# Patient Record
Sex: FEMALE | Race: WHITE | Employment: FULL TIME | RURAL
[De-identification: names, ages, dates, MRNs, and addresses within clinical notes are randomized per-mention and may not be internally consistent; named-entity substitution may affect disease eponyms.]

---

## 2017-01-10 RX ORDER — LIRAGLUTIDE 6 MG/ML
INJECTION SUBCUTANEOUS
Qty: 27 ML | Refills: 1 | Status: SHIPPED | OUTPATIENT
Start: 2017-01-10 | End: 2017-07-09 | Stop reason: SDUPTHER

## 2017-01-25 ENCOUNTER — OFFICE VISIT (OUTPATIENT)
Dept: FAMILY MEDICINE CLINIC | Age: 62
End: 2017-01-25

## 2017-01-25 VITALS
WEIGHT: 192.6 LBS | HEART RATE: 61 BPM | DIASTOLIC BLOOD PRESSURE: 58 MMHG | BODY MASS INDEX: 34.12 KG/M2 | SYSTOLIC BLOOD PRESSURE: 112 MMHG | HEIGHT: 63 IN | RESPIRATION RATE: 20 BRPM | OXYGEN SATURATION: 98 % | TEMPERATURE: 97.8 F

## 2017-01-25 DIAGNOSIS — E78.00 HYPERCHOLESTEROLEMIA: ICD-10-CM

## 2017-01-25 DIAGNOSIS — M19.90 ARTHRITIS: ICD-10-CM

## 2017-01-25 DIAGNOSIS — E03.4 HYPOTHYROIDISM DUE TO ACQUIRED ATROPHY OF THYROID: ICD-10-CM

## 2017-01-25 DIAGNOSIS — E11.9 TYPE 2 DIABETES MELLITUS WITHOUT COMPLICATION, WITHOUT LONG-TERM CURRENT USE OF INSULIN (HCC): ICD-10-CM

## 2017-01-25 DIAGNOSIS — Z00.00 ROUTINE ADULT HEALTH MAINTENANCE: Primary | ICD-10-CM

## 2017-01-25 DIAGNOSIS — E55.9 VITAMIN D DEFICIENCY: ICD-10-CM

## 2017-01-25 DIAGNOSIS — Z23 ENCOUNTER FOR IMMUNIZATION: ICD-10-CM

## 2017-01-25 RX ORDER — DICLOFENAC SODIUM 75 MG/1
75 TABLET, DELAYED RELEASE ORAL
Qty: 180 TAB | Refills: 0 | Status: SHIPPED | OUTPATIENT
Start: 2017-01-25 | End: 2017-05-28 | Stop reason: SDUPTHER

## 2017-01-25 NOTE — PROGRESS NOTES
Identified pt with two pt identifiers(name and ). Chief Complaint   Patient presents with    Diabetes    Hypertension    Coagulation disorder    Leg Pain     slipped this morning - pulled rt thigh and did yoga last night         Health Maintenance Due   Topic    BREAST CANCER SCRN MAMMOGRAM     HEMOGLOBIN A1C Q6M    We just got the mammogram in. On your desk     Wt Readings from Last 3 Encounters:   17 192 lb 9.6 oz (87.4 kg)   10/31/16 190 lb 3.2 oz (86.3 kg)   16 189 lb (85.7 kg)     Temp Readings from Last 3 Encounters:   17 97.8 °F (36.6 °C) (Oral)   10/31/16 97.7 °F (36.5 °C) (Oral)   16 97.7 °F (36.5 °C) (Oral)     BP Readings from Last 3 Encounters:   17 112/58   10/31/16 118/78   16 115/53     Pulse Readings from Last 3 Encounters:   17 61   10/31/16 61   16 61         Learning Assessment:  :     Learning Assessment 1/15/2014   PRIMARY LEARNER Patient   HIGHEST LEVEL OF EDUCATION - PRIMARY LEARNER  > 4 YEARS OF COLLEGE   BARRIERS PRIMARY LEARNER NONE   CO-LEARNER CAREGIVER No   PRIMARY LANGUAGE ENGLISH   LEARNER PREFERENCE PRIMARY DEMONSTRATION   ANSWERED BY self   RELATIONSHIP SELF       Depression Screening:  :     PHQ 2 / 9, over the last two weeks 2016   Little interest or pleasure in doing things Not at all   Feeling down, depressed or hopeless Not at all   Total Score PHQ 2 0       Fall Risk Assessment:  :     Fall Risk Assessment, last 12 mths 2016   Able to walk? Yes   Fall in past 12 months? No       Abuse Screening:  :     Abuse Screening Questionnaire 2016   Do you ever feel afraid of your partner? N N   Are you in a relationship with someone who physically or mentally threatens you? N N   Is it safe for you to go home?  Y Y       Coordination of Care Questionnaire:  :     1) Have you been to an emergency room, urgent care clinic since your last visit? no   Hospitalized since your last visit? no             2) Have you seen or consulted any other health care providers outside of [address] [place] [address] since your last visit? yes  mammogram (Include any pap smears or colon screenings in this section.)    3) Do you have an Advance Directive on file? yes  Are you interested in receiving information about Advance Directives? no    Reviewed record in preparation for visit and have obtained necessary documentation. Medication reconciliation up to date and corrected with patient at this time.

## 2017-01-25 NOTE — PROGRESS NOTES
Subjective: Mnoica Ro is a 62 y.o. female seen for follow up of diabetes. She also has hyperlipidemia. Diabetic Review of Systems - medication compliance: compliant most of the time, diabetic diet compliance: compliant most of the time, home glucose monitoring: is performed regularly. Other symptoms and concerns: none. Patient Active Problem List    Diagnosis Date Noted    Diabetes ([facility] [address] 12/10/2010    Hypercholesterolemia 12/10/2010    Hypothyroidism 12/10/2010    HX: breast cancer 12/10/2010    Environmental allergies 12/10/2010    Arthritis 12/10/2010    Sleep apnea 12/10/2010    Fatty liver 12/10/2010     Current Outpatient Prescriptions   Medication Sig Dispense Refill    diclofenac EC (VOLTAREN) 75 mg EC tablet Take 1 Tab by mouth two (2) times daily as needed. Indications: OSTEOARTHRITIS 180 Tab 0    VICTOZA 3-ELVIS 0.6 mg/0.1 mL (18 mg/3 mL) sub-q pen INJECT 1.8 MG UNDER THE SKIN ONCE A DAY 27 mL 1    GLUC TRINIDAD/CHONDRO TRINIDAD A/VIT C/MN (GLUCOSAMINE 1500 COMPLEX PO) Take  by mouth daily.  levothyroxine (SYNTHROID) 100 mcg tablet TAKE 1 TABLET DAILY BEFORE BREAKFAST (CHANGE IN DOSE OF MEDICATION) 90 Tab 3    lisinopril (PRINIVIL, ZESTRIL) 10 mg tablet Take 0.5 Tabs by mouth daily. TAKE 1 TABLET BY MOUTH EVERY DAY 90 Tab 3    colesevelam (WELCHOL) 625 mg tablet TAKE 3 TABLETS BY MOUTH TWICE A DAY WITH MEALS 540 Tab 3    metFORMIN ER (GLUCOPHAGE XR) 500 mg tablet TAKE 2 TABLETS BY MOUTH IN THE MORNING AND TAKE 2 TABLETS IN THE EVENING 360 Tab 3    venlafaxine-SR (EFFEXOR-XR) 75 mg capsule TAKE ONE CAPSULE BY MOUTH EVERY DAY 90 Cap 3    mometasone (NASONEX) 50 mcg/actuation nasal spray 2 Sprays daily.  ferrous sulfate 325 mg (65 mg iron) tablet Take  by mouth Daily (before breakfast).  fluticasone (FLONASE) 50 mcg/actuation nasal spray USE 2 SPRAYS TO BOTH NOSTRIL ONCE DAILY 16 g 5    milk thistle 500 mg cap Take 500 mg by mouth daily.  60 Cap 0    glucose blood VI test strips (ONE TOUCH ULTRA TEST) strip Use as directed 1 Package 11    cholecalciferol, vitamin d3, (VITAMIN D) 1,000 unit tablet Take 2,000 Units by mouth daily.  aspirin delayed-release 81 mg tablet Take  by mouth daily.  Omeprazole delayed release (PRILOSEC D/R) 20 mg tablet Take 1 Tab by mouth daily. 30 Tab 5    cetirizine (ZYRTEC) 10 mg tablet Take 10 mg by mouth daily.  MULTIVITAMIN PO Take  by mouth.  cyanocobalamin (VITAMIN B-12) 2,500 mcg Subl Take 2,500 mg by mouth daily.  CALCIUM CARBONATE/VITAMIN D3 (CALCIUM 600 + D,3, PO) Take  by mouth.  magnesium 250 mg Tab Take  by mouth.  OMEGA-3 FATTY ACIDS (OMEGA 3 PO) Take 4 Tabs by mouth daily.  carisoprodol (SOMA) 250 mg tablet TAKE 1 TABLET BY MOUTH 4 TIMES A DAY AS NEEDED FOR MUSCLE SPASM 90 Tab 1    traMADol (ULTRAM) 50 mg tablet Take 1 Tab by mouth every eight (8) hours as needed for Pain. Max Daily Amount: 150 mg. 180 Tab 1    clotrimazole-betamethasone (LOTRISONE) topical cream Apply to rash twice a day.  45 g 3    VENTOLIN HFA 90 mcg/actuation inhaler INHALE 2 PUFFS BY MOUTH EVERY 4 HOURS AS NEEDED FOR WHEEZING OR SHORTNESS OF BREATH 18 Inhaler 1     Allergies   Allergen Reactions    Crestor [Rosuvastatin] Other (comments)     Increased CK    Simvastatin Myalgia     Past Medical History   Diagnosis Date    Arthritis 12/10/2010    Breast cancer ([facility] [address]      removed 2/2/09    Diabetes ([facility] [address]     Fatty liver 12/10/2010    Hypercholesterolemia     Sleep apnea 12/10/2010    Thyroid disease      Past Surgical History   Procedure Laterality Date    Hx breast lumpectomy  2/2/09     reincision    Hx tonsil and adenoidectomy  1960    Hx gyn  2010     cervical polypectomy    Hx lithotripsy  1994    Endoscopy, colon, diagnostic  1/2009    Hx orthopaedic       foot    Hx heent      Hx cataract removal Right 11/2016    Hx cataract removal Left 11/2016     Family History   Problem Relation Age of Onset    Cancer Mother     COPD Mother     Cancer Father      mesothelioma    Arthritis-rheumatoid Sister      Social History   Substance Use Topics    Smoking status: Never Smoker    Smokeless tobacco: Never Used    Alcohol use No             Review of Systems  Pertinent items are noted in HPI. Objective:     Visit Vitals    /58 (BP 1 Location: Left arm, BP Patient Position: Sitting)    Pulse 61    Temp 97.8 °F (36.6 °C) (Oral)    Resp 20    Ht 5' 3\" (1.6 m)    Wt 192 lb 9.6 oz (87.4 kg)    SpO2 98%    BMI 34.12 kg/m2     Appearance: alert, well appearing, and in no distress and overweight. Exam: heart sounds normal rate, regular rhythm, normal S1, S2, no murmurs, rubs, clicks or gallops, chest clear  Lab review: orders written for new lab studies as appropriate; see orders. Assessment/Plan:     diabetes , hypertension well controlled, hyperlipidemia . Diabetic issues reviewed with her: annual eye examinations at Ophthalmology discussed. ICD-10-CM ICD-9-CM    1. Routine adult health maintenance Z00.00 V70.0 CBC W/O DIFF      METABOLIC PANEL, COMPREHENSIVE      LIPID PANEL      TSH 3RD GENERATION      HEMOGLOBIN A1C WITH EAG   2. Type 2 diabetes mellitus without complication, without long-term current use of insulin (HCC) E11.9 250.00    3. Hypercholesterolemia E78.00 272.0    4. Hypothyroidism due to acquired atrophy of thyroid E03.4 244.8      246.8    5. Vitamin D deficiency E55.9 268.9 VITAMIN D, 25 HYDROXY   6. Arthritis M19.90 716.90 diclofenac EC (VOLTAREN) 75 mg EC tablet   7. Encounter for immunization Z23 V03.89 pneumococcal 13 wilda conj dip (PREVNAR-13) 0.5 mL syrg injection     Patient Instructions   Pneumococcal 13-Valent Vaccine, Diphtheria Conjugate (By injection)   Pneumococcal 13-Valent Vaccine, Diphtheria Conjugate (HTB-wck-ZZX-al 13-VAY-lent VAX-een, dif-THEER-ee-a XJY-nxi-wgcz)  Prevents infections, such as pneumonia and meningitis.    Brand Name(s):Prevnar 13   There may be other brand names for this medicine. When This Medicine Should Not Be Used: This vaccine is not right for everyone. You should not receive this vaccine if you had an allergic reaction to pneumococcal or diphtheria vaccine. How to Use This Medicine:   Injectable  · A nurse or other health provider will give you this medicine. This vaccine is usually given as a shot into a muscle in the thigh or upper arm. · The vaccine schedule is different for different people. ¨ Tell your doctor if your child was born prematurely. Children who were premature may need to follow a different schedule. ¨ Children under 6: This vaccine is usually given as 3 or 4 separate shots over several months. Your child's doctor will tell you how many shots are needed and when to come back for the next one. ¨ Children over 6: This vaccine is given as a single shot. If your child recently received another pneumonia vaccine, this one should be given at least 8 weeks later. ¨ Adults over 50: This vaccine is given as a single dose. · It is very important for your child to receive all of the shots for the vaccine. · Missed dose: This vaccine must be given on a fixed schedule. If your child misses a dose, call your child's doctor for another appointment. Drugs and Foods to Avoid:   Ask your doctor or pharmacist before using any other medicine, including over-the-counter medicines, vitamins, and herbal products. · Some medicines can affect how this vaccine works. Tell your doctor if you are receiving a treatment or medicine that causes a weak immune system. This includes radiation treatment, steroid medicine (such as hydrocortisone, methylprednisolone, prednisolone, prednisone), or cancer medicine. Warnings While Using This Medicine:   · Adults and adolescents: Tell your doctor if you are pregnant or breastfeeding. · Tell your doctor if you have a weak immune system. You may not be fully protected by this vaccine.   Possible Side Effects While Using This Medicine:   Call your doctor right away if you notice any of these side effects:  · Allergic reaction: Itching or hives, swelling in your face or hands, swelling or tingling in your mouth or throat, chest tightness, trouble breathing  · High fever  If you notice these less serious side effects, talk with your doctor:   · Crying, irritability, or fussiness  · Joint or muscle pain  · Mild skin rash  · Pain, burning, redness, or swelling where the shot was given  · Poor appetite  · Sleep changes  If you notice other side effects that you think are caused by this medicine, tell your doctor. Call your doctor for medical advice about side effects. You may report side effects to FDA at 7-463-FDA-1497  © 2016 [address] is for End User's use only and may not be sold, redistributed or otherwise used for commercial purposes. The above information is an  only. It is not intended as medical advice for individual conditions or treatments. Talk to your doctor, nurse or pharmacist before following any medical regimen to see if it is safe and effective for you.

## 2017-01-25 NOTE — MR AVS SNAPSHOT
Visit Information Date & Time Provider Department Dept. Phone Encounter #  
 [id number]  1:07 AM Brooke MarinAlen [phone] [id number] Upcoming Health Maintenance Date Due MICROALBUMIN Q1 4/7/2017 FOOT EXAM Q1 7/20/2017 LIPID PANEL Q1 7/20/2017 HEMOGLOBIN A1C Q6M 7/25/2017 EYE EXAM RETINAL OR DILATED Q1 9/21/2017 DTaP/Tdap/Td series (2 - Td) 11/12/2018 BREAST CANCER SCRN MAMMOGRAM 1/11/2019 PAP AKA CERVICAL CYTOLOGY 3/24/2019 COLONOSCOPY 10/23/2025 Allergies as of 1/25/2017  Review Complete On: 2/69/7606 By: Brooke Marin MD  
  
 Severity Noted Reaction Type Reactions Crestor [Rosuvastatin]  12/12/2011   Side Effect Other (comments) Increased CK Simvastatin  12/12/2011    Myalgia Current Immunizations  Reviewed on 10/31/2016 Name Date Influenza Vaccine 10/25/2016, 10/21/2014, 10/8/2012 Influenza Vaccine Split 10/10/2011 Pneumococcal Vaccine (Unspecified Type) 10/10/2011 TDAP Vaccine 11/12/2008 Zoster Vaccine, Live 4/22/2015 Not reviewed this visit You Were Diagnosed With   
  
 Codes Comments Routine adult health maintenance    -  Primary ICD-10-CM: Z00.00 ICD-9-CM: V70.0 Type 2 diabetes mellitus without complication, without long-term current use of insulin (HCC)     ICD-10-CM: E11.9 ICD-9-CM: 250.00 Hypercholesterolemia     ICD-10-CM: E78.00 ICD-9-CM: 272.0 Hypothyroidism due to acquired atrophy of thyroid     ICD-10-CM: E03.4 ICD-9-CM: 244.8, 246.8 Vitamin D deficiency     ICD-10-CM: E55.9 ICD-9-CM: 268.9 Arthritis     ICD-10-CM: M19.90 ICD-9-CM: 716.90 Encounter for immunization     ICD-10-CM: K84 ICD-9-CM: V03.89 Vitals BP Pulse Temp Resp Height(growth percentile) Weight(growth percentile) 112/58 (BP 1 Location: Left arm, BP Patient Position: Sitting) 61 97.8 °F (36.6 °C) (Oral) 20 5' 3\" (1.6 m) 192 lb 9.6 oz (87.4 kg) SpO2 BMI OB Status Smoking Status 98% 34.12 kg/m2 Postmenopausal Never Smoker BMI and BSA Data Body Mass Index Body Surface Area  
 34.12 kg/m 2 1.97 m 2 Preferred Pharmacy Pharmacy Name Phone [facility] [phone] Your Updated Medication List  
  
   
This list is accurate as of: 1/25/17 10:38 AM.  Always use your most recent med list.  
  
  
  
  
 aspirin delayed-release 81 mg tablet Take  by mouth daily. CALCIUM 600 + D(3) PO Take  by mouth.  
  
 carisoprodol 250 mg tablet Commonly known as:  SOMA TAKE 1 TABLET BY MOUTH 4 TIMES A DAY AS NEEDED FOR MUSCLE SPASM  
  
 clotrimazole-betamethasone topical cream  
Commonly known as:  Kaleta Euler Apply to rash twice a day. colesevelam 625 mg tablet Commonly known as:  WELCHOL  
TAKE 3 TABLETS BY MOUTH TWICE A DAY WITH MEALS  
  
 diclofenac EC 75 mg EC tablet Commonly known as:  VOLTAREN Take 1 Tab by mouth two (2) times daily as needed. Indications: OSTEOARTHRITIS  
  
 ferrous sulfate 325 mg (65 mg iron) tablet Take  by mouth Daily (before breakfast). fluticasone 50 mcg/actuation nasal spray Commonly known as:  FLONASE  
USE 2 SPRAYS TO BOTH NOSTRIL ONCE DAILY  
  
 GLUCOSAMINE 1500 COMPLEX PO Take  by mouth daily. glucose blood VI test strips strip Commonly known as:  ONETOUCH ULTRA TEST Use as directed  
  
 levothyroxine 100 mcg tablet Commonly known as:  SYNTHROID  
TAKE 1 TABLET DAILY BEFORE BREAKFAST (CHANGE IN DOSE OF MEDICATION)  
  
 lisinopril 10 mg tablet Commonly known as:  Jina Christina Take 0.5 Tabs by mouth daily. TAKE 1 TABLET BY MOUTH EVERY DAY  
  
 magnesium 250 mg Tab Take  by mouth.  
  
 metFORMIN  mg tablet Commonly known as:  GLUCOPHAGE XR  
TAKE 2 TABLETS BY MOUTH IN THE MORNING AND TAKE 2 TABLETS IN THE EVENING  
  
 milk thistle 500 mg Cap Take 500 mg by mouth daily. MULTIVITAMIN PO Take  by mouth. NASONEX 50 mcg/actuation nasal spray Generic drug:  mometasone 2 Sprays daily. OMEGA 3 PO Take 4 Tabs by mouth daily. Omeprazole delayed release 20 mg tablet Commonly known as:  PRILOSEC D/R Take 1 Tab by mouth daily. pneumococcal 13 wilda conj dip 0.5 mL Syrg injection Commonly known as:  PREVNAR-13  
0.5 mL by IntraMUSCular route once for 1 dose. traMADol 50 mg tablet Commonly known as:  ULTRAM  
Take 1 Tab by mouth every eight (8) hours as needed for Pain. Max Daily Amount: 150 mg.  
  
 venlafaxine-SR 75 mg capsule Commonly known as:  EFFEXOR-XR  
TAKE ONE CAPSULE BY MOUTH EVERY DAY  
  
 VENTOLIN HFA 90 mcg/actuation inhaler Generic drug:  albuterol INHALE 2 PUFFS BY MOUTH EVERY 4 HOURS AS NEEDED FOR WHEEZING OR SHORTNESS OF BREATH  
  
 VICTOZA 3-ELVIS 0.6 mg/0.1 mL (18 mg/3 mL) sub-q pen Generic drug:  Liraglutide INJECT 1.8 MG UNDER THE SKIN ONCE A DAY  
  
 VITAMIN B-12 2,500 mcg sublingual tablet Generic drug:  cyanocobalamin Take 2,500 mg by mouth daily. VITAMIN D3 1,000 unit tablet Generic drug:  cholecalciferol Take 2,000 Units by mouth daily. ZyrTEC 10 mg tablet Generic drug:  cetirizine Take 10 mg by mouth daily. Prescriptions Printed Refills  
 pneumococcal 13 wilda conj dip (PREVNAR-13) 0.5 mL syrg injection 0 Si.5 mL by IntraMUSCular route once for 1 dose. Class: Print Route: IntraMUSCular Prescriptions Sent to Pharmacy Refills  
 diclofenac EC (VOLTAREN) 75 mg EC tablet 0 Sig: Take 1 Tab by mouth two (2) times daily as needed. Indications: OSTEOARTHRITIS Class: Normal  
 Pharmacy: [address], [address] [place] [address] Ph #: [phone] Route: Oral  
  
We Performed the Following CBC W/O DIFF [06708 CPT(R)] HEMOGLOBIN A1C WITH EAG [70496 CPT(R)] LIPID PANEL [39908 CPT(R)] METABOLIC PANEL, COMPREHENSIVE [26162 CPT(R)] TSH 3RD GENERATION [22330 CPT(R)] VITAMIN D, 25 HYDROXY [id number] CPT(R)] Patient Instructions Pneumococcal 13-Valent Vaccine, Diphtheria Conjugate (By injection) Pneumococcal 13-Valent Vaccine, Diphtheria Conjugate (NIH-mui-GMS-al 13-VAY-lent VAX-een, dif-THEER-ee-a URF-bej-oqck) Prevents infections, such as pneumonia and meningitis. Brand Name(s):Prevnar 13 There may be other brand names for this medicine. When This Medicine Should Not Be Used: This vaccine is not right for everyone. You should not receive this vaccine if you had an allergic reaction to pneumococcal or diphtheria vaccine. How to Use This Medicine:  
Injectable · A nurse or other health provider will give you this medicine. This vaccine is usually given as a shot into a muscle in the thigh or upper arm. · The vaccine schedule is different for different people. ¨ Tell your doctor if your child was born prematurely. Children who were premature may need to follow a different schedule. ¨ Children under 6: This vaccine is usually given as 3 or 4 separate shots over several months. Your child's doctor will tell you how many shots are needed and when to come back for the next one. ¨ Children over 6: This vaccine is given as a single shot. If your child recently received another pneumonia vaccine, this one should be given at least 8 weeks later. ¨ Adults over 50: This vaccine is given as a single dose. · It is very important for your child to receive all of the shots for the vaccine. · Missed dose: This vaccine must be given on a fixed schedule. If your child misses a dose, call your child's doctor for another appointment. Drugs and Foods to Avoid: Ask your doctor or pharmacist before using any other medicine, including over-the-counter medicines, vitamins, and herbal products. · Some medicines can affect how this vaccine works. Tell your doctor if you are receiving a treatment or medicine that causes a weak immune system. This includes radiation treatment, steroid medicine (such as hydrocortisone, methylprednisolone, prednisolone, prednisone), or cancer medicine. Warnings While Using This Medicine: · Adults and adolescents: Tell your doctor if you are pregnant or breastfeeding. · Tell your doctor if you have a weak immune system. You may not be fully protected by this vaccine. Possible Side Effects While Using This Medicine:  
Call your doctor right away if you notice any of these side effects: · Allergic reaction: Itching or hives, swelling in your face or hands, swelling or tingling in your mouth or throat, chest tightness, trouble breathing · High fever If you notice these less serious side effects, talk with your doctor: · Crying, irritability, or fussiness · Joint or muscle pain · Mild skin rash · Pain, burning, redness, or swelling where the shot was given · Poor appetite · Sleep changes If you notice other side effects that you think are caused by this medicine, tell your doctor. Call your doctor for medical advice about side effects. You may report side effects to FDA at 6-028-FDA-3128 © 2016 [address] is for End User's use only and may not be sold, redistributed or otherwise used for commercial purposes. The above information is an  only. It is not intended as medical advice for individual conditions or treatments. Talk to your doctor, nurse or pharmacist before following any medical regimen to see if it is safe and effective for you. Introducing [facility]! Dear Isauro Stevens: Thank you for requesting a Likeability account. Our records indicate that you already have an active Likeability account. You can access your account anytime at [email or web address] Did you know that you can access your hospital and ER discharge instructions at any time in Exerscrip? You can also review all of your test results from your hospital stay or ER visit. Additional Information If you have questions, please visit the Frequently Asked Questions section of the Exersc[employer] website at [email or web address] Remember, Exerscrip is NOT to be used for urgent needs. For medical emergencies, dial 911. Now available from your iPhone and Android! Please provide this summary of care documentation to your next provider. Your primary care clinician is listed as Chacho Cruz. If you have any questions after today's visit, please call [phone].

## 2017-01-25 NOTE — PATIENT INSTRUCTIONS
Pneumococcal 13-Valent Vaccine, Diphtheria Conjugate (By injection)   Pneumococcal 13-Valent Vaccine, Diphtheria Conjugate (HKB-mbp-NPO-al 13-VAY-lent VAX-een, dif-THEER-ee-a CCO-lgn-mfwm)  Prevents infections, such as pneumonia and meningitis. Brand Name(s):Prevnar 13   There may be other brand names for this medicine. When This Medicine Should Not Be Used: This vaccine is not right for everyone. You should not receive this vaccine if you had an allergic reaction to pneumococcal or diphtheria vaccine. How to Use This Medicine:   Injectable  · A nurse or other health provider will give you this medicine. This vaccine is usually given as a shot into a muscle in the thigh or upper arm. · The vaccine schedule is different for different people. ¨ Tell your doctor if your child was born prematurely. Children who were premature may need to follow a different schedule. ¨ Children under 6: This vaccine is usually given as 3 or 4 separate shots over several months. Your child's doctor will tell you how many shots are needed and when to come back for the next one. ¨ Children over 6: This vaccine is given as a single shot. If your child recently received another pneumonia vaccine, this one should be given at least 8 weeks later. ¨ Adults over 50: This vaccine is given as a single dose. · It is very important for your child to receive all of the shots for the vaccine. · Missed dose: This vaccine must be given on a fixed schedule. If your child misses a dose, call your child's doctor for another appointment. Drugs and Foods to Avoid:   Ask your doctor or pharmacist before using any other medicine, including over-the-counter medicines, vitamins, and herbal products. · Some medicines can affect how this vaccine works. Tell your doctor if you are receiving a treatment or medicine that causes a weak immune system.  This includes radiation treatment, steroid medicine (such as hydrocortisone, methylprednisolone, prednisolone, prednisone), or cancer medicine. Warnings While Using This Medicine:   · Adults and adolescents: Tell your doctor if you are pregnant or breastfeeding. · Tell your doctor if you have a weak immune system. You may not be fully protected by this vaccine. Possible Side Effects While Using This Medicine:   Call your doctor right away if you notice any of these side effects:  · Allergic reaction: Itching or hives, swelling in your face or hands, swelling or tingling in your mouth or throat, chest tightness, trouble breathing  · High fever  If you notice these less serious side effects, talk with your doctor:   · Crying, irritability, or fussiness  · Joint or muscle pain  · Mild skin rash  · Pain, burning, redness, or swelling where the shot was given  · Poor appetite  · Sleep changes  If you notice other side effects that you think are caused by this medicine, tell your doctor. Call your doctor for medical advice about side effects. You may report side effects to FDA at 5-996-FDA-1092  © 2016 [address] is for End User's use only and may not be sold, redistributed or otherwise used for commercial purposes. The above information is an  only. It is not intended as medical advice for individual conditions or treatments. Talk to your doctor, nurse or pharmacist before following any medical regimen to see if it is safe and effective for you.

## 2017-01-27 LAB
25(OH)D3+25(OH)D2 SERPL-MCNC: 29.5 NG/ML (ref 30–100)
ALBUMIN SERPL-MCNC: 4.2 G/DL (ref 3.6–4.8)
ALBUMIN/GLOB SERPL: 1.8 {RATIO} (ref 1.1–2.5)
ALP SERPL-CCNC: 64 IU/L (ref 39–117)
ALT SERPL-CCNC: 45 IU/L (ref 0–32)
AST SERPL-CCNC: 31 IU/L (ref 0–40)
BILIRUB SERPL-MCNC: 0.2 MG/DL (ref 0–1.2)
BUN SERPL-MCNC: 11 MG/DL (ref 8–27)
BUN/CREAT SERPL: 16 (ref 11–26)
CALCIUM SERPL-MCNC: 9.6 MG/DL (ref 8.7–10.3)
CHLORIDE SERPL-SCNC: 101 MMOL/L (ref 96–106)
CHOLEST SERPL-MCNC: 242 MG/DL (ref 100–199)
CO2 SERPL-SCNC: 22 MMOL/L (ref 18–29)
CREAT SERPL-MCNC: 0.7 MG/DL (ref 0.57–1)
ERYTHROCYTE [DISTWIDTH] IN BLOOD BY AUTOMATED COUNT: 13.9 % (ref 12.3–15.4)
EST. AVERAGE GLUCOSE BLD GHB EST-MCNC: 123 MG/DL
GLOBULIN SER CALC-MCNC: 2.4 G/DL (ref 1.5–4.5)
GLUCOSE SERPL-MCNC: 77 MG/DL (ref 65–99)
HBA1C MFR BLD: 5.9 % (ref 4.8–5.6)
HCT VFR BLD AUTO: 40 % (ref 34–46.6)
HDLC SERPL-MCNC: 57 MG/DL
HGB BLD-MCNC: 13.1 G/DL (ref 11.1–15.9)
INTERPRETATION, 910389: NORMAL
LDLC SERPL CALC-MCNC: 133 MG/DL (ref 0–99)
MCH RBC QN AUTO: 29.4 PG (ref 26.6–33)
MCHC RBC AUTO-ENTMCNC: 32.8 G/DL (ref 31.5–35.7)
MCV RBC AUTO: 90 FL (ref 79–97)
PLATELET # BLD AUTO: 251 X10E3/UL (ref 150–379)
POTASSIUM SERPL-SCNC: 4.5 MMOL/L (ref 3.5–5.2)
PROT SERPL-MCNC: 6.6 G/DL (ref 6–8.5)
RBC # BLD AUTO: 4.46 X10E6/UL (ref 3.77–5.28)
SODIUM SERPL-SCNC: 140 MMOL/L (ref 134–144)
TRIGL SERPL-MCNC: 260 MG/DL (ref 0–149)
TSH SERPL DL<=0.005 MIU/L-ACNC: 7.38 UIU/ML (ref 0.45–4.5)
VLDLC SERPL CALC-MCNC: 52 MG/DL (ref 5–40)
WBC # BLD AUTO: 5 X10E3/UL (ref 3.4–10.8)

## 2017-01-31 ENCOUNTER — PATIENT MESSAGE (OUTPATIENT)
Dept: FAMILY MEDICINE CLINIC | Age: 62
End: 2017-01-31

## 2017-01-31 RX ORDER — VENLAFAXINE HYDROCHLORIDE 75 MG/1
CAPSULE, EXTENDED RELEASE ORAL
Qty: 90 CAP | Refills: 3 | Status: SHIPPED | OUTPATIENT
Start: 2017-01-31 | End: 2018-01-26 | Stop reason: SDUPTHER

## 2017-01-31 RX ORDER — METFORMIN HYDROCHLORIDE 500 MG/1
TABLET, EXTENDED RELEASE ORAL
Qty: 360 TAB | Refills: 3 | Status: SHIPPED | OUTPATIENT
Start: 2017-01-31 | End: 2018-01-26 | Stop reason: SDUPTHER

## 2017-01-31 RX ORDER — COLESEVELAM HYDROCHLORIDE 625 MG/1
TABLET, FILM COATED ORAL
Qty: 540 TAB | Refills: 3 | Status: SHIPPED | OUTPATIENT
Start: 2017-01-31 | End: 2018-01-26 | Stop reason: SDUPTHER

## 2017-01-31 RX ORDER — LISINOPRIL 10 MG/1
5 TABLET ORAL DAILY
Qty: 90 TAB | Refills: 3 | Status: SHIPPED | OUTPATIENT
Start: 2017-01-31 | End: 2018-08-16 | Stop reason: SDUPTHER

## 2017-02-02 ENCOUNTER — TELEPHONE (OUTPATIENT)
Dept: FAMILY MEDICINE CLINIC | Age: 62
End: 2017-02-02

## 2017-02-02 RX ORDER — LEVOTHYROXINE SODIUM 112 UG/1
112 TABLET ORAL
Qty: 90 TAB | Refills: 1 | Status: SHIPPED | OUTPATIENT
Start: 2017-02-02 | End: 2017-05-30 | Stop reason: DRUGHIGH

## 2017-02-02 RX ORDER — GLUCOSAMINE SULFATE 1500 MG
3000 POWDER IN PACKET (EA) ORAL DAILY
Qty: 90 CAP | Refills: 12 | COMMUNITY
Start: 2017-02-02 | End: 2017-08-16 | Stop reason: DRUGHIGH

## 2017-02-03 NOTE — TELEPHONE ENCOUNTER
----- Message from Dallas Ordaz LPN sent at 6/3/6673  4:00 PM EST -----  Regarding: FW: Non-Urgent Medical Question  Contact: [phone]      ----- Message -----     From: Erasmo Jones     Sent: 2/2/2017   2:14 PM       To: [facility] Nurse Pool  Subject: Non-Urgent Medical Question                      Dr. Glendy Drake, what do you want me to do about the results of my thyroid test and Vitamin D?   Thanks, Tamiko Arita

## 2017-02-05 ENCOUNTER — TELEPHONE (OUTPATIENT)
Dept: FAMILY MEDICINE CLINIC | Age: 62
End: 2017-02-05

## 2017-02-05 RX ORDER — EZETIMIBE 10 MG/1
10 TABLET ORAL DAILY
Qty: 90 TAB | Refills: 1 | Status: SHIPPED | OUTPATIENT
Start: 2017-02-05 | End: 2017-03-06 | Stop reason: SDUPTHER

## 2017-02-05 NOTE — PROGRESS NOTES
Labs show increase in cholesterol numbers compared to 6 months ago. Welchol alone is not working enough. I want to ADD ON Zetia 10 mg one daily. This is NOT a statin but will work with the [facility] to lower cholesterol. I will send order for Zetia to [facility]. Plan to repeat fasting lipids in 3 months. I also sent increased dose of thyroid medicine.

## 2017-02-06 DIAGNOSIS — E78.00 HYPERCHOLESTEROLEMIA: ICD-10-CM

## 2017-02-06 RX ORDER — ROSUVASTATIN CALCIUM 5 MG/1
5 TABLET, COATED ORAL
Qty: 30 TAB | Refills: 3 | Status: CANCELLED | OUTPATIENT
Start: 2017-02-06

## 2017-02-06 NOTE — TELEPHONE ENCOUNTER
Request faxed from from [facility] of atorvastatin and rosuvastatin tabs. I only was able to see Rosuvastatin.

## 2017-02-10 ENCOUNTER — TELEPHONE (OUTPATIENT)
Dept: FAMILY MEDICINE CLINIC | Age: 62
End: 2017-02-10

## 2017-02-10 NOTE — TELEPHONE ENCOUNTER
Pharmacy is calling and stated that insurance will not cover zetia until others are tried first.  They recommend torvastatin.   Please call at [phone] to advise and give reference #[id number]

## 2017-02-12 ENCOUNTER — PATIENT MESSAGE (OUTPATIENT)
Dept: FAMILY MEDICINE CLINIC | Age: 62
End: 2017-02-12

## 2017-02-14 ENCOUNTER — PATIENT MESSAGE (OUTPATIENT)
Dept: FAMILY MEDICINE CLINIC | Age: 62
End: 2017-02-14

## 2017-02-15 ENCOUNTER — TELEPHONE (OUTPATIENT)
Dept: FAMILY MEDICINE CLINIC | Age: 62
End: 2017-02-15

## 2017-02-15 NOTE — TELEPHONE ENCOUNTER
Forward from call center;    Pt states that [facility] needs to know the gauge and length of her Victoza needles

## 2017-02-20 ENCOUNTER — TELEPHONE (OUTPATIENT)
Dept: FAMILY MEDICINE CLINIC | Age: 62
End: 2017-02-20

## 2017-02-20 RX ORDER — HYDROCORTISONE ACETATE 25 MG/1
25 SUPPOSITORY RECTAL
Qty: 12 SUPPOSITORY | Refills: 1 | Status: SHIPPED | OUTPATIENT
Start: 2017-02-20 | End: 2018-09-19 | Stop reason: ALTCHOICE

## 2017-02-20 NOTE — TELEPHONE ENCOUNTER
----- Message from Laura Cruz LPN sent at 2/11/9328  2:42 PM EST -----  Regarding: FW: Prescription Question  Contact: [phone]      ----- Message -----     From: Tim Fernandez     Sent: 2/20/2017   2:22 PM       To: [facility] Nurse Pool  Subject: Prescription Question                            I am having a problem with my hemmoroids is there a prescription that can be called in? Preparation H doesn't seem to be doing it. I am having pain with bowl movements and some fresh blood.  Thank you

## 2017-02-22 NOTE — TELEPHONE ENCOUNTER
From: Taylor Duncan  To: Gracie Calzada MD  Sent: 2/22/2017 8:19 AM EST  Subject: Medication Renewal Request    Original authorizing provider: MD Taylor Sheldon would like a refill of the following medications:  VENTOLIN HFA 90 mcg/actuation inhaler Gracie Calzada MD]    Preferred pharmacy: [facility] #[id number] - [address]  AT [address] [facility] [address]    Comment:

## 2017-02-23 RX ORDER — ALBUTEROL SULFATE 90 UG/1
2 AEROSOL, METERED RESPIRATORY (INHALATION)
Qty: 18 INHALER | Refills: 1 | Status: SHIPPED | OUTPATIENT
Start: 2017-02-23 | End: 2019-02-13 | Stop reason: SDUPTHER

## 2017-03-06 ENCOUNTER — TELEPHONE (OUTPATIENT)
Dept: FAMILY MEDICINE CLINIC | Age: 62
End: 2017-03-06

## 2017-03-06 RX ORDER — EZETIMIBE 10 MG/1
10 TABLET ORAL DAILY
Qty: 90 TAB | Refills: 1 | Status: SHIPPED | OUTPATIENT
Start: 2017-03-06 | End: 2017-08-16 | Stop reason: SDUPTHER

## 2017-03-06 NOTE — TELEPHONE ENCOUNTER
----- Message from Amor Barros LPN sent at 9/5/1353 12:10 PM EST -----  Regarding: FW: Prescription Question  Contact: [phone]      ----- Message -----     From: Searcy Angelucci     Sent: 3/6/2017  11:59 AM       To: [facility] Nurse Pool  Subject: Prescription Question                            I still have not received the Ezetimble Tabs they are waiting for the form I sent you to be filled out for a new prescription.

## 2017-03-09 NOTE — TELEPHONE ENCOUNTER
Asked about receiving forms. I was told that [facility] has special requirements and it is a different form. She will fax it to me.

## 2017-04-28 ENCOUNTER — TELEPHONE (OUTPATIENT)
Dept: FAMILY MEDICINE CLINIC | Age: 62
End: 2017-04-28

## 2017-04-28 DIAGNOSIS — E78.00 HYPERCHOLESTEROLEMIA: Primary | ICD-10-CM

## 2017-04-28 NOTE — LETTER
4/28/2017 11:39 AM 
 
Ms. Floyd Del Toor [address] [address] [address] [phone] Dear Nusrat James, Please find enclosed lab orders. Sincerely, Junior Patel MD

## 2017-04-28 NOTE — TELEPHONE ENCOUNTER
----- Message from Ynes Rinaldi LPN sent at 7/62/7003 10:19 AM EDT -----  Regarding: FW: Non-Urgent Medical Question  Contact: [phone]      ----- Message -----     From: Constantino Daniels     Sent: 4/28/2017   9:18 AM       To: [facility] Nurse Pool  Subject: Non-Urgent Medical Question                      Dr. Pedro Magana what do I need to go to [facility] and get my blood work done? Isn't it time for me to get tested agian?  Thank  you

## 2017-05-09 ENCOUNTER — TELEPHONE (OUTPATIENT)
Dept: FAMILY MEDICINE CLINIC | Age: 62
End: 2017-05-09

## 2017-05-09 DIAGNOSIS — E03.9 ACQUIRED HYPOTHYROIDISM: ICD-10-CM

## 2017-05-09 DIAGNOSIS — E78.00 HYPERCHOLESTEROLEMIA: Primary | ICD-10-CM

## 2017-05-09 NOTE — LETTER
5/10/2017 1:57 PM 
 
Ms. Jory Crabtree [address] [place], [place] [address] [address] Dear Rosalio [place], Please see enclosed lab orders. Sincerely, Carlos Harvey MD

## 2017-05-10 NOTE — TELEPHONE ENCOUNTER
----- Message from Hayden De Dios LPN sent at 7/9/2203 11:56 AM EDT -----  Regarding: FW: Non-Urgent Medical Question  Contact: [phone]      ----- Message -----     From: Rocky Ryan     Sent: 5/9/2017  11:56 AM       To: [facility] Nurse Pool  Subject: Non-Urgent Medical Question                      I never received the prescription for the lab work, was it sent directly to [facility]? If so which one?  Thanks

## 2017-05-10 NOTE — TELEPHONE ENCOUNTER
Kanchan,  I re-ordered lipids and thyroid test.  Please print and mail to pt. She never received the one from April.

## 2017-05-27 LAB
CHOLEST SERPL-MCNC: 179 MG/DL (ref 100–199)
HDLC SERPL-MCNC: 53 MG/DL
INTERPRETATION, 910389: NORMAL
LDLC SERPL CALC-MCNC: 74 MG/DL (ref 0–99)
T4 FREE SERPL-MCNC: 1.03 NG/DL (ref 0.82–1.77)
TRIGL SERPL-MCNC: 258 MG/DL (ref 0–149)
TSH SERPL DL<=0.005 MIU/L-ACNC: 5.46 UIU/ML (ref 0.45–4.5)
VLDLC SERPL CALC-MCNC: 52 MG/DL (ref 5–40)

## 2017-05-28 DIAGNOSIS — M19.90 ARTHRITIS: ICD-10-CM

## 2017-05-30 ENCOUNTER — TELEPHONE (OUTPATIENT)
Dept: FAMILY MEDICINE CLINIC | Age: 62
End: 2017-05-30

## 2017-05-30 DIAGNOSIS — E03.9 ACQUIRED HYPOTHYROIDISM: Primary | ICD-10-CM

## 2017-05-30 RX ORDER — DICLOFENAC SODIUM 75 MG/1
TABLET, DELAYED RELEASE ORAL
Qty: 180 TAB | Refills: 1 | Status: SHIPPED | OUTPATIENT
Start: 2017-05-30 | End: 2022-04-12 | Stop reason: SDUPTHER

## 2017-05-30 RX ORDER — LEVOTHYROXINE SODIUM 125 UG/1
125 TABLET ORAL
Qty: 90 TAB | Refills: 1 | Status: SHIPPED | OUTPATIENT
Start: 2017-05-30 | End: 2017-08-17 | Stop reason: DRUGHIGH

## 2017-05-31 NOTE — TELEPHONE ENCOUNTER
Good improvement in cholesterol numbers!! But need to increase dose of levothyroxine further. I will send new prescription. Plan to recheck lab in 3 months.

## 2017-07-10 RX ORDER — LIRAGLUTIDE 6 MG/ML
INJECTION SUBCUTANEOUS
Qty: 27 ML | Refills: 0 | Status: SHIPPED | OUTPATIENT
Start: 2017-07-10 | End: 2017-08-16 | Stop reason: SDUPTHER

## 2017-07-26 ENCOUNTER — TELEPHONE (OUTPATIENT)
Dept: FAMILY MEDICINE CLINIC | Age: 62
End: 2017-07-26

## 2017-07-26 RX ORDER — CARISOPRODOL 250 MG/1
TABLET ORAL
Qty: 90 TAB | Refills: 1 | Status: SHIPPED | OUTPATIENT
Start: 2017-07-26 | End: 2019-02-13 | Stop reason: SDUPTHER

## 2017-07-26 NOTE — TELEPHONE ENCOUNTER
----- Message from Coletta Bumpers, LPN sent at 3/78/2781  2:41 PM EDT -----  Regarding: FW: Prescription Question  Contact: [phone]      ----- Message -----     From: Floyd Dillard     Sent: 7/26/2017   2:26 PM       To: [facility] Nurse Pool  Subject: Prescription Question                            Hi, can you please send in a refill for Carisoprodol Tabs 250?

## 2017-08-16 ENCOUNTER — OFFICE VISIT (OUTPATIENT)
Dept: FAMILY MEDICINE CLINIC | Age: 62
End: 2017-08-16

## 2017-08-16 VITALS
DIASTOLIC BLOOD PRESSURE: 66 MMHG | OXYGEN SATURATION: 97 % | HEIGHT: 63 IN | HEART RATE: 77 BPM | BODY MASS INDEX: 34.27 KG/M2 | TEMPERATURE: 97.7 F | SYSTOLIC BLOOD PRESSURE: 110 MMHG | WEIGHT: 193.4 LBS | RESPIRATION RATE: 20 BRPM

## 2017-08-16 DIAGNOSIS — E03.4 HYPOTHYROIDISM DUE TO ACQUIRED ATROPHY OF THYROID: ICD-10-CM

## 2017-08-16 DIAGNOSIS — E55.9 VITAMIN D DEFICIENCY: ICD-10-CM

## 2017-08-16 DIAGNOSIS — J32.9 SINUSITIS, UNSPECIFIED CHRONICITY, UNSPECIFIED LOCATION: ICD-10-CM

## 2017-08-16 DIAGNOSIS — E11.9 TYPE 2 DIABETES MELLITUS WITHOUT COMPLICATION, WITHOUT LONG-TERM CURRENT USE OF INSULIN (HCC): Primary | ICD-10-CM

## 2017-08-16 DIAGNOSIS — E78.00 HYPERCHOLESTEROLEMIA: ICD-10-CM

## 2017-08-16 DIAGNOSIS — Z79.899 ENCOUNTER FOR LONG-TERM CURRENT USE OF MEDICATION: ICD-10-CM

## 2017-08-16 LAB
ALBUMIN UR QL STRIP: 10 MG/L
BILIRUB UR QL STRIP: NEGATIVE
CREATININE, URINE POC: 200 MG/DL
GLUCOSE UR-MCNC: NEGATIVE MG/DL
KETONES P FAST UR STRIP-MCNC: NEGATIVE MG/DL
MICROALBUMIN/CREAT RATIO POC: <30 MG/G
PH UR STRIP: 5.5 [PH] (ref 4.6–8)
PROT UR QL STRIP: NEGATIVE MG/DL
SP GR UR STRIP: 1.01 (ref 1–1.03)
UA UROBILINOGEN AMB POC: NORMAL (ref 0.2–1)
URINALYSIS CLARITY POC: CLEAR
URINALYSIS COLOR POC: YELLOW
URINE BLOOD POC: NEGATIVE
URINE LEUKOCYTES POC: NEGATIVE
URINE NITRITES POC: NEGATIVE

## 2017-08-16 RX ORDER — AMOXICILLIN AND CLAVULANATE POTASSIUM 875; 125 MG/1; MG/1
1 TABLET, FILM COATED ORAL 2 TIMES DAILY
Qty: 20 TAB | Refills: 0 | Status: SHIPPED | OUTPATIENT
Start: 2017-08-16 | End: 2017-08-26

## 2017-08-16 RX ORDER — EZETIMIBE 10 MG/1
10 TABLET ORAL DAILY
Qty: 90 TAB | Refills: 1 | Status: SHIPPED | OUTPATIENT
Start: 2017-08-16 | End: 2018-03-12 | Stop reason: SDUPTHER

## 2017-08-16 NOTE — PROGRESS NOTES
Identified pt with two pt identifiers(name and ). Chief Complaint   Patient presents with    Diabetes    Sinus Pain     started this weekend     Cyst     darcy size knot on right upper arm - noticed a couple months ago and it has not changed         Health Maintenance Due   Topic    MICROALBUMIN Q1     FOOT EXAM Q1     HEMOGLOBIN A1C Q6M        Wt Readings from Last 3 Encounters:   17 193 lb 6.4 oz (87.7 kg)   17 192 lb 9.6 oz (87.4 kg)   10/31/16 190 lb 3.2 oz (86.3 kg)     Temp Readings from Last 3 Encounters:   17 97.7 °F (36.5 °C) (Oral)   17 97.8 °F (36.6 °C) (Oral)   10/31/16 97.7 °F (36.5 °C) (Oral)     BP Readings from Last 3 Encounters:   17 110/66   17 112/58   10/31/16 118/78     Pulse Readings from Last 3 Encounters:   17 77   17 61   10/31/16 61         Learning Assessment:  :     Learning Assessment 1/15/2014   PRIMARY LEARNER Patient   HIGHEST LEVEL OF EDUCATION - PRIMARY LEARNER  > 4 YEARS OF COLLEGE   BARRIERS PRIMARY LEARNER NONE   CO-LEARNER CAREGIVER No   PRIMARY LANGUAGE ENGLISH   LEARNER PREFERENCE PRIMARY DEMONSTRATION   ANSWERED BY self   RELATIONSHIP SELF       Depression Screening:  :     PHQ over the last two weeks 2017   Little interest or pleasure in doing things Not at all   Feeling down, depressed or hopeless Not at all   Total Score PHQ 2 0       Fall Risk Assessment:  :     Fall Risk Assessment, last 12 mths 2017   Able to walk? Yes   Fall in past 12 months? No       Abuse Screening:  :     Abuse Screening Questionnaire 2017   Do you ever feel afraid of your partner? - N N   Are you in a relationship with someone who physically or mentally threatens you? N N N   Is it safe for you to go home?  Amber Wong       Coordination of Care Questionnaire:  :     1) Have you been to an emergency room, urgent care clinic since your last visit? no   Hospitalized since your last visit? no             2) Have you seen or consulted any other health care providers outside of [address] [place] [address] since your last visit? yes  Rheumatologist for right hip (Include any pap smears or colon screenings in this section.)    3) Do you have an Advance Directive on file? yes  Are you interested in receiving information about Advance Directives? no    Reviewed record in preparation for visit and have obtained necessary documentation. Medication reconciliation up to date and corrected with patient at this time.      Results for orders placed or performed in visit on 08/16/17   AMB POC URINALYSIS DIP STICK AUTO W/O MICRO   Result Value Ref Range    Color (UA POC) Yellow     Clarity (UA POC) Clear     Glucose (UA POC) Negative Negative    Bilirubin (UA POC) Negative Negative    Ketones (UA POC) Negative Negative    Specific gravity (UA POC) 1.010 1.001 - 1.035    Blood (UA POC) Negative Negative    pH (UA POC) 5.5 4.6 - 8.0    Protein (UA POC) Negative Negative mg/dL    Urobilinogen (UA POC) 0.2 mg/dL 0.2 - 1    Nitrites (UA POC) Negative Negative    Leukocyte esterase (UA POC) Negative Negative   AMB POC URINE, MICROALBUMIN, SEMIQUANT (3 RESULTS)   Result Value Ref Range    ALBUMIN, URINE POC 10 Negative mg/L    CREATININE, URINE  mg/dL    Microalbumin/creat ratio (POC) <30 MG/G

## 2017-08-16 NOTE — MR AVS SNAPSHOT
Visit Information Date & Time Provider Department Dept. Phone Encounter #  
 4/49/1917  6:88 AM Sue SmithStaci 108 [phone] [id number] Follow-up Instructions Return in about 6 months (around 2/16/2018). Upcoming Health Maintenance Date Due  
 EYE EXAM RETINAL OR DILATED Q1 9/21/2017 HEMOGLOBIN A1C Q6M 2/16/2018 LIPID PANEL Q1 5/26/2018 FOOT EXAM Q1 8/16/2018 MICROALBUMIN Q1 8/16/2018 DTaP/Tdap/Td series (2 - Td) 11/12/2018 BREAST CANCER SCRN MAMMOGRAM 1/11/2019 PAP AKA CERVICAL CYTOLOGY 3/24/2019 COLONOSCOPY 10/23/2025 Allergies as of 8/16/2017  Review Complete On: 6/21/2444 By: Sue Smith MD  
  
 Severity Noted Reaction Type Reactions Crestor [Rosuvastatin]  12/12/2011   Side Effect Other (comments) Increased CK Simvastatin  12/12/2011    Myalgia Current Immunizations  Reviewed on 10/31/2016 Name Date Influenza Vaccine 10/25/2016, 10/21/2014, 10/8/2012 Influenza Vaccine Split 10/10/2011 TDAP Vaccine 11/12/2008 ZZZ-RETIRED (DO NOT USE) Pneumococcal Vaccine (Unspecified Type) 10/10/2011 Zoster Vaccine, Live 4/22/2015 Not reviewed this visit You Were Diagnosed With   
  
 Codes Comments Type 2 diabetes mellitus without complication, without long-term current use of insulin (HCC)    -  Primary ICD-10-CM: E11.9 ICD-9-CM: 250.00 Hypercholesterolemia     ICD-10-CM: E78.00 ICD-9-CM: 272.0 Hypothyroidism due to acquired atrophy of thyroid     ICD-10-CM: E03.4 ICD-9-CM: 244.8, 246.8 Encounter for long-term current use of medication     ICD-10-CM: Z79.899 ICD-9-CM: V58.69 Vitamin D deficiency     ICD-10-CM: E55.9 ICD-9-CM: 268.9 Sinusitis, unspecified chronicity, unspecified location     ICD-10-CM: J32.9 ICD-9-CM: 473.9 Vitals BP Pulse Temp Resp Height(growth percentile) Weight(growth percentile) 110/66 (BP 1 Location: Left arm, BP Patient Position: Sitting) 77 97.7 °F (36.5 °C) (Oral) 20 5' 3\" (1.6 m) 193 lb 6.4 oz (87.7 kg) SpO2 BMI OB Status Smoking Status 97% 34.26 kg/m2 Postmenopausal Never Smoker BMI and BSA Data Body Mass Index Body Surface Area  
 34.26 kg/m 2 1.97 m 2 Preferred Pharmacy Pharmacy Name Phone [facility]/PHARMACY #[id number] - [place], [place] - [address] [address] [phone] Your Updated Medication List  
  
   
This list is accurate as of: 8/16/17  9:24 AM.  Always use your most recent med list.  
  
  
  
  
 albuterol 90 mcg/actuation inhaler Commonly known as:  VENTOLIN HFA Take 2 Puffs by inhalation every four (4) hours as needed for Wheezing. amoxicillin-clavulanate 875-125 mg per tablet Commonly known as:  AUGMENTIN Take 1 Tab by mouth two (2) times a day for 10 days. aspirin delayed-release 81 mg tablet Take  by mouth daily. CALCIUM 600 + D(3) PO Take  by mouth.  
  
 carisoprodol 250 mg tablet Commonly known as:  SOMA TAKE 1 TABLET BY MOUTH 4 TIMES A DAY AS NEEDED FOR MUSCLE SPASM  
  
 cholecalciferol (vitamin D3) 4,000 unit Cap Commonly known as:  VITAMIN D3 Take 4,000 Units by mouth daily. clotrimazole-betamethasone topical cream  
Commonly known as:  Kieran Vang Apply to rash twice a day. diclofenac EC 75 mg EC tablet Commonly known as:  VOLTAREN  
TAKE 1 TABLET TWICE A DAY AS NEEDED  
  
 ezetimibe 10 mg tablet Commonly known as:  Maria R Ponce Take 1 Tab by mouth daily. Indications: hypercholesterolemia  
  
 ferrous sulfate 325 mg (65 mg iron) tablet Take  by mouth Daily (before breakfast). fluticasone 50 mcg/actuation nasal spray Commonly known as:  FLONASE  
USE 2 SPRAYS TO BOTH NOSTRIL ONCE DAILY  
  
 glucose blood VI test strips strip Commonly known as:  ONETOUCH ULTRA TEST Use as directed  
  
 hydrocortisone 25 mg Supp Commonly known as:  ANUSOL-HC Insert 1 Suppository into rectum every twelve (12) hours as needed. Indications: HEMORRHOIDS  
  
 levothyroxine 125 mcg tablet Commonly known as:  SYNTHROID Take 1 Tab by mouth Daily (before breakfast). Indications: hypothyroidism Liraglutide 0.6 mg/0.1 mL (18 mg/3 mL) sub-q pen Commonly known as:  Lindsey Banks 3-ELVIS INJECT 1.8 MG UNDER THE SKIN ONCE A DAY  
  
 lisinopril 10 mg tablet Commonly known as:  Lollie Jump Take 0.5 Tabs by mouth daily. magnesium 250 mg Tab Take  by mouth.  
  
 metFORMIN  mg tablet Commonly known as:  GLUCOPHAGE XR  
TAKE 2 TABLETS IN THE MORNING AND 2 TABLETS IN THE EVENING  
  
 milk thistle 500 mg Cap Take 500 mg by mouth daily. MULTIVITAMIN PO Take  by mouth. NASONEX 50 mcg/actuation nasal spray Generic drug:  mometasone 2 Sprays daily. OMEGA 3 PO Take 4 Tabs by mouth daily. Omeprazole delayed release 20 mg tablet Commonly known as:  PRILOSEC D/R Take 1 Tab by mouth daily. traMADol 50 mg tablet Commonly known as:  ULTRAM  
Take 1 Tab by mouth every eight (8) hours as needed for Pain. Max Daily Amount: 150 mg.  
  
 venlafaxine-SR 75 mg capsule Commonly known as:  EFFEXOR-XR  
TAKE 1 CAPSULE DAILY  
  
 VITAMIN B-12 2,500 mcg sublingual tablet Generic drug:  cyanocobalamin Take 2,500 mg by mouth daily. WELCHOL 625 mg tablet Generic drug:  colesevelam  
TAKE 3 TABLETS TWICE A DAY WITH MEALS ZyrTEC 10 mg tablet Generic drug:  cetirizine Take 10 mg by mouth daily. Prescriptions Sent to Mail Order Refills  
 cholecalciferol, vitamin D3, (VITAMIN D3) 4,000 unit cap 5 Sig: Take 4,000 Units by mouth daily. Class: Mail Order Pharmacy: [address], [address] [place] [address] #: [phone]  Route: Oral  
 Liraglutide (VICTOZA 3-ELVIS) 0.6 mg/0.1 mL (18 mg/3 mL) sub-q pen 1  
 Sig: INJECT 1.8 MG UNDER THE SKIN ONCE A DAY Class: Mail Order Pharmacy: [address], [address] Ph #: [phone] Prescriptions Sent to Pharmacy Refills  
 ezetimibe (ZETIA) 10 mg tablet 1 Sig: Take 1 Tab by mouth daily. Indications: hypercholesterolemia Class: Normal  
 Pharmacy: [address], [address] Ph #: [phone] Route: Oral  
 amoxicillin-clavulanate (AUGMENTIN) 875-125 mg per tablet 0 Sig: Take 1 Tab by mouth two (2) times a day for 10 days. Class: Normal  
 Pharmacy: [facility]/pharmacy #[id number] - [place], [place] - [address] [address] [facility] [address] Ph #: [phone] Route: Oral  
  
We Performed the Following AMB POC URINALYSIS DIP STICK AUTO W/O MICRO [32111 CPT(R)] AMB POC URINE, MICROALBUMIN, SEMIQUANT (3 RESULTS) [97916 CPT(R)] CBC W/O DIFF [02475 CPT(R)] HEMOGLOBIN A1C WITH EAG [16412 CPT(R)]  DIABETES FOOT EXAM [7 Custom] LIPID PANEL [55738 CPT(R)] METABOLIC PANEL, COMPREHENSIVE [01842 CPT(R)] T4, FREE [id number] CPT(R)] TSH 3RD GENERATION [20958 CPT(R)] VITAMIN D, 25 HYDROXY [id number] CPT(R)] Follow-up Instructions Return in about 6 months (around 2/16/2018). Patient Instructions Diabetes Foot Health: Care Instructions Your Care Instructions When you have diabetes, your feet need extra care and attention. Diabetes can damage the nerve endings and blood vessels in your feet, making you less likely to notice when your feet are injured. Diabetes also limits your body's ability to fight infection and get blood to areas that need it. If you get a minor foot injury, it could become an ulcer or a serious infection. With good foot care, you can prevent most of these problems. Caring for your feet can be quick and easy. Most of the care can be done when you are bathing or getting ready for bed. Follow-up care is a key part of your treatment and safety. Be sure to make and go to all appointments, and call your doctor if you are having problems. Its also a good idea to know your test results and keep a list of the medicines you take. How can you care for yourself at home? · Keep your blood sugar close to normal by watching what and how much you eat, monitoring blood sugar, taking medicines if prescribed, and getting regular exercise. · Do not smoke. Smoking affects blood flow and can make foot problems worse. If you need help quitting, talk to your doctor about stop-smoking programs and medicines. These can increase your chances of quitting for good. · Eat a diet that is low in fats. High fat intake can cause fat to build up in your blood vessels and decrease blood flow. · Inspect your feet daily for blisters, cuts, cracks, or sores. If you cannot see well, use a mirror or have someone help you. · Take care of your feet: 
[facility] AUTHOR[facility] your feet every day. Use warm (not hot) water. Check the water temperature with your wrists or other part of your body, not your feet. ¨ Dry your feet well. Pat them dry. Do not rub the skin on your feet too hard. Dry well between your toes. If the skin on your feet stays moist, bacteria or a fungus can grow, which can lead to infection. ¨ Keep your skin soft. Use moisturizing skin cream to keep the skin on your feet soft and prevent calluses and cracks. But do not put the cream between your toes, and stop using any cream that causes a rash. ¨ Clean underneath your toenails carefully. Do not use a sharp object to clean underneath your toenails. Use the blunt end of a nail file or other rounded tool. ¨ Trim and file your toenails straight across to prevent ingrown toenails. Use a nail clipper, not scissors. Use an emery board to smooth the edges. · Change socks daily.  Socks without seams are best, because seams often rub the feet. You can find socks for people with diabetes from specialty catalogs. · Look inside your shoes every day for things like gravel or torn linings, which could cause blisters or sores. · Buy shoes that fit well: 
¨ Look for shoes that have plenty of space around the toes. This helps prevent bunions and blisters. ¨ Try on shoes while wearing the kind of socks you will usually wear with the shoes. ¨ Avoid plastic shoes. They may rub your feet and cause blisters. Good shoes should be made of materials that are flexible and breathable, such as leather or cloth. ¨ Break in new shoes slowly by wearing them for no more than an hour a day for several days. Take extra time to check your feet for red areas, blisters, or other problems after you wear new shoes. · Do not go barefoot. Do not wear sandals, and do not wear shoes with very thin soles. Thin soles are easy to puncture. They also do not protect your feet from hot pavement or cold weather. · Have your doctor check your feet during each visit. If you have a foot problem, see your doctor. Do not try to treat an early foot problem at home. Home remedies or treatments that you can buy without a prescription (such as corn removers) can be harmful. · Always get early treatment for foot problems. A minor irritation can lead to a major problem if not properly cared for early. When should you call for help? Call your doctor now or seek immediate medical care if: 
· You have a foot sore, an ulcer or break in the skin that is not healing after 4 days, bleeding corns or calluses, or an ingrown toenail. · You have blue or black areas, which can mean bruising or blood flow problems. · You have peeling skin or tiny blisters between your toes or cracking or oozing of the skin. · You have a fever for more than 24 hours and a foot sore. · You have new numbness or tingling in your feet that does not go away after you move your feet or change positions. · You have unexplained or unusual swelling of the foot or ankle. Watch closely for changes in your health, and be sure to contact your doctor if: 
· You cannot do proper foot care. Where can you learn more? Go to http://vance-melinda.info/. Enter A739 in the search box to learn more about \"Diabetes Foot Health: Care Instructions. \" Current as of: March 13, 2017 Content Version: 11.3 © 3530-4029 [employer]kte[employer]. Care instructions adapted under license by [employer] (which disclaims liability or warranty for this information). If you have questions about a medical condition or this instruction, always ask your healthcare professional. [address] any warranty or liability for your use of this information. Introducing [facility]! Dear Jacob Presume: Thank you for requesting a [employer]ushka account. Our records indicate that you already have an active [employer]ushka account. You can access your account anytime at [email or web address] Did you know that you can access your hospital and ER discharge instructions at any time in Nanushka? You can also review all of your test results from your hospital stay or ER visit. Additional Information If you have questions, please visit the Frequently Asked Questions section of the [employer] website at [email or web address] Remember, [employer]ushka is NOT to be used for urgent needs. For medical emergencies, dial 911. Now available from your iPhone and Android! Please provide this summary of care documentation to your next provider. Your primary care clinician is listed as Alicja Ballard. If you have any questions after today's visit, please call [phone].

## 2017-08-16 NOTE — PROGRESS NOTES
Subjective: Kitty Le is a 62 y.o. female seen for follow up of diabetes. She also has hyperlipidemia and obesity. Diabetic Review of Systems - medication compliance: compliant most of the time, diabetic diet compliance: compliant most of the time, home glucose monitoring: is performed regularly, fasting values range 90's. Had cataract surgery last Nov by Dr. Yoselyn Rendon. Has been back to Dr. Jonathon Duenas again after that. Other symptoms and concerns: has sinus and barky cough since weekend. Wheezing in chest. Using inhaler. Also took OTC cough syrup and Zyrtec with decongestant. Also found a knot on right upper arm. Non tender. Size of a small beebee. Patient Active Problem List    Diagnosis Date Noted    Diabetes ([facility] [address] 12/10/2010    Hypercholesterolemia 12/10/2010    Hypothyroidism 12/10/2010    HX: breast cancer 12/10/2010    Environmental allergies 12/10/2010    Arthritis 12/10/2010    Sleep apnea 12/10/2010    Fatty liver 12/10/2010     Current Outpatient Prescriptions   Medication Sig Dispense Refill    cholecalciferol, vitamin D3, (VITAMIN D3) 4,000 unit cap Take 4,000 Units by mouth daily. 90 Cap 5    Liraglutide (VICTOZA 3-ELVIS) 0.6 mg/0.1 mL (18 mg/3 mL) sub-q pen INJECT 1.8 MG UNDER THE SKIN ONCE A DAY 27 mL 1    ezetimibe (ZETIA) 10 mg tablet Take 1 Tab by mouth daily. Indications: hypercholesterolemia 90 Tab 1    levothyroxine (SYNTHROID) 125 mcg tablet Take 1 Tab by mouth Daily (before breakfast). Indications: hypothyroidism 90 Tab 1    albuterol (VENTOLIN HFA) 90 mcg/actuation inhaler Take 2 Puffs by inhalation every four (4) hours as needed for Wheezing. 18 Inhaler 1    WELCHOL 625 mg tablet TAKE 3 TABLETS TWICE A DAY WITH MEALS 540 Tab 3    lisinopril (PRINIVIL, ZESTRIL) 10 mg tablet Take 0.5 Tabs by mouth daily.  90 Tab 3    venlafaxine-SR (EFFEXOR-XR) 75 mg capsule TAKE 1 CAPSULE DAILY 90 Cap 3    metFORMIN ER (GLUCOPHAGE XR) 500 mg tablet TAKE 2 TABLETS IN THE MORNING AND 2 TABLETS IN THE EVENING 360 Tab 3    ferrous sulfate 325 mg (65 mg iron) tablet Take  by mouth Daily (before breakfast).  fluticasone (FLONASE) 50 mcg/actuation nasal spray USE 2 SPRAYS TO BOTH NOSTRIL ONCE DAILY 16 g 5    milk thistle 500 mg cap Take 500 mg by mouth daily. 60 Cap 0    glucose blood VI test strips (ONE TOUCH ULTRA TEST) strip Use as directed 1 Package 11    aspirin delayed-release 81 mg tablet Take  by mouth daily.  Omeprazole delayed release (PRILOSEC D/R) 20 mg tablet Take 1 Tab by mouth daily. 30 Tab 5    cetirizine (ZYRTEC) 10 mg tablet Take 10 mg by mouth daily.  MULTIVITAMIN PO Take  by mouth.  cyanocobalamin (VITAMIN B-12) 2,500 mcg Subl Take 2,500 mg by mouth daily.  CALCIUM CARBONATE/VITAMIN D3 (CALCIUM 600 + D,3, PO) Take  by mouth.  magnesium 250 mg Tab Take  by mouth.  OMEGA-3 FATTY ACIDS (OMEGA 3 PO) Take 4 Tabs by mouth daily.  carisoprodol (SOMA) 250 mg tablet TAKE 1 TABLET BY MOUTH 4 TIMES A DAY AS NEEDED FOR MUSCLE SPASM 90 Tab 1    diclofenac EC (VOLTAREN) 75 mg EC tablet TAKE 1 TABLET TWICE A DAY AS NEEDED 180 Tab 1    hydrocortisone (ANUSOL-HC) 25 mg supp Insert 1 Suppository into rectum every twelve (12) hours as needed. Indications: HEMORRHOIDS 12 Suppository 1    traMADol (ULTRAM) 50 mg tablet Take 1 Tab by mouth every eight (8) hours as needed for Pain. Max Daily Amount: 150 mg. 180 Tab 1    mometasone (NASONEX) 50 mcg/actuation nasal spray 2 Sprays daily.  clotrimazole-betamethasone (LOTRISONE) topical cream Apply to rash twice a day.  45 g 3     Allergies   Allergen Reactions    Crestor [Rosuvastatin] Other (comments)     Increased CK    Simvastatin Myalgia     Past Medical History:   Diagnosis Date    Arthritis 12/10/2010    Breast cancer ([facility] [address]     removed 2/2/09    Diabetes ([facility] [address]     Fatty liver 12/10/2010    Hypercholesterolemia     Sleep apnea 12/10/2010    Thyroid disease      Past Surgical History:   Procedure Laterality Date    ENDOSCOPY, COLON, DIAGNOSTIC  1/2009    HX BREAST LUMPECTOMY  2/2/09    reincision    HX CATARACT REMOVAL Right 11/2016    HX CATARACT REMOVAL Left 11/2016    HX GYN  2010    cervical polypectomy    HX HEENT      HX LITHOTRIPSY  1994    HX ORTHOPAEDIC      foot    HX TONSIL AND ADENOIDECTOMY  1960     Family History   Problem Relation Age of Onset    Cancer Mother     COPD Mother    Kenton Sous Cancer Father      mesothelioma    Arthritis-rheumatoid Sister      Social History   Substance Use Topics    Smoking status: Never Smoker    Smokeless tobacco: Never Used    Alcohol use No        Lab Results  Component Value Date/Time   Hemoglobin A1c 5.9 01/25/2017 10:12 AM   Hemoglobin A1c 5.8 07/20/2016 09:05 AM   Hemoglobin A1c 6.2 04/07/2016 10:21 AM   Glucose 77 01/25/2017 10:12 AM   Microalb/Creat ratio (ug/mg creat.) 3.0 07/11/2011 09:22 AM   LDL, calculated 74 05/26/2017 08:47 AM   Creatinine 0.70 01/25/2017 10:12 AM      Lab Results  Component Value Date/Time   Cholesterol, total 179 05/26/2017 08:47 AM   HDL Cholesterol 53 05/26/2017 08:47 AM   LDL, calculated 74 05/26/2017 08:47 AM   Triglyceride 258 05/26/2017 08:47 AM   Lab Results  Component Value Date/Time   TSH 5.460 05/26/2017 08:47 AM   T4, Free 1.03 05/26/2017 08:47 AM                 Review of Systems  Pertinent items are noted in HPI. Objective:   Visit Vitals    /66 (BP 1 Location: Left arm, BP Patient Position: Sitting)    Pulse 77    Temp 97.7 °F (36.5 °C) (Oral)    Resp 20    Ht 5' 3\" (1.6 m)    Wt 193 lb 6.4 oz (87.7 kg)    SpO2 97%    BMI 34.26 kg/m2     Appearance: alert, well appearing, and in no distress and overweight. Exam: ENT: clear TM's. Pharynx mild redness. Neck supple.     heart sounds normal rate, regular rhythm, normal S1, S2, no murmurs, rubs, clicks or gallops, chest clear  Diabetic foot exam:     Left:    Filament test normal sensation with micro filament   Pulse DP: 2+ (normal)   Pulse PT: 2+ (normal)   Deformities: Yes - bunion  Right:    Filament test normal sensation with micro filament   Pulse DP: 2+ (normal)   Pulse PT: 2+ (normal)   Deformities: None  Lab review: orders written for new lab studies as appropriate; see orders. Assessment/Plan:     diabetes , hyperlipidemia . Diabetic issues reviewed with her: glycohemoglobin and other lab monitoring discussed. ICD-10-CM ICD-9-CM    1. Type 2 diabetes mellitus without complication, without long-term current use of insulin (HCC) E11.9 250.00 AMB POC URINALYSIS DIP STICK AUTO W/O MICRO      AMB POC URINE, MICROALBUMIN, SEMIQUANT (3 RESULTS)       DIABETES FOOT EXAM      Liraglutide (VICTOZA 3-ELVIS) 0.6 mg/0.1 mL (18 mg/3 mL) sub-q pen      HEMOGLOBIN A1C WITH EAG   2. Hypercholesterolemia E78.00 272.0 ezetimibe (ZETIA) 10 mg tablet      LIPID PANEL   3. Hypothyroidism due to acquired atrophy of thyroid E03.4 244.8 TSH 3RD GENERATION     246.8 T4, FREE   4. Encounter for long-term current use of medication Z79.899 V58.69 CBC W/O DIFF      METABOLIC PANEL, COMPREHENSIVE   5. Vitamin D deficiency E55.9 268.9 cholecalciferol, vitamin D3, (VITAMIN D3) 4,000 unit cap      VITAMIN D, 25 HYDROXY   6.  Sinusitis, unspecified chronicity, unspecified location J32.9 473.9 amoxicillin-clavulanate (AUGMENTIN) 875-125 mg per tablet

## 2017-08-16 NOTE — PATIENT INSTRUCTIONS
Diabetes Foot Health: Care Instructions  Your Care Instructions    When you have diabetes, your feet need extra care and attention. Diabetes can damage the nerve endings and blood vessels in your feet, making you less likely to notice when your feet are injured. Diabetes also limits your body's ability to fight infection and get blood to areas that need it. If you get a minor foot injury, it could become an ulcer or a serious infection. With good foot care, you can prevent most of these problems. Caring for your feet can be quick and easy. Most of the care can be done when you are bathing or getting ready for bed. Follow-up care is a key part of your treatment and safety. Be sure to make and go to all appointments, and call your doctor if you are having problems. Its also a good idea to know your test results and keep a list of the medicines you take. How can you care for yourself at home? · Keep your blood sugar close to normal by watching what and how much you eat, monitoring blood sugar, taking medicines if prescribed, and getting regular exercise. · Do not smoke. Smoking affects blood flow and can make foot problems worse. If you need help quitting, talk to your doctor about stop-smoking programs and medicines. These can increase your chances of quitting for good. · Eat a diet that is low in fats. High fat intake can cause fat to build up in your blood vessels and decrease blood flow. · Inspect your feet daily for blisters, cuts, cracks, or sores. If you cannot see well, use a mirror or have someone help you. · Take care of your feet:  [facility] your feet every day. Use warm (not hot) water. Check the water temperature with your wrists or other part of your body, not your feet. ¨ Dry your feet well. Pat them dry. Do not rub the skin on your feet too hard. Dry well between your toes. If the skin on your feet stays moist, bacteria or a fungus can grow, which can lead to infection.   ¨ Keep your skin soft. Use moisturizing skin cream to keep the skin on your feet soft and prevent calluses and cracks. But do not put the cream between your toes, and stop using any cream that causes a rash. ¨ Clean underneath your toenails carefully. Do not use a sharp object to clean underneath your toenails. Use the blunt end of a nail file or other rounded tool. ¨ Trim and file your toenails straight across to prevent ingrown toenails. Use a nail clipper, not scissors. Use an emery board to smooth the edges. · Change socks daily. Socks without seams are best, because seams often rub the feet. You can find socks for people with diabetes from specialty catalogs. · Look inside your shoes every day for things like gravel or torn linings, which could cause blisters or sores. · Buy shoes that fit well:  ¨ Look for shoes that have plenty of space around the toes. This helps prevent bunions and blisters. ¨ Try on shoes while wearing the kind of socks you will usually wear with the shoes. ¨ Avoid plastic shoes. They may rub your feet and cause blisters. Good shoes should be made of materials that are flexible and breathable, such as leather or cloth. ¨ Break in new shoes slowly by wearing them for no more than an hour a day for several days. Take extra time to check your feet for red areas, blisters, or other problems after you wear new shoes. · Do not go barefoot. Do not wear sandals, and do not wear shoes with very thin soles. Thin soles are easy to puncture. They also do not protect your feet from hot pavement or cold weather. · Have your doctor check your feet during each visit. If you have a foot problem, see your doctor. Do not try to treat an early foot problem at home. Home remedies or treatments that you can buy without a prescription (such as corn removers) can be harmful. · Always get early treatment for foot problems. A minor irritation can lead to a major problem if not properly cared for early.   When should you call for help? Call your doctor now or seek immediate medical care if:  · You have a foot sore, an ulcer or break in the skin that is not healing after 4 days, bleeding corns or calluses, or an ingrown toenail. · You have blue or black areas, which can mean bruising or blood flow problems. · You have peeling skin or tiny blisters between your toes or cracking or oozing of the skin. · You have a fever for more than 24 hours and a foot sore. · You have new numbness or tingling in your feet that does not go away after you move your feet or change positions. · You have unexplained or unusual swelling of the foot or ankle. Watch closely for changes in your health, and be sure to contact your doctor if:  · You cannot do proper foot care. Where can you learn more? Go to http://vance-melinda.info/. Enter A739 in the search box to learn more about \"Diabetes Foot Health: Care Instructions. \"  Current as of: March 13, 2017  Content Version: 11.3  © 5543-4806 [employer]. Care instructions adapted under license by [employer] (which disclaims liability or warranty for this information). If you have questions about a medical condition or this instruction, always ask your healthcare professional. [address] any warranty or liability for your use of this information.

## 2017-08-17 ENCOUNTER — TELEPHONE (OUTPATIENT)
Dept: FAMILY MEDICINE CLINIC | Age: 62
End: 2017-08-17

## 2017-08-17 LAB
25(OH)D3+25(OH)D2 SERPL-MCNC: 31.7 NG/ML (ref 30–100)
ALBUMIN SERPL-MCNC: 4.3 G/DL (ref 3.6–4.8)
ALBUMIN/GLOB SERPL: 1.6 {RATIO} (ref 1.2–2.2)
ALP SERPL-CCNC: 73 IU/L (ref 39–117)
ALT SERPL-CCNC: 54 IU/L (ref 0–32)
AST SERPL-CCNC: 34 IU/L (ref 0–40)
BILIRUB SERPL-MCNC: <0.2 MG/DL (ref 0–1.2)
BUN SERPL-MCNC: 13 MG/DL (ref 8–27)
BUN/CREAT SERPL: 19 (ref 12–28)
CALCIUM SERPL-MCNC: 9.5 MG/DL (ref 8.7–10.3)
CHLORIDE SERPL-SCNC: 98 MMOL/L (ref 96–106)
CHOLEST SERPL-MCNC: 203 MG/DL (ref 100–199)
CO2 SERPL-SCNC: 23 MMOL/L (ref 18–29)
CREAT SERPL-MCNC: 0.67 MG/DL (ref 0.57–1)
ERYTHROCYTE [DISTWIDTH] IN BLOOD BY AUTOMATED COUNT: 13.8 % (ref 12.3–15.4)
EST. AVERAGE GLUCOSE BLD GHB EST-MCNC: 126 MG/DL
GLOBULIN SER CALC-MCNC: 2.7 G/DL (ref 1.5–4.5)
GLUCOSE SERPL-MCNC: 86 MG/DL (ref 65–99)
HBA1C MFR BLD: 6 % (ref 4.8–5.6)
HCT VFR BLD AUTO: 42 % (ref 34–46.6)
HDLC SERPL-MCNC: 61 MG/DL
HGB BLD-MCNC: 13.5 G/DL (ref 11.1–15.9)
INTERPRETATION, 910389: NORMAL
LDLC SERPL CALC-MCNC: 99 MG/DL (ref 0–99)
Lab: NORMAL
MCH RBC QN AUTO: 29.8 PG (ref 26.6–33)
MCHC RBC AUTO-ENTMCNC: 32.1 G/DL (ref 31.5–35.7)
MCV RBC AUTO: 93 FL (ref 79–97)
PLATELET # BLD AUTO: 265 X10E3/UL (ref 150–379)
POTASSIUM SERPL-SCNC: 4.7 MMOL/L (ref 3.5–5.2)
PROT SERPL-MCNC: 7 G/DL (ref 6–8.5)
RBC # BLD AUTO: 4.53 X10E6/UL (ref 3.77–5.28)
SODIUM SERPL-SCNC: 139 MMOL/L (ref 134–144)
T4 FREE SERPL-MCNC: 1.08 NG/DL (ref 0.82–1.77)
TRIGL SERPL-MCNC: 216 MG/DL (ref 0–149)
TSH SERPL DL<=0.005 MIU/L-ACNC: 6.37 UIU/ML (ref 0.45–4.5)
VLDLC SERPL CALC-MCNC: 43 MG/DL (ref 5–40)
WBC # BLD AUTO: 6.7 X10E3/UL (ref 3.4–10.8)

## 2017-08-17 RX ORDER — LEVOTHYROXINE SODIUM 150 UG/1
150 TABLET ORAL
Qty: 90 TAB | Refills: 1 | Status: SHIPPED | OUTPATIENT
Start: 2017-08-17 | End: 2017-08-21 | Stop reason: SDUPTHER

## 2017-08-17 NOTE — PROGRESS NOTES
Labs show need to increase dose of Levothyroxine more. I will send in new prescription. Good sugar control. Total cholesterol is up since last test.  Watch diet. Plan to check just thyroid again in 2-3 months.

## 2017-08-19 ENCOUNTER — PATIENT MESSAGE (OUTPATIENT)
Dept: FAMILY MEDICINE CLINIC | Age: 62
End: 2017-08-19

## 2017-08-21 RX ORDER — LEVOTHYROXINE SODIUM 150 UG/1
150 TABLET ORAL
Qty: 90 TAB | Refills: 1 | Status: SHIPPED | COMMUNITY
Start: 2017-08-21 | End: 2018-04-18 | Stop reason: SDUPTHER

## 2017-10-04 ENCOUNTER — TELEPHONE (OUTPATIENT)
Dept: FAMILY MEDICINE CLINIC | Age: 62
End: 2017-10-04

## 2017-10-04 DIAGNOSIS — M19.90 ARTHRITIS: ICD-10-CM

## 2017-10-04 RX ORDER — TRAMADOL HYDROCHLORIDE 50 MG/1
50 TABLET ORAL
Qty: 180 TAB | Refills: 1 | Status: SHIPPED | OUTPATIENT
Start: 2017-10-04 | End: 2018-05-30 | Stop reason: SDUPTHER

## 2017-10-04 NOTE — TELEPHONE ENCOUNTER
From: Kaitlin Hardy  To:  Jose Alfredo Renee MD  Sent: 10/4/2017 9:40 AM EDT  Subject: Medication Renewal Request    Original authorizing provider: MD Kaitlin Mckeon would like a refill of the following medications:  traMADol (ULTRAM) 50 mg tablet Jose Alfredo Renee MD]    Preferred pharmacy: [address] [place] [address] 76:

## 2018-01-26 RX ORDER — COLESEVELAM HYDROCHLORIDE 625 MG/1
TABLET, FILM COATED ORAL
Qty: 540 TAB | Refills: 3 | Status: SHIPPED | OUTPATIENT
Start: 2018-01-26 | End: 2019-01-21 | Stop reason: SDUPTHER

## 2018-01-26 RX ORDER — VENLAFAXINE HYDROCHLORIDE 75 MG/1
CAPSULE, EXTENDED RELEASE ORAL
Qty: 90 CAP | Refills: 3 | Status: SHIPPED | OUTPATIENT
Start: 2018-01-26 | End: 2019-01-21 | Stop reason: SDUPTHER

## 2018-01-26 RX ORDER — METFORMIN HYDROCHLORIDE 500 MG/1
TABLET, EXTENDED RELEASE ORAL
Qty: 360 TAB | Refills: 3 | Status: SHIPPED | OUTPATIENT
Start: 2018-01-26 | End: 2019-01-21 | Stop reason: SDUPTHER

## 2018-02-23 ENCOUNTER — TELEPHONE (OUTPATIENT)
Dept: FAMILY MEDICINE CLINIC | Age: 63
End: 2018-02-23

## 2018-02-23 NOTE — TELEPHONE ENCOUNTER
Please see the preventative eye exam info for charting:    Reason: To address the following health maintenance concerns.      Eye Exam Retinal Or Dilated Q1          Comments:     I have an eye appointment set up for February 27th. The patient does not need an insurance referral. This is for documentation purposes only.

## 2018-03-02 ENCOUNTER — TELEPHONE (OUTPATIENT)
Dept: FAMILY MEDICINE CLINIC | Age: 63
End: 2018-03-02

## 2018-03-02 RX ORDER — PEN NEEDLE, DIABETIC 31 GX3/16"
NEEDLE, DISPOSABLE MISCELLANEOUS
Qty: 30 PEN NEEDLE | Refills: 12 | Status: SHIPPED | OUTPATIENT
Start: 2018-03-02 | End: 2018-03-05 | Stop reason: SDUPTHER

## 2018-03-02 NOTE — TELEPHONE ENCOUNTER
----- Message from July Mann LPN sent at 3/5/3954  9:02 AM EST -----  Regarding: FW: Prescription Question  Contact: [phone]      ----- Message -----     From: Nuha Gray     Sent: 3/2/2018   8:35 AM       To: [facility] Nurse Pool  Subject: Prescription Question                            I am trying to order needles for my Victoza and I am completely out. It is from [facility] and they say they are waiting for an approval from my doctor. Can you please approve it? Also can you call in a prescription at [facility] because I really need them! the generic is fine. Thank you!

## 2018-03-05 ENCOUNTER — OFFICE VISIT (OUTPATIENT)
Dept: FAMILY MEDICINE CLINIC | Age: 63
End: 2018-03-05

## 2018-03-05 VITALS
RESPIRATION RATE: 20 BRPM | SYSTOLIC BLOOD PRESSURE: 104 MMHG | BODY MASS INDEX: 34.34 KG/M2 | TEMPERATURE: 97.6 F | WEIGHT: 193.8 LBS | DIASTOLIC BLOOD PRESSURE: 60 MMHG | HEIGHT: 63 IN | OXYGEN SATURATION: 98 % | HEART RATE: 63 BPM

## 2018-03-05 DIAGNOSIS — E55.9 VITAMIN D DEFICIENCY: ICD-10-CM

## 2018-03-05 DIAGNOSIS — E78.00 HYPERCHOLESTEROLEMIA: ICD-10-CM

## 2018-03-05 DIAGNOSIS — M19.90 ARTHRITIS: ICD-10-CM

## 2018-03-05 DIAGNOSIS — E11.9 TYPE 2 DIABETES MELLITUS WITHOUT COMPLICATION, WITHOUT LONG-TERM CURRENT USE OF INSULIN (HCC): Primary | ICD-10-CM

## 2018-03-05 DIAGNOSIS — Z79.899 ENCOUNTER FOR LONG-TERM CURRENT USE OF MEDICATION: ICD-10-CM

## 2018-03-05 DIAGNOSIS — E03.4 HYPOTHYROIDISM DUE TO ACQUIRED ATROPHY OF THYROID: ICD-10-CM

## 2018-03-05 RX ORDER — PEN NEEDLE, DIABETIC 31 GX3/16"
NEEDLE, DISPOSABLE MISCELLANEOUS
Qty: 30 PEN NEEDLE | Refills: 4 | Status: SHIPPED | OUTPATIENT
Start: 2018-03-05 | End: 2019-12-04 | Stop reason: SDUPTHER

## 2018-03-05 RX ORDER — PEN NEEDLE, DIABETIC 31 GX3/16"
NEEDLE, DISPOSABLE MISCELLANEOUS
Qty: 90 PEN NEEDLE | Refills: 4 | Status: SHIPPED | COMMUNITY
Start: 2018-03-05 | End: 2018-03-05 | Stop reason: SDUPTHER

## 2018-03-05 NOTE — PROGRESS NOTES
Identified pt with two pt identifiers(name and ). Chief Complaint   Patient presents with    Diabetes    Hypertension        Health Maintenance Due   Topic    EYE EXAM RETINAL OR DILATED Q1     HEMOGLOBIN A1C Q6M    18 Dr Jesus Balderrama eye exam    Wt Readings from Last 3 Encounters:   18 193 lb 12.8 oz (87.9 kg)   17 193 lb 6.4 oz (87.7 kg)   17 192 lb 9.6 oz (87.4 kg)     Temp Readings from Last 3 Encounters:   18 97.6 °F (36.4 °C) (Oral)   17 97.7 °F (36.5 °C) (Oral)   17 97.8 °F (36.6 °C) (Oral)     BP Readings from Last 3 Encounters:   18 104/60   17 110/66   17 112/58     Pulse Readings from Last 3 Encounters:   18 63   17 77   17 61         Learning Assessment:  :     Learning Assessment 1/15/2014   PRIMARY LEARNER Patient   HIGHEST LEVEL OF EDUCATION - PRIMARY LEARNER  > 4 YEARS OF COLLEGE   BARRIERS PRIMARY LEARNER NONE   CO-LEARNER CAREGIVER No   PRIMARY LANGUAGE ENGLISH   LEARNER PREFERENCE PRIMARY DEMONSTRATION   ANSWERED BY self   RELATIONSHIP SELF       Depression Screening:  :     PHQ over the last two weeks 2017   Little interest or pleasure in doing things Not at all   Feeling down, depressed or hopeless Not at all   Total Score PHQ 2 0       Fall Risk Assessment:  :     Fall Risk Assessment, last 12 mths 2017   Able to walk? Yes   Fall in past 12 months? No       Abuse Screening:  :     Abuse Screening Questionnaire 2017   Do you ever feel afraid of your partner? - N N   Are you in a relationship with someone who physically or mentally threatens you? N N N   Is it safe for you to go home?  Phylliss Presume       Coordination of Care Questionnaire:  :     1) Have you been to an emergency room, urgent care clinic since your last visit? no   Hospitalized since your last visit? no             2) Have you seen or consulted any other health care providers outside of [address] [place] [address] since your last visit? no  (Include any pap smears or colon screenings in this section.)    3) Do you have an Advance Directive on file? yes  Are you interested in receiving information about Advance Directives? no    Reviewed record in preparation for visit and have obtained necessary documentation. Medication reconciliation up to date and corrected with patient at this time.

## 2018-03-05 NOTE — MR AVS SNAPSHOT
[address] [place] [address] 
 
 
 [facility] [address] [facility] 
[phone] Patient: Felecia Polanco MRN: [id number] :1955 Visit Information Date & Time Provider Department Dept. Phone Encounter #  
 3/1/6710  1:94 AM Alen Gallegos Simon [phone] [id number] Upcoming Health Maintenance Date Due  
 EYE EXAM RETINAL OR DILATED Q1 2017 FOOT EXAM Q1 2018 MICROALBUMIN Q1 2018 LIPID PANEL Q1 2018 HEMOGLOBIN A1C Q6M 2018 DTaP/Tdap/Td series (2 - Td) 2018 BREAST CANCER SCRN MAMMOGRAM 2019 PAP AKA CERVICAL CYTOLOGY 3/24/2019 COLONOSCOPY 10/23/2025 Allergies as of 3/5/2018  Review Complete On:  By: Agustin Landers MD  
  
 Severity Noted Reaction Type Reactions Crestor [Rosuvastatin]  2011   Side Effect Other (comments) Increased CK Simvastatin  2011    Myalgia Current Immunizations  Reviewed on 10/2/2017 Name Date Influenza Vaccine 2017, 10/25/2016, 10/21/2014, 10/8/2012 Influenza Vaccine Split 10/10/2011 Pneumococcal Conjugate (PCV-13) 2017 TDAP Vaccine 2008 ZZZ-RETIRED (DO NOT USE) Pneumococcal Vaccine (Unspecified Type) 10/10/2011 Zoster Vaccine, Live 2015 Not reviewed this visit You Were Diagnosed With   
  
 Codes Comments Type 2 diabetes mellitus without complication, without long-term current use of insulin (HCC)    -  Primary ICD-10-CM: E11.9 ICD-9-CM: 250.00 Hypercholesterolemia     ICD-10-CM: E78.00 ICD-9-CM: 272.0 Hypothyroidism due to acquired atrophy of thyroid     ICD-10-CM: E03.4 ICD-9-CM: 244.8, 246.8 Encounter for long-term current use of medication     ICD-10-CM: Z79.899 ICD-9-CM: V58.69 Vitamin D deficiency     ICD-10-CM: E55.9 ICD-9-CM: 268.9 Arthritis     ICD-10-CM: M19.90 ICD-9-CM: 716.90 Vitals BP Pulse Temp Resp Height(growth percentile) Weight(growth percentile) 104/60 (BP 1 Location: Left arm, BP Patient Position: Sitting) 63 97.6 °F (36.4 °C) (Oral) 20 5' 3\" (1.6 m) 193 lb 12.8 oz (87.9 kg) SpO2 BMI OB Status Smoking Status 98% 34.33 kg/m2 Postmenopausal Never Smoker BMI and BSA Data Body Mass Index Body Surface Area  
 34.33 kg/m 2 1.98 m 2 Preferred Pharmacy Pharmacy Name Phone [id number] [place] [facility] [address] [address] [phone] Your Updated Medication List  
  
   
This list is accurate as of 3/5/18  9:09 AM.  Always use your most recent med list.  
  
  
  
  
 albuterol 90 mcg/actuation inhaler Commonly known as:  VENTOLIN HFA Take 2 Puffs by inhalation every four (4) hours as needed for Wheezing. aspirin delayed-release 81 mg tablet Take  by mouth daily. CALCIUM 600 + D(3) PO Take  by mouth.  
  
 carisoprodol 250 mg tablet Commonly known as:  SOMA TAKE 1 TABLET BY MOUTH 4 TIMES A DAY AS NEEDED FOR MUSCLE SPASM  
  
 cholecalciferol (vitamin D3) 4,000 unit Cap Commonly known as:  VITAMIN D3 Take 4,000 Units by mouth daily. clotrimazole-betamethasone topical cream  
Commonly known as:  Reford Hinders Apply to rash twice a day. diclofenac EC 75 mg EC tablet Commonly known as:  VOLTAREN  
TAKE 1 TABLET TWICE A DAY AS NEEDED  
  
 ezetimibe 10 mg tablet Commonly known as:  Woo Santos Take 1 Tab by mouth daily. Indications: hypercholesterolemia  
  
 ferrous sulfate 325 mg (65 mg iron) tablet Take  by mouth Daily (before breakfast). fluticasone 50 mcg/actuation nasal spray Commonly known as:  FLONASE  
USE 2 SPRAYS TO BOTH NOSTRIL ONCE DAILY  
  
 glucose blood VI test strips strip Commonly known as:  ONETOUCH ULTRA TEST Use as directed  
  
 hydrocortisone 25 mg Supp Commonly known as:  ANUSOL-HC  
 Insert 1 Suppository into rectum every twelve (12) hours as needed. Indications: HEMORRHOIDS Insulin Needles (Disposable) 32 gauge x 5/32\" Ndle Commonly known as:  Melony Pen Needle Use for Victoza pen one daily. levothyroxine 150 mcg tablet Commonly known as:  SYNTHROID Take 1 Tab by mouth Daily (before breakfast). Indications: hypothyroidism Liraglutide 0.6 mg/0.1 mL (18 mg/3 mL) Pnij Commonly known as:  Rolanda Pinch 3-ELVIS INJECT 1.8 MG UNDER THE SKIN ONCE A DAY  
  
 lisinopril 10 mg tablet Commonly known as:  Tyra Ghosh Take 0.5 Tabs by mouth daily. magnesium 250 mg Tab Take  by mouth.  
  
 metFORMIN  mg tablet Commonly known as:  GLUCOPHAGE XR  
TAKE 2 TABLETS IN THE MORNING AND 2 TABLETS IN THE EVENING  
  
 milk thistle 500 mg Cap Take 500 mg by mouth daily. MULTIVITAMIN PO Take  by mouth. NASONEX 50 mcg/actuation nasal spray Generic drug:  mometasone 2 Sprays daily. OMEGA 3 PO Take 4 Tabs by mouth daily. Omeprazole delayed release 20 mg tablet Commonly known as:  PRILOSEC D/R Take 1 Tab by mouth daily. traMADol 50 mg tablet Commonly known as:  ULTRAM  
Take 1 Tab by mouth every eight (8) hours as needed for Pain. Max Daily Amount: 150 mg.  
  
 venlafaxine-SR 75 mg capsule Commonly known as:  EFFEXOR-XR  
TAKE 1 CAPSULE DAILY  
  
 VITAMIN B-12 2,500 mcg sublingual tablet Generic drug:  cyanocobalamin Take 2,500 mg by mouth daily. WELCHOL 625 mg tablet Generic drug:  colesevelam  
TAKE 3 TABLETS TWICE A DAY WITH MEALS ZyrTEC 10 mg tablet Generic drug:  cetirizine Take 10 mg by mouth daily. Prescriptions Sent to Pharmacy Refills Insulin Needles, Disposable, (MELONY PEN NEEDLE) 32 gauge x 5/32\" ndle 4 Sig: Use for Victoza pen one daily. Class: Normal  
 Pharmacy: [facility]/pharmacy #[id number] - [place] - [address] [facility] [address] Ph #: [phone] We Performed the Following CBC W/O DIFF [47940 CPT(R)] HEMOGLOBIN A1C WITH EAG [61592 CPT(R)] LIPID PANEL [10376 CPT(R)] METABOLIC PANEL, COMPREHENSIVE [25791 CPT(R)] T4, FREE [id number] CPT(R)] TSH 3RD GENERATION [87606 CPT(R)] VITAMIN D, 25 HYDROXY [id number] CPT(R)] Patient Instructions Body Mass Index: Care Instructions Your Care Instructions Body mass index (BMI) can help you see if your weight is raising your risk for health problems. It uses a formula to compare how much you weigh with how tall you are. · A BMI lower than 18.5 is considered underweight. · A BMI between 18.5 and 24.9 is considered healthy. · A BMI between 25 and 29.9 is considered overweight. A BMI of 30 or higher is considered obese. If your BMI is in the normal range, it means that you have a lower risk for weight-related health problems. If your BMI is in the overweight or obese range, you may be at increased risk for weight-related health problems, such as high blood pressure, heart disease, stroke, arthritis or joint pain, and diabetes. If your BMI is in the underweight range, you may be at increased risk for health problems such as fatigue, lower protection (immunity) against illness, muscle loss, bone loss, hair loss, and hormone problems. BMI is just one measure of your risk for weight-related health problems. You may be at higher risk for health problems if you are not active, you eat an unhealthy diet, or you drink too much alcohol or use tobacco products. Follow-up care is a key part of your treatment and safety. Be sure to make and go to all appointments, and call your doctor if you are having problems. It's also a good idea to know your test results and keep a list of the medicines you take. How can you care for yourself at home? · Practice healthy eating habits. This includes eating plenty of fruits, vegetables, whole grains, lean protein, and low-fat dairy. · If your doctor recommends it, get more exercise. Walking is a good choice. Bit by bit, increase the amount you walk every day. Try for at least 30 minutes on most days of the week. · Do not smoke. Smoking can increase your risk for health problems. If you need help quitting, talk to your doctor about stop-smoking programs and medicines. These can increase your chances of quitting for good. · Limit alcohol to 2 drinks a day for men and 1 drink a day for women. Too much alcohol can cause health problems. If you have a BMI higher than 25 · Your doctor may do other tests to check your risk for weight-related health problems. This may include measuring the distance around your waist. A waist measurement of more than 40 inches in men or 35 inches in women can increase the risk of weight-related health problems. · Talk with your doctor about steps you can take to stay healthy or improve your health. You may need to make lifestyle changes to lose weight and stay healthy, such as changing your diet and getting regular exercise. If you have a BMI lower than 18.5 · Your doctor may do other tests to check your risk for health problems. · Talk with your doctor about steps you can take to stay healthy or improve your health. You may need to make lifestyle changes to gain or maintain weight and stay healthy, such as getting more healthy foods in your diet and doing exercises to build muscle. Where can you learn more? Go to http://vance-melinda.info/. Enter S176 in the search box to learn more about \"Body Mass Index: Care Instructions. \" Current as of: October 13, 2016 Content Version: 11.4 © 5912-4581 "[employer]. Care instructions adapted under license by [employer] (which disclaims liability or warranty for this information).  If you have questions about a medical condition or this instruction, always ask your healthcare professional. [employer] disclaims any warranty or liability for your use of this information. Hand Arthritis: Exercises Your Care Instructions Here are some examples of exercises for hand arthritis. Start each exercise slowly. Ease off the exercise if you start to have pain. Your doctor or your physical or occupational therapist will tell you when you can start these exercises and which ones will work best for you. How to do the exercises Tendon glides 1. In this exercise, the steps follow one another to a make a continuous movement. 2. With your affected hand, point your fingers and thumb straight up. Your wrist should be relaxed, following the line of your fingers and thumb. 3. Curl your fingers so that the top two joints in them are bent, and your fingers wrap down. Your fingertips should touch or be near the base of your fingers. Your fingers will look like a hook. 4. Make a fist by bending your knuckles. Your thumb can gently rest against your index (pointing) finger. 5. Unwind your fingers slightly so that your fingertips can touch the base of your palm. Your thumb can rest against your index finger. 6. Move back to your starting position, with your fingers and thumb pointing up. 7. Repeat the series of motions 8 to 12 times. 8. Switch hands and repeat steps 1 through 6, even if only one hand is sore. Intrinsic flexion 1. Rest your affected hand on a table and bend the large joints where your fingers connect to your hand. Keep your thumb and the other joints in your fingers straight. 2. Slowly straighten your fingers. Your wrist should be relaxed, following the line of your fingers and thumb. 3. Move back to your starting position, with your hand bent. 4. Repeat 8 to 12 times. 5. Switch hands and repeat steps 1 through 4, even if only one hand is sore. Finger extension 1. Place your affected hand flat on a table. 2. Lift and then lower one finger at a time off the table. 3. Repeat 8 to 12 times. 4. Switch hands and repeat steps 1 through 3, even if only one hand is sore. MP extension 1. Place your good hand on a table, palm up. Put your affected hand on top of your good hand with your fingers wrapped around the thumb of your good hand like you are making a fist. 
2. Slowly uncurl the joints of your affected hand where your fingers connect to your hand so that only the top two joints of your fingers are bent. Your fingers will look like a hook. 3. Move back to your starting position, with your fingers wrapped around your good thumb. 4. Repeat 8 to 12 times. 5. Switch hands and repeat steps 1 through 4, even if only one hand is sore. PIP extension (with MP extension) 1. Place your good hand on a table, palm up. Put your affected hand on top of your good hand, palm up. 2. Use the thumb and fingers of your good hand to grasp below the middle joint of one finger of your affected hand. 3. Straighten the last two joints of that finger. 4. Repeat 8 to 12 times. 5. Repeat steps 1 through 4 with each finger. 6. Switch hands and repeat steps 1 through 5, even if only one hand is sore. DIP flexion 1. With your good hand, grasp one finger of your affected hand. Your thumb will be on the top side of your finger just below the joint that is closest to your fingernail. 2. Slowly bend your affected finger only at the joint closest to your fingernail. 3. Repeat 8 to 12 times. 4. Repeat steps 1 through 3 with each finger. 5. Switch hands and repeat steps 1 through 4, even if only one hand is sore. Follow-up care is a key part of your treatment and safety. Be sure to make and go to all appointments, and call your doctor if you are having problems. It's also a good idea to know your test results and keep a list of the medicines you take. Where can you learn more? Go to http://vance-melinda.info/. Enter A048 in the search box to learn more about \"Hand Arthritis: Exercises. \" Current as of: March 21, 2017 Content Version: 11.4 © 2331-1243 [employer]. Care instructions adapted under license by [employer] (which disclaims liability or warranty for this information). If you have questions about a medical condition or this instruction, always ask your healthcare professional. [address] any warranty or liability for your use of this information. Introducing [facility]! Dear Gareth Cramer: Thank you for requesting a KarmaKey account. Our records indicate that you already have an active KarmaKey account. You can access your account anytime at [email or web address] Did you know that you can access your hospital and ER discharge instructions at any time in KarmaKey? You can also review all of your test results from your hospital stay or ER visit. Additional Information If you have questions, please visit the Frequently Asked Questions section of the KarmaKey website at [email or web address] Remember, KarmaKey is NOT to be used for urgent needs. For medical emergencies, dial 911. Now available from your iPhone and Android! Please provide this summary of care documentation to your next provider. Your primary care clinician is listed as Shanta Arevalo. If you have any questions after today's visit, please call [phone].

## 2018-03-05 NOTE — PATIENT INSTRUCTIONS
Body Mass Index: Care Instructions  Your Care Instructions    Body mass index (BMI) can help you see if your weight is raising your risk for health problems. It uses a formula to compare how much you weigh with how tall you are. · A BMI lower than 18.5 is considered underweight. · A BMI between 18.5 and 24.9 is considered healthy. · A BMI between 25 and 29.9 is considered overweight. A BMI of 30 or higher is considered obese. If your BMI is in the normal range, it means that you have a lower risk for weight-related health problems. If your BMI is in the overweight or obese range, you may be at increased risk for weight-related health problems, such as high blood pressure, heart disease, stroke, arthritis or joint pain, and diabetes. If your BMI is in the underweight range, you may be at increased risk for health problems such as fatigue, lower protection (immunity) against illness, muscle loss, bone loss, hair loss, and hormone problems. BMI is just one measure of your risk for weight-related health problems. You may be at higher risk for health problems if you are not active, you eat an unhealthy diet, or you drink too much alcohol or use tobacco products. Follow-up care is a key part of your treatment and safety. Be sure to make and go to all appointments, and call your doctor if you are having problems. It's also a good idea to know your test results and keep a list of the medicines you take. How can you care for yourself at home? · Practice healthy eating habits. This includes eating plenty of fruits, vegetables, whole grains, lean protein, and low-fat dairy. · If your doctor recommends it, get more exercise. Walking is a good choice. Bit by bit, increase the amount you walk every day. Try for at least 30 minutes on most days of the week. · Do not smoke. Smoking can increase your risk for health problems. If you need help quitting, talk to your doctor about stop-smoking programs and medicines. These can increase your chances of quitting for good. · Limit alcohol to 2 drinks a day for men and 1 drink a day for women. Too much alcohol can cause health problems. If you have a BMI higher than 25  · Your doctor may do other tests to check your risk for weight-related health problems. This may include measuring the distance around your waist. A waist measurement of more than 40 inches in men or 35 inches in women can increase the risk of weight-related health problems. · Talk with your doctor about steps you can take to stay healthy or improve your health. You may need to make lifestyle changes to lose weight and stay healthy, such as changing your diet and getting regular exercise. If you have a BMI lower than 18.5  · Your doctor may do other tests to check your risk for health problems. · Talk with your doctor about steps you can take to stay healthy or improve your health. You may need to make lifestyle changes to gain or maintain weight and stay healthy, such as getting more healthy foods in your diet and doing exercises to build muscle. Where can you learn more? Go to http://vance-melinda.info/. Enter S176 in the search box to learn more about \"Body Mass Index: Care Instructions. \"  Current as of: October 13, 2016  Content Version: 11.4  © 8453-9344 Healthwise, Incorporated. Care instructions adapted under license by [employer] (which disclaims liability or warranty for this information). If you have questions about a medical condition or this instruction, always ask your healthcare professional. [place] [address] any warranty or liability for your use of this information. Hand Arthritis: Exercises  Your Care Instructions  Here are some examples of exercises for hand arthritis. Start each exercise slowly. Ease off the exercise if you start to have pain.   Your doctor or your physical or occupational therapist will tell you when you can start these exercises and which ones will work best for you. How to do the exercises  Tendon antony    1. In this exercise, the steps follow one another to a make a continuous movement. 2. With your affected hand, point your fingers and thumb straight up. Your wrist should be relaxed, following the line of your fingers and thumb. 3. Curl your fingers so that the top two joints in them are bent, and your fingers wrap down. Your fingertips should touch or be near the base of your fingers. Your fingers will look like a hook. 4. Make a fist by bending your knuckles. Your thumb can gently rest against your index (pointing) finger. 5. Unwind your fingers slightly so that your fingertips can touch the base of your palm. Your thumb can rest against your index finger. 6. Move back to your starting position, with your fingers and thumb pointing up. 7. Repeat the series of motions 8 to 12 times. 8. Switch hands and repeat steps 1 through 6, even if only one hand is sore. Intrinsic flexion    1. Rest your affected hand on a table and bend the large joints where your fingers connect to your hand. Keep your thumb and the other joints in your fingers straight. 2. Slowly straighten your fingers. Your wrist should be relaxed, following the line of your fingers and thumb. 3. Move back to your starting position, with your hand bent. 4. Repeat 8 to 12 times. 5. Switch hands and repeat steps 1 through 4, even if only one hand is sore. Finger extension    1. Place your affected hand flat on a table. 2. Lift and then lower one finger at a time off the table. 3. Repeat 8 to 12 times. 4. Switch hands and repeat steps 1 through 3, even if only one hand is sore. MP extension    1. Place your good hand on a table, palm up.  Put your affected hand on top of your good hand with your fingers wrapped around the thumb of your good hand like you are making a fist.  2. Slowly uncurl the joints of your affected hand where your fingers connect to your hand so that only the top two joints of your fingers are bent. Your fingers will look like a hook. 3. Move back to your starting position, with your fingers wrapped around your good thumb. 4. Repeat 8 to 12 times. 5. Switch hands and repeat steps 1 through 4, even if only one hand is sore. PIP extension (with MP extension)    1. Place your good hand on a table, palm up. Put your affected hand on top of your good hand, palm up. 2. Use the thumb and fingers of your good hand to grasp below the middle joint of one finger of your affected hand. 3. Straighten the last two joints of that finger. 4. Repeat 8 to 12 times. 5. Repeat steps 1 through 4 with each finger. 6. Switch hands and repeat steps 1 through 5, even if only one hand is sore. DIP flexion    1. With your good hand, grasp one finger of your affected hand. Your thumb will be on the top side of your finger just below the joint that is closest to your fingernail. 2. Slowly bend your affected finger only at the joint closest to your fingernail. 3. Repeat 8 to 12 times. 4. Repeat steps 1 through 3 with each finger. 5. Switch hands and repeat steps 1 through 4, even if only one hand is sore. Follow-up care is a key part of your treatment and safety. Be sure to make and go to all appointments, and call your doctor if you are having problems. It's also a good idea to know your test results and keep a list of the medicines you take. Where can you learn more? Go to http://vance-melinda.info/. Enter T665 in the search box to learn more about \"Hand Arthritis: Exercises. \"  Current as of: March 21, 2017  Content Version: 11.4  © 9901-9274 Healthwise, Incorporated. Care instructions adapted under license by [employer] (which disclaims liability or warranty for this information).  If you have questions about a medical condition or this instruction, always ask your healthcare professional. [address] any warranty or liability for your use of this information.

## 2018-03-05 NOTE — PROGRESS NOTES
Discussed the patient's BMI with her. The BMI follow up plan is as follows:     dietary management education, guidance, and counseling  encourage exercise  monitor weight  prescribed dietary intake    An After Visit Summary was printed and given to the patient. Subjective: Kenya Simms is a 65 y.o. female seen for follow up of diabetes. She also has hyperlipidemia and obesity. Diabetic Review of Systems - medication compliance: compliant most of the time, diabetic diet compliance: compliant most of the time, home glucose monitoring: is performed regularly, fasting values range 105, last eye exam approximately 1 month ago @ [facility]. Other symptoms and concerns: due labs. She had mammogram done at [facility]. Patient Active Problem List    Diagnosis Date Noted    Diabetes ([address] 12/10/2010    Hypercholesterolemia 12/10/2010    Hypothyroidism 12/10/2010    HX: breast cancer 12/10/2010    Environmental allergies 12/10/2010    Arthritis 12/10/2010    Sleep apnea 12/10/2010    Fatty liver 12/10/2010     Current Outpatient Prescriptions   Medication Sig Dispense Refill    Insulin Needles, Disposable, (LACY PEN NEEDLE) 32 gauge x 5/32\" ndle Use for Victoza pen one daily. 30 Pen Needle 4    metFORMIN ER (GLUCOPHAGE XR) 500 mg tablet TAKE 2 TABLETS IN THE MORNING AND 2 TABLETS IN THE EVENING 360 Tab 3    venlafaxine-SR (EFFEXOR-XR) 75 mg capsule TAKE 1 CAPSULE DAILY 90 Cap 3    WELCHOL 625 mg tablet TAKE 3 TABLETS TWICE A DAY WITH MEALS 540 Tab 3    levothyroxine (SYNTHROID) 150 mcg tablet Take 1 Tab by mouth Daily (before breakfast). Indications: hypothyroidism 90 Tab 1    cholecalciferol, vitamin D3, (VITAMIN D3) 4,000 unit cap Take 4,000 Units by mouth daily. 90 Cap 5    Liraglutide (VICTOZA 3-ELVIS) 0.6 mg/0.1 mL (18 mg/3 mL) sub-q pen INJECT 1.8 MG UNDER THE SKIN ONCE A DAY 27 mL 1    ezetimibe (ZETIA) 10 mg tablet Take 1 Tab by mouth daily.  Indications: hypercholesterolemia 90 Tab 1    diclofenac EC (VOLTAREN) 75 mg EC tablet TAKE 1 TABLET TWICE A DAY AS NEEDED 180 Tab 1    lisinopril (PRINIVIL, ZESTRIL) 10 mg tablet Take 0.5 Tabs by mouth daily. 90 Tab 3    ferrous sulfate 325 mg (65 mg iron) tablet Take  by mouth Daily (before breakfast).  fluticasone (FLONASE) 50 mcg/actuation nasal spray USE 2 SPRAYS TO BOTH NOSTRIL ONCE DAILY 16 g 5    milk thistle 500 mg cap Take 500 mg by mouth daily. 60 Cap 0    glucose blood VI test strips (ONE TOUCH ULTRA TEST) strip Use as directed 1 Package 11    aspirin delayed-release 81 mg tablet Take  by mouth daily.  Omeprazole delayed release (PRILOSEC D/R) 20 mg tablet Take 1 Tab by mouth daily. 30 Tab 5    cetirizine (ZYRTEC) 10 mg tablet Take 10 mg by mouth daily.  MULTIVITAMIN PO Take  by mouth.  cyanocobalamin (VITAMIN B-12) 2,500 mcg Subl Take 2,500 mg by mouth daily.  CALCIUM CARBONATE/VITAMIN D3 (CALCIUM 600 + D,3, PO) Take  by mouth.  magnesium 250 mg Tab Take  by mouth.  OMEGA-3 FATTY ACIDS (OMEGA 3 PO) Take 4 Tabs by mouth daily.  traMADol (ULTRAM) 50 mg tablet Take 1 Tab by mouth every eight (8) hours as needed for Pain. Max Daily Amount: 150 mg. 180 Tab 1    carisoprodol (SOMA) 250 mg tablet TAKE 1 TABLET BY MOUTH 4 TIMES A DAY AS NEEDED FOR MUSCLE SPASM 90 Tab 1    albuterol (VENTOLIN HFA) 90 mcg/actuation inhaler Take 2 Puffs by inhalation every four (4) hours as needed for Wheezing. 18 Inhaler 1    hydrocortisone (ANUSOL-HC) 25 mg supp Insert 1 Suppository into rectum every twelve (12) hours as needed. Indications: HEMORRHOIDS 12 Suppository 1    mometasone (NASONEX) 50 mcg/actuation nasal spray 2 Sprays daily.  clotrimazole-betamethasone (LOTRISONE) topical cream Apply to rash twice a day.  45 g 3     Allergies   Allergen Reactions    Crestor [Rosuvastatin] Other (comments)     Increased CK    Simvastatin Myalgia     Past Medical History:   Diagnosis Date  Arthritis 12/10/2010    Breast [facility]     removed 2/2/09    Diabetes ([facility] [address] [facility]     Fatty liver 12/10/2010    Hypercholesterolemia     Sleep apnea 12/10/2010    Thyroid disease      Past Surgical History:   Procedure Laterality Date    ENDOSCOPY, COLON, DIAGNOSTIC  1/2009    HX BREAST LUMPECTOMY  2/2/09    reincision    HX CATARACT REMOVAL Right 11/2016    HX CATARACT REMOVAL Left 11/2016    HX GYN  2010    cervical polypectomy    HX HEENT      HX LITHOTRIPSY  1994    HX ORTHOPAEDIC      foot    HX TONSIL AND ADENOIDECTOMY  1960     Family History   Problem Relation Age of Onset    Cancer Mother     COPD Mother     Cancer Father      mesothelioma    Arthritis-rheumatoid Sister      Social History   Substance Use Topics    Smoking status: Never Smoker    Smokeless tobacco: Never Used    Alcohol use No             Review of Systems  Musculoskeletal:positive for arthralgias    Objective:     Visit Vitals    /60 (BP 1 Location: Left arm, BP Patient Position: Sitting)  Comment: manual    Pulse 63    Temp 97.6 °F (36.4 °C) (Oral)    Resp 20    Ht 5' 3\" (1.6 m)    Wt 193 lb 12.8 oz (87.9 kg)    SpO2 98%    BMI 34.33 kg/m2     Appearance: alert, well appearing, and in no distress and overweight. Exam: heart sounds normal rate, regular rhythm, normal S1, S2, no murmurs, rubs, clicks or gallops, chest clear  Lab review: orders written for new lab studies as appropriate; see orders. Assessment/Plan:     diabetes , hyperlipidemia . Diabetic issues reviewed with her: glycohemoglobin and other lab monitoring discussed. ICD-10-CM ICD-9-CM    1.  Type 2 diabetes mellitus without complication, without long-term current use of insulin ([facility]) E11.9 250.00 Insulin Needles, Disposable, (LACY PEN NEEDLE) 32 gauge x 5/32\" ndle      HEMOGLOBIN A1C WITH EAG      DISCONTINUED: Insulin Needles, Disposable, (LACY PEN NEEDLE) 32 gauge x 5/32\" ndle      DISCONTINUED: Insulin Needles, Disposable, (LACY PEN NEEDLE) 32 gauge x 5/32\" ndle   2. Hypercholesterolemia E78.00 272.0 LIPID PANEL   3. Hypothyroidism due to acquired atrophy of thyroid E03.4 244.8 T4, FREE     246.8 TSH 3RD GENERATION   4. Encounter for long-term current use of medication Z79.899 V58.69 CBC W/O DIFF      METABOLIC PANEL, COMPREHENSIVE   5. Vitamin D deficiency E55.9 268.9 VITAMIN D, 25 HYDROXY   6.  Arthritis M19.90 716.90

## 2018-03-06 LAB
25(OH)D3+25(OH)D2 SERPL-MCNC: 36.3 NG/ML (ref 30–100)
ALBUMIN SERPL-MCNC: 4.5 G/DL (ref 3.6–4.8)
ALBUMIN/GLOB SERPL: 1.7 {RATIO} (ref 1.2–2.2)
ALP SERPL-CCNC: 70 IU/L (ref 39–117)
ALT SERPL-CCNC: 76 IU/L (ref 0–32)
AST SERPL-CCNC: 63 IU/L (ref 0–40)
BILIRUB SERPL-MCNC: 0.3 MG/DL (ref 0–1.2)
BUN SERPL-MCNC: 9 MG/DL (ref 8–27)
BUN/CREAT SERPL: 12 (ref 12–28)
CALCIUM SERPL-MCNC: 10.3 MG/DL (ref 8.7–10.3)
CHLORIDE SERPL-SCNC: 98 MMOL/L (ref 96–106)
CHOLEST SERPL-MCNC: 206 MG/DL (ref 100–199)
CO2 SERPL-SCNC: 26 MMOL/L (ref 18–29)
CREAT SERPL-MCNC: 0.73 MG/DL (ref 0.57–1)
ERYTHROCYTE [DISTWIDTH] IN BLOOD BY AUTOMATED COUNT: 14 % (ref 12.3–15.4)
EST. AVERAGE GLUCOSE BLD GHB EST-MCNC: 126 MG/DL
GFR SERPLBLD CREATININE-BSD FMLA CKD-EPI: 103 ML/MIN/1.73
GFR SERPLBLD CREATININE-BSD FMLA CKD-EPI: 89 ML/MIN/1.73
GLOBULIN SER CALC-MCNC: 2.7 G/DL (ref 1.5–4.5)
GLUCOSE SERPL-MCNC: 94 MG/DL (ref 65–99)
HBA1C MFR BLD: 6 % (ref 4.8–5.6)
HCT VFR BLD AUTO: 41 % (ref 34–46.6)
HDLC SERPL-MCNC: 50 MG/DL
HGB BLD-MCNC: 13.3 G/DL (ref 11.1–15.9)
INTERPRETATION, 910389: NORMAL
LDLC SERPL CALC-MCNC: 89 MG/DL (ref 0–99)
Lab: NORMAL
MCH RBC QN AUTO: 29.2 PG (ref 26.6–33)
MCHC RBC AUTO-ENTMCNC: 32.4 G/DL (ref 31.5–35.7)
MCV RBC AUTO: 90 FL (ref 79–97)
PLATELET # BLD AUTO: 259 X10E3/UL (ref 150–379)
POTASSIUM SERPL-SCNC: 4.9 MMOL/L (ref 3.5–5.2)
PROT SERPL-MCNC: 7.2 G/DL (ref 6–8.5)
RBC # BLD AUTO: 4.56 X10E6/UL (ref 3.77–5.28)
SODIUM SERPL-SCNC: 138 MMOL/L (ref 134–144)
T4 FREE SERPL-MCNC: 1.18 NG/DL (ref 0.82–1.77)
TRIGL SERPL-MCNC: 335 MG/DL (ref 0–149)
TSH SERPL DL<=0.005 MIU/L-ACNC: 3.39 UIU/ML (ref 0.45–4.5)
VLDLC SERPL CALC-MCNC: 67 MG/DL (ref 5–40)
WBC # BLD AUTO: 6.2 X10E3/UL (ref 3.4–10.8)

## 2018-03-07 NOTE — PROGRESS NOTES
Labs show increase in liver function tests. Cholesterol numbers are stable. Triglycerides remain high. Good thyroid level. Vit D is at desired level. A1C level stable. Try to limit sugar and starches. Also limit amount of highly processed food in diet.

## 2018-03-12 DIAGNOSIS — E78.00 HYPERCHOLESTEROLEMIA: ICD-10-CM

## 2018-03-13 RX ORDER — EZETIMIBE 10 MG/1
TABLET ORAL
Qty: 90 TAB | Refills: 1 | Status: SHIPPED | OUTPATIENT
Start: 2018-03-13 | End: 2018-08-24 | Stop reason: SDUPTHER

## 2018-03-14 DIAGNOSIS — E11.9 TYPE 2 DIABETES MELLITUS WITHOUT COMPLICATION, WITHOUT LONG-TERM CURRENT USE OF INSULIN (HCC): ICD-10-CM

## 2018-03-14 RX ORDER — LIRAGLUTIDE 6 MG/ML
INJECTION SUBCUTANEOUS
Qty: 27 ML | Refills: 1 | Status: SHIPPED | OUTPATIENT
Start: 2018-03-14 | End: 2018-09-10 | Stop reason: SDUPTHER

## 2018-04-18 RX ORDER — LEVOTHYROXINE SODIUM 150 UG/1
TABLET ORAL
Qty: 90 TAB | Refills: 1 | Status: SHIPPED | OUTPATIENT
Start: 2018-04-18 | End: 2018-09-21 | Stop reason: DRUGHIGH

## 2018-05-30 DIAGNOSIS — M19.90 ARTHRITIS: ICD-10-CM

## 2018-05-30 RX ORDER — TRAMADOL HYDROCHLORIDE 50 MG/1
50 TABLET ORAL
Qty: 180 TAB | Refills: 1 | Status: SHIPPED | OUTPATIENT
Start: 2018-05-30 | End: 2019-12-11 | Stop reason: SDUPTHER

## 2018-05-30 NOTE — TELEPHONE ENCOUNTER
From: Autumn Bryant  To:  Earnestine Maguire MD  Sent: 5/30/2018 10:13 AM EDT  Subject: Medication Renewal Request    Original authorizing provider: MD Autumn Gomez would like a refill of the following medications:  traMADol (ULTRAM) 50 mg tablet Earnestine Maguire MD]    Preferred pharmacy: [address] [place] [address]:

## 2018-08-24 DIAGNOSIS — E78.00 HYPERCHOLESTEROLEMIA: ICD-10-CM

## 2018-08-24 RX ORDER — EZETIMIBE 10 MG/1
TABLET ORAL
Qty: 90 TAB | Refills: 1 | Status: SHIPPED | OUTPATIENT
Start: 2018-08-24 | End: 2019-02-20 | Stop reason: SDUPTHER

## 2018-09-10 DIAGNOSIS — E11.9 TYPE 2 DIABETES MELLITUS WITHOUT COMPLICATION, WITHOUT LONG-TERM CURRENT USE OF INSULIN (HCC): ICD-10-CM

## 2018-09-10 RX ORDER — LIRAGLUTIDE 6 MG/ML
INJECTION SUBCUTANEOUS
Qty: 27 ML | Refills: 1 | Status: SHIPPED | OUTPATIENT
Start: 2018-09-10 | End: 2019-03-09 | Stop reason: SDUPTHER

## 2018-09-19 ENCOUNTER — OFFICE VISIT (OUTPATIENT)
Dept: FAMILY MEDICINE CLINIC | Age: 63
End: 2018-09-19

## 2018-09-19 VITALS
RESPIRATION RATE: 20 BRPM | WEIGHT: 190 LBS | BODY MASS INDEX: 33.66 KG/M2 | SYSTOLIC BLOOD PRESSURE: 110 MMHG | HEIGHT: 63 IN | DIASTOLIC BLOOD PRESSURE: 70 MMHG | HEART RATE: 82 BPM | TEMPERATURE: 98.2 F | OXYGEN SATURATION: 99 %

## 2018-09-19 DIAGNOSIS — E78.00 HYPERCHOLESTEROLEMIA: ICD-10-CM

## 2018-09-19 DIAGNOSIS — Z79.899 ENCOUNTER FOR LONG-TERM CURRENT USE OF MEDICATION: ICD-10-CM

## 2018-09-19 DIAGNOSIS — M19.049 HAND ARTHRITIS: ICD-10-CM

## 2018-09-19 DIAGNOSIS — E11.9 TYPE 2 DIABETES MELLITUS WITHOUT COMPLICATION, WITHOUT LONG-TERM CURRENT USE OF INSULIN (HCC): Primary | ICD-10-CM

## 2018-09-19 DIAGNOSIS — E03.9 ACQUIRED HYPOTHYROIDISM: ICD-10-CM

## 2018-09-19 LAB
ALBUMIN UR QL STRIP: 10 MG/L
CREATININE, URINE POC: 200 MG/DL
MICROALBUMIN/CREAT RATIO POC: <30 MG/G

## 2018-09-19 RX ORDER — TERBINAFINE HYDROCHLORIDE 250 MG/1
250 TABLET ORAL EVERY OTHER DAY
COMMUNITY
Start: 2018-08-21 | End: 2018-12-21

## 2018-09-19 NOTE — PATIENT INSTRUCTIONS
Cholesterol and Triglycerides Tests: About These Tests  What are they? Cholesterol and triglycerides tests measure the amount of fats in your blood. These fats have both \"good\" (HDL) and \"bad\" (LDL) cholesterol. Why are these tests done? These tests are done to help find out your risk of a heart attack and stroke. They can help your doctor find out how well medicine is working for some health problems. How can you prepare for these tests? · Your doctor may tell you to fast before your tests. This means that you do not eat or drink anything except water for 9 to 12 hours before the tests. In most cases, you can take your medicines with water the morning of the test.  · Do not eat high-fat foods the night before the tests. · Do not drink alcohol or do intense exercise the night before the tests. · Be sure to tell your doctor about all the over-the-counter and prescription medicines and herbs or other supplements you take. They can affect the results of these tests. What happens during these tests? A health professional takes a sample of your blood. What else should you know about these tests? Your cholesterol levels can help your doctor find out your risk for having a heart attack or stroke. But it's not just about your cholesterol. Your doctor uses your cholesterol levels plus other things to calculate your risk. These include:  · Your blood pressure. · Whether or not you have diabetes. · Your age, sex, and race. · Whether or not you smoke. You and your doctor can talk about whether you need to lower your risk and what treatment is best for you. Where can you learn more? Go to http://vance-melinda.info/. Enter M069 in the search box to learn more about \"Cholesterol and Triglycerides Tests: About These Tests. \"  Current as of: May 10, 2017  Content Version: 11.7  © 5931-8107 [employer].  Care instructions adapted under license by [employer] (which disclaims liability or warranty for this information). If you have questions about a medical condition or this instruction, always ask your healthcare professional. [address] any warranty or liability for your use of this information. Body Mass Index: Care Instructions  Your Care Instructions    Body mass index (BMI) can help you see if your weight is raising your risk for health problems. It uses a formula to compare how much you weigh with how tall you are. · A BMI lower than 18.5 is considered underweight. · A BMI between 18.5 and 24.9 is considered healthy. · A BMI between 25 and 29.9 is considered overweight. A BMI of 30 or higher is considered obese. If your BMI is in the normal range, it means that you have a lower risk for weight-related health problems. If your BMI is in the overweight or obese range, you may be at increased risk for weight-related health problems, such as high blood pressure, heart disease, stroke, arthritis or joint pain, and diabetes. If your BMI is in the underweight range, you may be at increased risk for health problems such as fatigue, lower protection (immunity) against illness, muscle loss, bone loss, hair loss, and hormone problems. BMI is just one measure of your risk for weight-related health problems. You may be at higher risk for health problems if you are not active, you eat an unhealthy diet, or you drink too much alcohol or use tobacco products. Follow-up care is a key part of your treatment and safety. Be sure to make and go to all appointments, and call your doctor if you are having problems. It's also a good idea to know your test results and keep a list of the medicines you take. How can you care for yourself at home? · Practice healthy eating habits. This includes eating plenty of fruits, vegetables, whole grains, lean protein, and low-fat dairy. · If your doctor recommends it, get more exercise. Walking is a good choice.  Bit by bit, increase the amount you walk every day. Try for at least 30 minutes on most days of the week. · Do not smoke. Smoking can increase your risk for health problems. If you need help quitting, talk to your doctor about stop-smoking programs and medicines. These can increase your chances of quitting for good. · Limit alcohol to 2 drinks a day for men and 1 drink a day for women. Too much alcohol can cause health problems. If you have a BMI higher than 25  · Your doctor may do other tests to check your risk for weight-related health problems. This may include measuring the distance around your waist. A waist measurement of more than 40 inches in men or 35 inches in women can increase the risk of weight-related health problems. · Talk with your doctor about steps you can take to stay healthy or improve your health. You may need to make lifestyle changes to lose weight and stay healthy, such as changing your diet and getting regular exercise. If you have a BMI lower than 18.5  · Your doctor may do other tests to check your risk for health problems. · Talk with your doctor about steps you can take to stay healthy or improve your health. You may need to make lifestyle changes to gain or maintain weight and stay healthy, such as getting more healthy foods in your diet and doing exercises to build muscle. Where can you learn more? Go to http://vance-melinda.info/. Enter S176 in the search box to learn more about \"Body Mass Index: Care Instructions. \"  Current as of: October 13, 2016  Content Version: 11.4  © 6139-7080 [employer]. Care instructions adapted under license by [employer] (which disclaims liability or warranty for this information). If you have questions about a medical condition or this instruction, always ask your healthcare professional. [place] [address] any warranty or liability for your use of this information.

## 2018-09-19 NOTE — MR AVS SNAPSHOT
[address] [place] [address] 
 
 
 [facility] [address] [facility] 
[phone] Patient: Jus Lerner MRN: [id number] :1955 Visit Information Date & Time Provider Department Dept. Phone Encounter #  
 1960  3:08 AM Boyd RodríguezAlen [phone] [id number] Upcoming Health Maintenance Date Due Influenza Age 5 to Adult 2018 DTaP/Tdap/Td series (2 - Td) 2018 LIPID PANEL Q1 3/5/2019 EYE EXAM RETINAL OR DILATED Q1 3/12/2019 HEMOGLOBIN A1C Q6M 3/19/2019 PAP AKA CERVICAL CYTOLOGY 3/24/2019 FOOT EXAM Q1 2019 MICROALBUMIN Q1 2019 BREAST CANCER SCRN MAMMOGRAM 3/7/2020 COLONOSCOPY 10/23/2025 Allergies as of 2018  Review Complete On: 21 By: Boyd Rodríguez MD  
  
 Severity Noted Reaction Type Reactions Crestor [Rosuvastatin]  2011   Side Effect Other (comments) Increased CK Simvastatin  2011    Myalgia Current Immunizations  Reviewed on 10/2/2017 Name Date Influenza Vaccine 2017, 10/25/2016, 10/21/2014, 10/8/2012 Influenza Vaccine (Quad) PF 2017 12:00 AM  
 Influenza Vaccine Split 10/10/2011 Pneumococcal Conjugate (PCV-13) 2017 12:00 AM  
 TDAP Vaccine 2008 ZZZ-RETIRED (DO NOT USE) Pneumococcal Vaccine (Unspecified Type) 10/10/2011 Zoster Vaccine, Live 2015 Not reviewed this visit You Were Diagnosed With   
  
 Codes Comments Type 2 diabetes mellitus without complication, without long-term current use of insulin (HCC)    -  Primary ICD-10-CM: E11.9 ICD-9-CM: 250.00 Hypercholesterolemia     ICD-10-CM: E78.00 ICD-9-CM: 272.0 Acquired hypothyroidism     ICD-10-CM: E03.9 ICD-9-CM: 244.9 Hand arthritis     ICD-10-CM: M19.049 ICD-9-CM: 716.94 Encounter for long-term current use of medication     ICD-10-CM: Z79.899 ICD-9-CM: V58.69 Vitals BP Pulse Temp Resp Height(growth percentile) Weight(growth percentile) 110/70 (BP 1 Location: Left arm, BP Patient Position: Sitting) 82 98.2 °F (36.8 °C) (Oral) 20 5' 3\" (1.6 m) 190 lb (86.2 kg) SpO2 BMI OB Status Smoking Status 99% 33.66 kg/m2 Postmenopausal Never Smoker Vitals History BMI and BSA Data Body Mass Index Body Surface Area  
 33.66 kg/m 2 1.96 m 2 Preferred Pharmacy Pharmacy Name Phone Sarkis Long, [facility] [phone] Your Updated Medication List  
  
   
This list is accurate as of 9/19/18 10:18 AM.  Always use your most recent med list.  
  
  
  
  
 albuterol 90 mcg/actuation inhaler Commonly known as:  VENTOLIN HFA Take 2 Puffs by inhalation every four (4) hours as needed for Wheezing. aspirin delayed-release 81 mg tablet Take  by mouth daily. CALCIUM 600 + D(3) PO Take  by mouth.  
  
 carisoprodol 250 mg tablet Commonly known as:  SOMA TAKE 1 TABLET BY MOUTH 4 TIMES A DAY AS NEEDED FOR MUSCLE SPASM  
  
 cholecalciferol (vitamin D3) 4,000 unit Cap Commonly known as:  VITAMIN D3 Take 4,000 Units by mouth daily. clotrimazole-betamethasone topical cream  
Commonly known as:  Renaye Im Apply to rash twice a day. diclofenac EC 75 mg EC tablet Commonly known as:  VOLTAREN  
TAKE 1 TABLET TWICE A DAY AS NEEDED  
  
 ezetimibe 10 mg tablet Commonly known as:  Sondheimer Finely TAKE 1 TABLET DAILY FOR HYPERCHOLESTEROLEMIA  
  
 ferrous sulfate 325 mg (65 mg iron) tablet Take  by mouth Daily (before breakfast). fluticasone 50 mcg/actuation nasal spray Commonly known as:  FLONASE  
USE 2 SPRAYS TO BOTH NOSTRIL ONCE DAILY  
  
 glucose blood VI test strips strip Commonly known as:  ONETOUCH ULTRA TEST Use as directed Insulin Needles (Disposable) 32 gauge x 5/32\" Ndle Commonly known as:  Mleony Pen Needle Use for Victoza pen one daily. levothyroxine 150 mcg tablet Commonly known as:  SYNTHROID  
TAKE 1 TABLET DAILY BEFORE BREAKFAST FOR HYPOTHYROIDISM (CHANGE IN DOSE) lisinopril 10 mg tablet Commonly known as:  Galdino Economy Take 0.5 Tabs by mouth daily. magnesium 250 mg Tab Take  by mouth.  
  
 metFORMIN  mg tablet Commonly known as:  GLUCOPHAGE XR  
TAKE 2 TABLETS IN THE MORNING AND 2 TABLETS IN THE EVENING  
  
 milk thistle 500 mg Cap Take 500 mg by mouth daily. MULTIVITAMIN PO Take  by mouth. NASONEX 50 mcg/actuation nasal spray Generic drug:  mometasone 2 Sprays daily. OMEGA 3 PO Take 4 Tabs by mouth daily. Omeprazole delayed release 20 mg tablet Commonly known as:  PRILOSEC D/R Take 1 Tab by mouth daily. terbinafine HCl 250 mg tablet Commonly known as:  LAMISIL Take 250 mg by mouth every other day. traMADol 50 mg tablet Commonly known as:  ULTRAM  
Take 1 Tab by mouth every eight (8) hours as needed for Pain. Max Daily Amount: 150 mg.  
  
 venlafaxine-SR 75 mg capsule Commonly known as:  EFFEXOR-XR  
TAKE 1 CAPSULE DAILY  
  
 VICTOZA 3-ELVIS 0.6 mg/0.1 mL (18 mg/3 mL) Pnij Generic drug:  Liraglutide INJECT 1.8 MG UNDER THE SKIN ONCE DAILY  
  
 VITAMIN B-12 2,500 mcg sublingual tablet Generic drug:  cyanocobalamin Take 2,500 mg by mouth daily. WELCHOL 625 mg tablet Generic drug:  colesevelam  
TAKE 3 TABLETS TWICE A DAY WITH MEALS ZyrTEC 10 mg tablet Generic drug:  cetirizine Take 10 mg by mouth daily. We Performed the Following AMB POC URINE, MICROALBUMIN, SEMIQUANT (3 RESULTS) [01838 CPT(R)] CBC WITH AUTOMATED DIFF [28815 CPT(R)] HEMOGLOBIN A1C WITH EAG [08882 CPT(R)]  DIABETES FOOT EXAM [HM7 Custom] LIPID PANEL [45949 CPT(R)] METABOLIC PANEL, COMPREHENSIVE [35368 CPT(R)] REFERRAL TO ORTHOPEDIC SURGERY [REF62 Custom] Comments:  
 Please evaluate patient for hand arthritis. T4, FREE Q2248341 CPT(R)] TSH 3RD GENERATION [65045 CPT(R)] Referral Information Referral ID Referred By Referred To  
  
 [id number] Daren BOBBY, [address] [facility]. P[address] Andrew Robison Phone: [phone] Fax: [phone] Visits Status Start Date End Date 1 New Request 9/19/18 9/19/19 If your referral has a status of pending review or denied, additional information will be sent to support the outcome of this decision. Patient Instructions Cholesterol and Triglycerides Tests: About These Tests What are they? Cholesterol and triglycerides tests measure the amount of fats in your blood. These fats have both \"good\" (HDL) and \"bad\" (LDL) cholesterol. Why are these tests done? These tests are done to help find out your risk of a heart attack and stroke. They can help your doctor find out how well medicine is working for some health problems. How can you prepare for these tests? · Your doctor may tell you to fast before your tests. This means that you do not eat or drink anything except water for 9 to 12 hours before the tests. In most cases, you can take your medicines with water the morning of the test. 
· Do not eat high-fat foods the night before the tests. · Do not drink alcohol or do intense exercise the night before the tests. · Be sure to tell your doctor about all the over-the-counter and prescription medicines and herbs or other supplements you take. They can affect the results of these tests. What happens during these tests? A health professional takes a sample of your blood. What else should you know about these tests? Your cholesterol levels can help your doctor find out your risk for having a heart attack or stroke. But it's not just about your cholesterol. Your doctor uses your cholesterol levels plus other things to calculate your risk. These include: 
· Your blood pressure. · Whether or not you have diabetes. · Your age, sex, and race. · Whether or not you smoke. You and your doctor can talk about whether you need to lower your risk and what treatment is best for you. Where can you learn more? Go to http://vance-melinda.info/. Enter L016 in the search box to learn more about \"Cholesterol and Triglycerides Tests: About These Tests. \" Current as of: May 10, 2017 Content Version: 11.7 © 3705-3169 Healthwise, Incorporated. Care instructions adapted under license by [employer] (which disclaims liability or warranty for this information). If you have questions about a medical condition or this instruction, always ask your healthcare professional. [address] any warranty or liability for your use of this information. Introducing [facility] & HEALTH SERVICES! Dear Delos Space: Thank you for requesting a [employer] account. Our records indicate that you already have an active [employer] account. You can access your account anytime at [email or web address] Did you know that you can access your hospital and ER discharge instructions at any time in [employer]? You can also review all of your test results from your hospital stay or ER visit. Additional Information If you have questions, please visit the Frequently Asked Questions section of the [employer] website at [email or web address] Remember, [employer] is NOT to be used for urgent needs. For medical emergencies, dial 911. Now available from your iPhone and Android! Please provide this summary of care documentation to your next provider. Your primary care clinician is listed as Corie Moreau. If you have any questions after today's visit, please call [phone].

## 2018-09-19 NOTE — PROGRESS NOTES
Subjective: Archana Marquis is a 65 y.o. female seen for follow up of diabetes. She also has hypertension, hyperlipidemia and obesity. Diabetic Review of Systems - medication compliance: compliant most of the time, diabetic diet compliance: compliant most of the time, home glucose monitoring: is performed sporadically. Other symptoms and concerns: she saw Podiatry last month - Dr. Veronica Hoffman. C/O tender nodules on fingers from arthritis. Struggles to lose weight. Patient Active Problem List    Diagnosis Date Noted    Diabetes ([facility] [address] 12/10/2010    Hypercholesterolemia 12/10/2010    Hypothyroidism 12/10/2010    HX: breast cancer 12/10/2010    Environmental allergies 12/10/2010    Arthritis 12/10/2010    Sleep apnea 12/10/2010    Fatty liver 12/10/2010     Current Outpatient Prescriptions   Medication Sig Dispense Refill    terbinafine HCl (LAMISIL) 250 mg tablet Take 250 mg by mouth every other day.  VICTOZA 3-ELVIS 0.6 mg/0.1 mL (18 mg/3 mL) pnij INJECT 1.8 MG UNDER THE SKIN ONCE DAILY 27 mL 1    ezetimibe (ZETIA) 10 mg tablet TAKE 1 TABLET DAILY FOR HYPERCHOLESTEROLEMIA 90 Tab 1    lisinopril (PRINIVIL, ZESTRIL) 10 mg tablet Take 0.5 Tabs by mouth daily. 90 Tab 3    levothyroxine (SYNTHROID) 150 mcg tablet TAKE 1 TABLET DAILY BEFORE BREAKFAST FOR HYPOTHYROIDISM (CHANGE IN DOSE) 90 Tab 1    Insulin Needles, Disposable, (LACY PEN NEEDLE) 32 gauge x 5/32\" ndle Use for Victoza pen one daily. 30 Pen Needle 4    metFORMIN ER (GLUCOPHAGE XR) 500 mg tablet TAKE 2 TABLETS IN THE MORNING AND 2 TABLETS IN THE EVENING 360 Tab 3    venlafaxine-SR (EFFEXOR-XR) 75 mg capsule TAKE 1 CAPSULE DAILY 90 Cap 3    WELCHOL 625 mg tablet TAKE 3 TABLETS TWICE A DAY WITH MEALS 540 Tab 3    cholecalciferol, vitamin D3, (VITAMIN D3) 4,000 unit cap Take 4,000 Units by mouth daily.  (Patient taking differently: Take 5,000 Units by mouth daily.) 90 Cap 5    mometasone (NASONEX) 50 mcg/actuation nasal spray 2 Sprays daily.  ferrous sulfate 325 mg (65 mg iron) tablet Take  by mouth Daily (before breakfast).  fluticasone (FLONASE) 50 mcg/actuation nasal spray USE 2 SPRAYS TO BOTH NOSTRIL ONCE DAILY 16 g 5    milk thistle 500 mg cap Take 500 mg by mouth daily. 60 Cap 0    glucose blood VI test strips (ONE TOUCH ULTRA TEST) strip Use as directed 1 Package 11    aspirin delayed-release 81 mg tablet Take  by mouth daily.  Omeprazole delayed release (PRILOSEC D/R) 20 mg tablet Take 1 Tab by mouth daily. 30 Tab 5    cetirizine (ZYRTEC) 10 mg tablet Take 10 mg by mouth daily.  MULTIVITAMIN PO Take  by mouth.  cyanocobalamin (VITAMIN B-12) 2,500 mcg Subl Take 2,500 mg by mouth daily.  CALCIUM CARBONATE/VITAMIN D3 (CALCIUM 600 + D,3, PO) Take  by mouth.  magnesium 250 mg Tab Take  by mouth.  OMEGA-3 FATTY ACIDS (OMEGA 3 PO) Take 4 Tabs by mouth daily.  traMADol (ULTRAM) 50 mg tablet Take 1 Tab by mouth every eight (8) hours as needed for Pain. Max Daily Amount: 150 mg. 180 Tab 1    carisoprodol (SOMA) 250 mg tablet TAKE 1 TABLET BY MOUTH 4 TIMES A DAY AS NEEDED FOR MUSCLE SPASM 90 Tab 1    diclofenac EC (VOLTAREN) 75 mg EC tablet TAKE 1 TABLET TWICE A DAY AS NEEDED 180 Tab 1    albuterol (VENTOLIN HFA) 90 mcg/actuation inhaler Take 2 Puffs by inhalation every four (4) hours as needed for Wheezing. 18 Inhaler 1    clotrimazole-betamethasone (LOTRISONE) topical cream Apply to rash twice a day.  45 g 3     Allergies   Allergen Reactions    Crestor [Rosuvastatin] Other (comments)     Increased CK    Simvastatin Myalgia     Past Medical History:   Diagnosis Date    Arthritis 12/10/2010    Breast cancer ([address]     removed 2/2/09    Diabetes ([facility] [address]     Fatty liver 12/10/2010    Hypercholesterolemia     Sleep apnea 12/10/2010    Thyroid disease      Past Surgical History:   Procedure Laterality Date    ENDOSCOPY, COLON, DIAGNOSTIC  1/2009    HX BREAST LUMPECTOMY  2/2/09 reincision    HX CATARACT REMOVAL Right 11/2016    HX CATARACT REMOVAL Left 11/2016    HX GYN  2010    cervical polypectomy    HX HEENT      HX LITHOTRIPSY  1994    HX ORTHOPAEDIC      foot    HX TONSIL AND ADENOIDECTOMY  1960     Family History   Problem Relation Age of Onset    Cancer Mother     COPD Mother     Cancer Father      mesothelioma    Arthritis-rheumatoid Sister      Social History   Substance Use Topics    Smoking status: Never Smoker    Smokeless tobacco: Never Used    Alcohol use No             Review of Systems  Pertinent items are noted in HPI. Objective:     Visit Vitals    /70 (BP 1 Location: Left arm, BP Patient Position: Sitting)  Comment: manual    Pulse 82    Temp 98.2 °F (36.8 °C) (Oral)    Resp 20    Ht 5' 3\" (1.6 m)    Wt 190 lb (86.2 kg)    SpO2 99%    BMI 33.66 kg/m2     Appearance: alert, well appearing, and in no distress and overweight. Exam: heart sounds normal rate, regular rhythm, normal S1, S2, no murmurs, rubs, clicks or gallops, chest clear  Diabetic foot exam:     Left Foot:   Visual Exam: normal , 1 ST MTP arthritis   Pulse DP: 2+ (normal)   Filament test: normal sensation          Right Foot:   Visual Exam: normal    Pulse DP: 2+ (normal)   Filament test: normal sensation          Lab review: orders written for new lab studies as appropriate; see orders. Assessment/Plan:     diabetes , hypertension stable, hyperlipidemia . Diabetic issues reviewed with her: glycohemoglobin and other lab monitoring discussed. ICD-10-CM ICD-9-CM    1. Type 2 diabetes mellitus without complication, without long-term current use of insulin (HCC) E11.9 250.00 AMB POC URINE, MICROALBUMIN, SEMIQUANT (3 RESULTS)       DIABETES FOOT EXAM      HEMOGLOBIN A1C WITH EAG   2. Hypercholesterolemia E78.00 272.0 LIPID PANEL   3. Acquired hypothyroidism E03.9 244.9 TSH 3RD GENERATION      T4, FREE   4.  Hand arthritis M19.049 716.94 REFERRAL TO ORTHOPEDIC SURGERY   5. Encounter for long-term current use of medication Z79.899 V58.69 CBC WITH AUTOMATED DIFF      METABOLIC PANEL, COMPREHENSIVE     Order labs. Refer to Hand specialist for arthritis. Discussed the patient's BMI with her. The BMI follow up plan is as follows:     dietary management education, guidance, and counseling  encourage exercise  monitor weight  prescribed dietary intake    An After Visit Summary was printed and given to the patient.

## 2018-09-19 NOTE — PROGRESS NOTES
Identified pt with two pt identifiers(name and ). Chief Complaint   Patient presents with    Diabetes    Foot Pain     she saw Dr Vitor Calderon on 18         Health Maintenance Due   Topic    Influenza Age 5 to Adult     FOOT EXAM Q1     MICROALBUMIN Q1     HEMOGLOBIN A1C Q6M    Flu shot at work    [employer] Readings from Last 3 Encounters:   18 190 lb (86.2 kg)   18 193 lb 12.8 oz (87.9 kg)   17 193 lb 6.4 oz (87.7 kg)     Temp Readings from Last 3 Encounters:   18 98.2 °F (36.8 °C) (Oral)   18 97.6 °F (36.4 °C) (Oral)   17 97.7 °F (36.5 °C) (Oral)     BP Readings from Last 3 Encounters:   18 110/70   18 104/60   17 110/66     Pulse Readings from Last 3 Encounters:   18 82   18 63   17 77         Learning Assessment:  :     Learning Assessment 2018 1/15/2014   PRIMARY LEARNER Patient Patient   HIGHEST LEVEL OF EDUCATION - PRIMARY LEARNER  > 4 YEARS OF COLLEGE > 4 YEARS OF COLLEGE   BARRIERS PRIMARY LEARNER NONE NONE   CO-LEARNER CAREGIVER No No   PRIMARY LANGUAGE ENGLISH ENGLISH   LEARNER PREFERENCE PRIMARY DEMONSTRATION DEMONSTRATION   ANSWERED BY self self   RELATIONSHIP SELF SELF       Depression Screening:  :     PHQ over the last two weeks 2018   Little interest or pleasure in doing things Not at all   Feeling down, depressed, irritable, or hopeless Not at all   Total Score PHQ 2 0       Fall Risk Assessment:  :     Fall Risk Assessment, last 12 mths 2018   Able to walk? Yes   Fall in past 12 months? No       Abuse Screening:  :     Abuse Screening Questionnaire 2018   Do you ever feel afraid of your partner? N - N N   Are you in a relationship with someone who physically or mentally threatens you? N N N N   Is it safe for you to go home?  Julieta Mcbride       Coordination of Care Questionnaire:  :     1) Have you been to an emergency room, urgent care clinic since your last visit? no Hospitalized since your last visit? no             2) Have you seen or consulted any other health care providers outside of [address] [place] [address] since your last visit? yes  Dr Alice Briggs (Include any pap smears or colon screenings in this section.)    3) Do you have an Advance Directive on file? yes  Are you interested in receiving information about Advance Directives? no    Reviewed record in preparation for visit and have obtained necessary documentation. Medication reconciliation up to date and corrected with patient at this time.      Results for orders placed or performed in visit on 09/19/18   AMB POC URINE, MICROALBUMIN, SEMIQUANT (3 RESULTS)   Result Value Ref Range    ALBUMIN, URINE POC 10 Negative mg/L    CREATININE, URINE  mg/dL    Microalbumin/creat ratio (POC) <30 <30 MG/G

## 2018-09-20 LAB
ALBUMIN SERPL-MCNC: 4.4 G/DL (ref 3.6–4.8)
ALBUMIN/GLOB SERPL: 1.8 {RATIO} (ref 1.2–2.2)
ALP SERPL-CCNC: 63 IU/L (ref 39–117)
ALT SERPL-CCNC: 44 IU/L (ref 0–32)
AST SERPL-CCNC: 33 IU/L (ref 0–40)
BASOPHILS # BLD AUTO: 0 X10E3/UL (ref 0–0.2)
BASOPHILS NFR BLD AUTO: 1 %
BILIRUB SERPL-MCNC: 0.3 MG/DL (ref 0–1.2)
BUN SERPL-MCNC: 11 MG/DL (ref 8–27)
BUN/CREAT SERPL: 15 (ref 12–28)
CALCIUM SERPL-MCNC: 9.5 MG/DL (ref 8.7–10.3)
CHLORIDE SERPL-SCNC: 101 MMOL/L (ref 96–106)
CHOLEST SERPL-MCNC: 184 MG/DL (ref 100–199)
CO2 SERPL-SCNC: 26 MMOL/L (ref 20–29)
CREAT SERPL-MCNC: 0.71 MG/DL (ref 0.57–1)
EOSINOPHIL # BLD AUTO: 0.2 X10E3/UL (ref 0–0.4)
EOSINOPHIL NFR BLD AUTO: 2 %
ERYTHROCYTE [DISTWIDTH] IN BLOOD BY AUTOMATED COUNT: 13.8 % (ref 12.3–15.4)
EST. AVERAGE GLUCOSE BLD GHB EST-MCNC: 117 MG/DL
GLOBULIN SER CALC-MCNC: 2.4 G/DL (ref 1.5–4.5)
GLUCOSE SERPL-MCNC: 81 MG/DL (ref 65–99)
HBA1C MFR BLD: 5.7 % (ref 4.8–5.6)
HCT VFR BLD AUTO: 40.1 % (ref 34–46.6)
HDLC SERPL-MCNC: 52 MG/DL
HGB BLD-MCNC: 12.9 G/DL (ref 11.1–15.9)
IMM GRANULOCYTES # BLD: 0 X10E3/UL (ref 0–0.1)
IMM GRANULOCYTES NFR BLD: 0 %
INTERPRETATION, 910389: NORMAL
LDLC SERPL CALC-MCNC: 86 MG/DL (ref 0–99)
LYMPHOCYTES # BLD AUTO: 2 X10E3/UL (ref 0.7–3.1)
LYMPHOCYTES NFR BLD AUTO: 31 %
Lab: NORMAL
MCH RBC QN AUTO: 29.5 PG (ref 26.6–33)
MCHC RBC AUTO-ENTMCNC: 32.2 G/DL (ref 31.5–35.7)
MCV RBC AUTO: 92 FL (ref 79–97)
MONOCYTES # BLD AUTO: 0.5 X10E3/UL (ref 0.1–0.9)
MONOCYTES NFR BLD AUTO: 7 %
NEUTROPHILS # BLD AUTO: 3.8 X10E3/UL (ref 1.4–7)
NEUTROPHILS NFR BLD AUTO: 59 %
PLATELET # BLD AUTO: 244 X10E3/UL (ref 150–379)
POTASSIUM SERPL-SCNC: 4.6 MMOL/L (ref 3.5–5.2)
PROT SERPL-MCNC: 6.8 G/DL (ref 6–8.5)
RBC # BLD AUTO: 4.37 X10E6/UL (ref 3.77–5.28)
SODIUM SERPL-SCNC: 139 MMOL/L (ref 134–144)
T4 FREE SERPL-MCNC: 1.23 NG/DL (ref 0.82–1.77)
TRIGL SERPL-MCNC: 232 MG/DL (ref 0–149)
TSH SERPL DL<=0.005 MIU/L-ACNC: 5.29 UIU/ML (ref 0.45–4.5)
VLDLC SERPL CALC-MCNC: 46 MG/DL (ref 5–40)
WBC # BLD AUTO: 6.4 X10E3/UL (ref 3.4–10.8)

## 2018-09-21 ENCOUNTER — TELEPHONE (OUTPATIENT)
Dept: FAMILY MEDICINE CLINIC | Age: 63
End: 2018-09-21

## 2018-09-21 DIAGNOSIS — E03.9 ACQUIRED HYPOTHYROIDISM: Primary | ICD-10-CM

## 2018-09-21 RX ORDER — LEVOTHYROXINE SODIUM 175 UG/1
175 TABLET ORAL
Qty: 90 TAB | Refills: 1 | Status: SHIPPED | OUTPATIENT
Start: 2018-09-21 | End: 2019-02-03 | Stop reason: SDUPTHER

## 2018-09-21 NOTE — PROGRESS NOTES
Labs show improvement in cholesterol numbers. Triglycerides remain a little elevated. Thyroid level is low. I will increase dose of Levothyroxine. A1C level is better! Normal kidney and liver function. Follow up in 3 months to recheck thyroid.

## 2018-12-21 ENCOUNTER — OFFICE VISIT (OUTPATIENT)
Dept: FAMILY MEDICINE CLINIC | Age: 63
End: 2018-12-21

## 2018-12-21 VITALS
OXYGEN SATURATION: 98 % | DIASTOLIC BLOOD PRESSURE: 70 MMHG | TEMPERATURE: 97.6 F | SYSTOLIC BLOOD PRESSURE: 108 MMHG | HEIGHT: 63 IN | BODY MASS INDEX: 33.13 KG/M2 | WEIGHT: 187 LBS | HEART RATE: 75 BPM | RESPIRATION RATE: 20 BRPM

## 2018-12-21 DIAGNOSIS — E03.9 ACQUIRED HYPOTHYROIDISM: ICD-10-CM

## 2018-12-21 DIAGNOSIS — Z91.09 ENVIRONMENTAL ALLERGIES: ICD-10-CM

## 2018-12-21 DIAGNOSIS — Z79.899 ENCOUNTER FOR LONG-TERM CURRENT USE OF MEDICATION: ICD-10-CM

## 2018-12-21 DIAGNOSIS — Z01.818 PRE-OP EXAM: ICD-10-CM

## 2018-12-21 DIAGNOSIS — M15.9 PRIMARY OSTEOARTHRITIS INVOLVING MULTIPLE JOINTS: Primary | ICD-10-CM

## 2018-12-21 RX ORDER — LEVOCETIRIZINE DIHYDROCHLORIDE 5 MG/1
5 TABLET, FILM COATED ORAL DAILY
Qty: 90 TAB | Refills: 3 | Status: SHIPPED | OUTPATIENT
Start: 2018-12-21 | End: 2020-05-15 | Stop reason: ALTCHOICE

## 2018-12-21 NOTE — PROGRESS NOTES
Preoperative Evaluation    Date of Exam: 2018    Olga Lidia Calderon is a 59 y.o. female (:1955) who presents for preoperative evaluation. Scheduled for L IF DIP joitn fusion by Dr Jessy Low on 2019. Latex Allergy: no    Problem List:     Patient Active Problem List    Diagnosis Date Noted    Diabetes ([address] 12/10/2010    Hypercholesterolemia 12/10/2010    Hypothyroidism 12/10/2010    HX: breast cancer 12/10/2010    Environmental allergies 12/10/2010    Arthritis 12/10/2010    Sleep apnea 12/10/2010    Fatty liver 12/10/2010     Medical History:     Past Medical History:   Diagnosis Date    Arthritis 12/10/2010    Breast cancer ([facility] [address]     removed 09    Diabetes ([facility] [address]     Fatty liver 12/10/2010    Hypercholesterolemia     Sleep apnea 12/10/2010    Thyroid disease      Allergies: Allergies   Allergen Reactions    Crestor [Rosuvastatin] Other (comments)     Increased CK    Simvastatin Myalgia      Medications:     Current Outpatient Medications   Medication Sig    levocetirizine (XYZAL) 5 mg tablet Take 1 Tab by mouth daily.  levothyroxine (SYNTHROID) 175 mcg tablet Take 1 Tab by mouth Daily (before breakfast). Indications: hypothyroidism    VICTOZA 3-ELVIS 0.6 mg/0.1 mL (18 mg/3 mL) pnij INJECT 1.8 MG UNDER THE SKIN ONCE DAILY    ezetimibe (ZETIA) 10 mg tablet TAKE 1 TABLET DAILY FOR HYPERCHOLESTEROLEMIA    lisinopril (PRINIVIL, ZESTRIL) 10 mg tablet Take 0.5 Tabs by mouth daily.  Insulin Needles, Disposable, (LACY PEN NEEDLE) 32 gauge x 5/32\" ndle Use for Victoza pen one daily.  metFORMIN ER (GLUCOPHAGE XR) 500 mg tablet TAKE 2 TABLETS IN THE MORNING AND 2 TABLETS IN THE EVENING    venlafaxine-SR (EFFEXOR-XR) 75 mg capsule TAKE 1 CAPSULE DAILY    WELCHOL 625 mg tablet TAKE 3 TABLETS TWICE A DAY WITH MEALS    cholecalciferol, vitamin D3, (VITAMIN D3) 4,000 unit cap Take 4,000 Units by mouth daily.  (Patient taking differently: Take 5,000 Units by mouth daily.)  mometasone (NASONEX) 50 mcg/actuation nasal spray 2 Sprays daily.  clotrimazole-betamethasone (LOTRISONE) topical cream Apply to rash twice a day.  ferrous sulfate 325 mg (65 mg iron) tablet Take  by mouth Daily (before breakfast).  fluticasone (FLONASE) 50 mcg/actuation nasal spray USE 2 SPRAYS TO BOTH NOSTRIL ONCE DAILY    milk thistle 500 mg cap Take 500 mg by mouth daily.  glucose blood VI test strips (ONE TOUCH ULTRA TEST) strip Use as directed    aspirin delayed-release 81 mg tablet Take  by mouth daily.  Omeprazole delayed release (PRILOSEC D/R) 20 mg tablet Take 1 Tab by mouth daily.  MULTIVITAMIN PO Take  by mouth.  cyanocobalamin (VITAMIN B-12) 2,500 mcg Subl Take 2,500 mg by mouth daily.  CALCIUM CARBONATE/VITAMIN D3 (CALCIUM 600 + D,3, PO) Take  by mouth.  magnesium 250 mg Tab Take  by mouth.  OMEGA-3 FATTY ACIDS (OMEGA 3 PO) Take 4 Tabs by mouth daily.  traMADol (ULTRAM) 50 mg tablet Take 1 Tab by mouth every eight (8) hours as needed for Pain. Max Daily Amount: 150 mg.    carisoprodol (SOMA) 250 mg tablet TAKE 1 TABLET BY MOUTH 4 TIMES A DAY AS NEEDED FOR MUSCLE SPASM    diclofenac EC (VOLTAREN) 75 mg EC tablet TAKE 1 TABLET TWICE A DAY AS NEEDED    albuterol (VENTOLIN HFA) 90 mcg/actuation inhaler Take 2 Puffs by inhalation every four (4) hours as needed for Wheezing. No current facility-administered medications for this visit.       Surgical History:     Past Surgical History:   Procedure Laterality Date    ENDOSCOPY, COLON, DIAGNOSTIC  1/2009    HX BREAST LUMPECTOMY  2/2/09    reincision    HX CATARACT REMOVAL Right 11/2016    HX CATARACT REMOVAL Left 11/2016    HX GYN  2010    cervical polypectomy    HX HEENT      HX LITHOTRIPSY  1994    HX ORTHOPAEDIC      foot    HX TONSIL AND ADENOIDECTOMY  1960     Social History:     Social History     Socioeconomic History    Marital status:      Spouse name: Not on file    Number of children: Not on file    Years of education: Not on file    Highest education level: Not on file   Tobacco Use    Smoking status: Never Smoker    Smokeless tobacco: Never Used   Substance and Sexual Activity    Alcohol use: No     Alcohol/week: 0.0 oz    Drug use: No    Sexual activity: Yes     Partners: Male       Anesthesia Complications: None  History of abnormal bleeding : None  History of Blood Transfusions: no  Health Care Directive or Living Will: yes    Objective:     ROS:   Feeling well. No dyspnea or chest pain on exertion. No abdominal pain, change in bowel habits, black or bloody stools. No urinary tract symptoms. GYN ROS: . No neurological complaints. OBJECTIVE:   The patient appears well, alert, oriented x 3, in no distress. Visit Vitals  /70 (BP 1 Location: Left arm, BP Patient Position: Sitting) Comment: manual   Pulse 75   Temp 97.6 °F (36.4 °C) (Oral)   Resp 20   Ht 5' 3\" (1.6 m)   Wt 187 lb (84.8 kg)   SpO2 98%   BMI 33.13 kg/m²     HEENT:ENT normal.  Neck supple. No adenopathy or thyromegaly. ROBIN. Chest: Lungs are clear, good air entry, no wheezes, rhonchi or rales. Cardiovascular: S1 and S2 normal, no murmurs, regular rate and rhythm. Abdomen: soft without tenderness, guarding, mass or organomegaly. Extremities: show no edema, normal peripheral pulses. Neurological: is normal, no focal findings. DIAGNOSTICS:   1. EKG: EKG FINDINGS - normal EKG, normal sinus rhythm    2. Labs: ordered. IMPRESSION:   Low risk for planned surgery  No contraindications to planned surgery. Pt advised to stop taking ASA, NSAID, and supplements 7 days prior to surgery.     Sally Robison MD   17/32/2306

## 2018-12-21 NOTE — PROGRESS NOTES
Identified pt with two pt identifiers(name and ). Chief Complaint   Patient presents with    Pre-op Exam     LIF DIP joint fusion surgery on 19 Dr Hamilton Prince      in hospital with pneumonia - they moved him and ended up having to do CPR -     Diabetes    Cholesterol Problem    Thyroid Problem    Knee Pain     right knee started hurting - [facility] a month ago         Health Maintenance Due   Topic    Shingrix Vaccine Age 50> (1 of 2)    DTaP/Tdap/Td series (2 - Td)    PAP AKA CERVICAL CYTOLOGY        Wt Readings from Last 3 Encounters:   18 187 lb (84.8 kg)   18 190 lb (86.2 kg)   18 193 lb 12.8 oz (87.9 kg)     Temp Readings from Last 3 Encounters:   18 97.6 °F (36.4 °C) (Oral)   18 98.2 °F (36.8 °C) (Oral)   18 97.6 °F (36.4 °C) (Oral)     BP Readings from Last 3 Encounters:   18 108/70   18 110/70   18 104/60     Pulse Readings from Last 3 Encounters:   18 75   18 82   18 63         Learning Assessment:  :     Learning Assessment 2018 1/15/2014   PRIMARY LEARNER Patient Patient   HIGHEST LEVEL OF EDUCATION - PRIMARY LEARNER  > 4 YEARS OF COLLEGE > 4 YEARS OF COLLEGE   BARRIERS PRIMARY LEARNER NONE NONE   CO-LEARNER CAREGIVER No No   PRIMARY LANGUAGE ENGLISH ENGLISH   LEARNER PREFERENCE PRIMARY DEMONSTRATION DEMONSTRATION   ANSWERED BY self self   RELATIONSHIP SELF SELF       Depression Screening:  :     PHQ over the last two weeks 2018   Little interest or pleasure in doing things Not at all   Feeling down, depressed, irritable, or hopeless Not at all   Total Score PHQ 2 0       Fall Risk Assessment:  :     Fall Risk Assessment, last 12 mths 2018   Able to walk? Yes   Fall in past 12 months? No       Abuse Screening:  :     Abuse Screening Questionnaire 2018   Do you ever feel afraid of your partner?  N - N N   Are you in a relationship with someone who physically or mentally threatens you? N N N N   Is it safe for you to go home? Oneyda Ryan       Coordination of Care Questionnaire:  :     1) Have you been to an emergency room, urgent care clinic since your last visit? no   Hospitalized since your last visit? no             2) Have you seen or consulted any other health care providers outside of [address] [place] [address] since your last visit? yes  [facility], Dr Melvin Romo (Include any pap smears or colon screenings in this section.)    3) Do you have an Advance Directive on file? yes  Are you interested in receiving information about Advance Directives? no    Reviewed record in preparation for visit and have obtained necessary documentation. Medication reconciliation up to date and corrected with patient at this time.

## 2018-12-22 LAB
BUN SERPL-MCNC: 8 MG/DL (ref 8–27)
BUN/CREAT SERPL: 12 (ref 12–28)
CALCIUM SERPL-MCNC: 9.7 MG/DL (ref 8.7–10.3)
CHLORIDE SERPL-SCNC: 100 MMOL/L (ref 96–106)
CO2 SERPL-SCNC: 22 MMOL/L (ref 20–29)
CREAT SERPL-MCNC: 0.68 MG/DL (ref 0.57–1)
GLUCOSE SERPL-MCNC: 103 MG/DL (ref 65–99)
POTASSIUM SERPL-SCNC: 4.9 MMOL/L (ref 3.5–5.2)
SODIUM SERPL-SCNC: 139 MMOL/L (ref 134–144)
T4 FREE SERPL-MCNC: 1.29 NG/DL (ref 0.82–1.77)
TSH SERPL DL<=0.005 MIU/L-ACNC: 1.49 UIU/ML (ref 0.45–4.5)

## 2018-12-23 ENCOUNTER — TELEPHONE (OUTPATIENT)
Dept: FAMILY MEDICINE CLINIC | Age: 63
End: 2018-12-23

## 2019-01-21 RX ORDER — VENLAFAXINE HYDROCHLORIDE 75 MG/1
CAPSULE, EXTENDED RELEASE ORAL
Qty: 90 CAP | Refills: 3 | Status: SHIPPED | OUTPATIENT
Start: 2019-01-21 | End: 2020-01-17

## 2019-01-21 RX ORDER — COLESEVELAM 180 1/1
TABLET ORAL
Qty: 540 TAB | Refills: 3 | Status: SHIPPED | OUTPATIENT
Start: 2019-01-21 | End: 2020-01-17

## 2019-01-21 RX ORDER — METFORMIN HYDROCHLORIDE 500 MG/1
TABLET, EXTENDED RELEASE ORAL
Qty: 360 TAB | Refills: 3 | Status: SHIPPED | OUTPATIENT
Start: 2019-01-21 | End: 2020-01-17

## 2019-02-03 DIAGNOSIS — E03.9 ACQUIRED HYPOTHYROIDISM: ICD-10-CM

## 2019-02-03 RX ORDER — LEVOTHYROXINE SODIUM 175 UG/1
TABLET ORAL
Qty: 90 TAB | Refills: 1 | Status: SHIPPED | OUTPATIENT
Start: 2019-02-03 | End: 2019-09-13 | Stop reason: SDUPTHER

## 2019-02-13 DIAGNOSIS — M62.838 MUSCLE SPASM: Primary | ICD-10-CM

## 2019-02-13 DIAGNOSIS — J98.01 BRONCHOSPASM: ICD-10-CM

## 2019-02-13 RX ORDER — ALBUTEROL SULFATE 90 UG/1
2 AEROSOL, METERED RESPIRATORY (INHALATION)
Qty: 18 INHALER | Refills: 1 | Status: SHIPPED | OUTPATIENT
Start: 2019-02-13 | End: 2019-02-15 | Stop reason: CLARIF

## 2019-02-13 RX ORDER — CARISOPRODOL 250 MG/1
TABLET ORAL
Qty: 90 TAB | Refills: 1 | Status: SHIPPED | OUTPATIENT
Start: 2019-02-13 | End: 2019-07-10 | Stop reason: SDUPTHER

## 2019-02-15 ENCOUNTER — TELEPHONE (OUTPATIENT)
Dept: FAMILY MEDICINE CLINIC | Age: 64
End: 2019-02-15

## 2019-02-15 DIAGNOSIS — J98.01 BRONCHOSPASM: Primary | ICD-10-CM

## 2019-02-15 RX ORDER — ALBUTEROL SULFATE 90 UG/1
2 AEROSOL, METERED RESPIRATORY (INHALATION)
Qty: 3 INHALER | Refills: 1 | Status: SHIPPED | OUTPATIENT
Start: 2019-02-15

## 2019-02-18 ENCOUNTER — TELEPHONE (OUTPATIENT)
Dept: FAMILY MEDICINE CLINIC | Age: 64
End: 2019-02-18

## 2019-02-18 NOTE — TELEPHONE ENCOUNTER
I am unable to call George Xiong. He is only on HIPAA to  prescriptions. I cannot speak or listen to anything he has to say about Mrs. Eugene Bustamante.

## 2019-02-18 NOTE — TELEPHONE ENCOUNTER
Pt's son wants to have dr Nina Doss give him a call in regards to pt. Aylin Ann has information he would like dr Nina Doss to be aware of and knows that dr Nina Doss can listen but not give advice since he is only listed on the hippa as picking up medication.  Please contact vinay at [phone]

## 2019-02-20 DIAGNOSIS — E78.00 HYPERCHOLESTEROLEMIA: ICD-10-CM

## 2019-02-20 RX ORDER — EZETIMIBE 10 MG/1
TABLET ORAL
Qty: 90 TAB | Refills: 1 | Status: SHIPPED | OUTPATIENT
Start: 2019-02-20 | End: 2019-09-13 | Stop reason: SDUPTHER

## 2019-03-05 DIAGNOSIS — E11.9 TYPE 2 DIABETES MELLITUS WITHOUT COMPLICATION, WITHOUT LONG-TERM CURRENT USE OF INSULIN (HCC): ICD-10-CM

## 2019-03-05 RX ORDER — PEN NEEDLE, DIABETIC 31 GX3/16"
NEEDLE, DISPOSABLE MISCELLANEOUS
Qty: 90 PEN NEEDLE | Refills: 4 | Status: SHIPPED | OUTPATIENT
Start: 2019-03-05 | End: 2019-08-22 | Stop reason: SDUPTHER

## 2019-03-09 DIAGNOSIS — E11.9 TYPE 2 DIABETES MELLITUS WITHOUT COMPLICATION, WITHOUT LONG-TERM CURRENT USE OF INSULIN (HCC): ICD-10-CM

## 2019-03-10 RX ORDER — LEVOTHYROXINE SODIUM 150 UG/1
TABLET ORAL
Qty: 90 TAB | Refills: 1 | OUTPATIENT
Start: 2019-03-10

## 2019-03-10 RX ORDER — LIRAGLUTIDE 6 MG/ML
INJECTION SUBCUTANEOUS
Qty: 27 ML | Refills: 1 | Status: SHIPPED | OUTPATIENT
Start: 2019-03-10 | End: 2019-11-27 | Stop reason: SDUPTHER

## 2019-03-20 ENCOUNTER — PATIENT MESSAGE (OUTPATIENT)
Dept: FAMILY MEDICINE CLINIC | Age: 64
End: 2019-03-20

## 2019-03-20 DIAGNOSIS — K21.9 GASTROESOPHAGEAL REFLUX DISEASE, ESOPHAGITIS PRESENCE NOT SPECIFIED: Primary | ICD-10-CM

## 2019-03-20 DIAGNOSIS — K21.9 GERD (GASTROESOPHAGEAL REFLUX DISEASE): ICD-10-CM

## 2019-03-21 RX ORDER — PHENOL/SODIUM PHENOLATE
20 AEROSOL, SPRAY (ML) MUCOUS MEMBRANE DAILY
Qty: 90 TAB | Refills: 1 | Status: SHIPPED | OUTPATIENT
Start: 2019-03-21 | End: 2019-09-20 | Stop reason: SDUPTHER

## 2019-03-21 NOTE — TELEPHONE ENCOUNTER
From: Cheryl Carlson  To: Shalini Messina MD  Sent: 9/95/3854 7:54 PM EDT  Subject: Prescription Question    Can you please send me a [facility]? They work so much better.  Thanks

## 2019-07-08 ENCOUNTER — TELEPHONE (OUTPATIENT)
Dept: FAMILY MEDICINE CLINIC | Age: 64
End: 2019-07-08

## 2019-07-08 NOTE — TELEPHONE ENCOUNTER
Regarding: FW: Non-Urgent Medical Question  Contact: [phone]      ----- Message -----  From: Chester Stapleton  Sent: 7/8/2019  11:25 AM  To: Clayton Bragg Pool  Subject: Non-Urgent Medical Question                      ----- Message from [address] [place] [address] [address], Generic sent at 7/8/2019 11:25 AM EDT -----    Our pharmacy coverage has changed to [facility]. Please discard [facility]. The number is [phone]. Thanks.        Wes Mathews

## 2019-07-10 ENCOUNTER — TELEPHONE (OUTPATIENT)
Dept: FAMILY MEDICINE CLINIC | Age: 64
End: 2019-07-10

## 2019-07-10 DIAGNOSIS — M62.838 MUSCLE SPASM: ICD-10-CM

## 2019-07-10 RX ORDER — CARISOPRODOL 250 MG/1
TABLET ORAL
Qty: 90 TAB | Refills: 1 | Status: SHIPPED | OUTPATIENT
Start: 2019-07-10 | End: 2020-10-15 | Stop reason: SDUPTHER

## 2019-07-10 NOTE — TELEPHONE ENCOUNTER
Regarding: FW: Prescription Question  Contact: [phone]      ----- Message -----  From: Linda Amaral  Sent: 7/10/2019  12:11 PM  To: Thao Lerma  Subject: Prescription Question                            ----- Message from [address] [place] [address], [facility] sent at 7/10/2019 12:11 PM EDT -----    Can you please order me a refil on carisoprodol?  Thanks    Sherrie Bernstein

## 2019-08-22 ENCOUNTER — OFFICE VISIT (OUTPATIENT)
Dept: FAMILY MEDICINE CLINIC | Age: 64
End: 2019-08-22

## 2019-08-22 VITALS
HEIGHT: 63 IN | DIASTOLIC BLOOD PRESSURE: 62 MMHG | WEIGHT: 188.2 LBS | OXYGEN SATURATION: 97 % | RESPIRATION RATE: 20 BRPM | BODY MASS INDEX: 33.35 KG/M2 | HEART RATE: 62 BPM | TEMPERATURE: 97.8 F | SYSTOLIC BLOOD PRESSURE: 100 MMHG

## 2019-08-22 DIAGNOSIS — Z00.00 WELL WOMAN EXAM (NO GYNECOLOGICAL EXAM): Primary | ICD-10-CM

## 2019-08-22 DIAGNOSIS — Z23 ENCOUNTER FOR IMMUNIZATION: ICD-10-CM

## 2019-08-22 DIAGNOSIS — E55.9 VITAMIN D DEFICIENCY: ICD-10-CM

## 2019-08-22 DIAGNOSIS — Z23 NEED FOR SHINGLES VACCINE: ICD-10-CM

## 2019-08-22 DIAGNOSIS — E11.9 TYPE 2 DIABETES MELLITUS WITHOUT COMPLICATION, WITHOUT LONG-TERM CURRENT USE OF INSULIN (HCC): ICD-10-CM

## 2019-08-22 LAB
ALBUMIN UR QL STRIP: 10 MG/L
CREATININE, URINE POC: 100 MG/DL
MICROALBUMIN/CREAT RATIO POC: <30 MG/G

## 2019-08-22 NOTE — PROGRESS NOTES
Subjective:   59 y.o. female for Well Woman Check. Her gyne and breast care is done elsewhere by her Ob-Gyne physician. Patient Active Problem List    Diagnosis Date Noted    Diabetes ([address] 12/10/2010    Hypercholesterolemia 12/10/2010    Hypothyroidism 12/10/2010    HX: breast cancer 12/10/2010    Environmental allergies 12/10/2010    Arthritis 12/10/2010    Sleep apnea 12/10/2010    Fatty liver 12/10/2010     Current Outpatient Medications   Medication Sig Dispense Refill    varicella-zoster recombinant, PF, (SHINGRIX, PF,) 50 mcg/0.5 mL susr injection 0.5 mL by IntraMUSCular route once for 1 dose. Indications: Prevention of Shingles 0.5 mL 1    Omeprazole delayed release (PRILOSEC D/R) 20 mg tablet Take 1 Tab by mouth daily. 90 Tab 1    VICTOZA 3-ELVIS 0.6 mg/0.1 mL (18 mg/3 mL) pnij INJECT 1.8 MG UNDER THE SKIN ONCE DAILY 27 mL 1    ezetimibe (ZETIA) 10 mg tablet TAKE 1 TABLET DAILY FOR HYPERCHOLESTEROLEMIA 90 Tab 1    levothyroxine (SYNTHROID) 175 mcg tablet TAKE 1 TABLET DAILY BEFORE BREAKFAST FOR HYPOTHYROIDISM (NEW DOSE) 90 Tab 1    colesevelam (WELCHOL) 625 mg tablet TAKE 3 TABLETS TWICE A DAY WITH MEALS 540 Tab 3    venlafaxine-SR (EFFEXOR-XR) 75 mg capsule TAKE 1 CAPSULE DAILY 90 Cap 3    metFORMIN ER (GLUCOPHAGE XR) 500 mg tablet TAKE 2 TABLETS IN THE MORNING AND 2 TABLETS IN THE EVENING 360 Tab 3    lisinopril (PRINIVIL, ZESTRIL) 10 mg tablet Take 0.5 Tabs by mouth daily. 90 Tab 3    Insulin Needles, Disposable, (LACY PEN NEEDLE) 32 gauge x 5/32\" ndle Use for Victoza pen one daily. 30 Pen Needle 4    cholecalciferol, vitamin D3, (VITAMIN D3) 4,000 unit cap Take 4,000 Units by mouth daily. (Patient taking differently: Take 5,000 Units by mouth daily.) 90 Cap 5    clotrimazole-betamethasone (LOTRISONE) topical cream Apply to rash twice a day. 45 g 3    ferrous sulfate 325 mg (65 mg iron) tablet Take  by mouth Daily (before breakfast).       fluticasone (FLONASE) 50 mcg/actuation nasal spray USE 2 SPRAYS TO BOTH NOSTRIL ONCE DAILY 16 g 5    milk thistle 500 mg cap Take 500 mg by mouth daily. 60 Cap 0    glucose blood VI test strips (ONE TOUCH ULTRA TEST) strip Use as directed 1 Package 11    MULTIVITAMIN PO Take  by mouth.  cyanocobalamin (VITAMIN B-12) 2,500 mcg Subl Take 2,500 mg by mouth daily.  CALCIUM CARBONATE/VITAMIN D3 (CALCIUM 600 + D,3, PO) Take  by mouth.  magnesium 250 mg Tab Take  by mouth.  OMEGA-3 FATTY ACIDS (OMEGA 3 PO) Take 4 Tabs by mouth daily.  carisoprodol (SOMA) 250 mg tablet TAKE 1 TABLET BY MOUTH 4 TIMES A DAY AS NEEDED FOR MUSCLE SPASM 90 Tab 1    albuterol (PROVENTIL HFA, VENTOLIN HFA, PROAIR HFA) 90 mcg/actuation inhaler Take 2 Puffs by inhalation every four (4) hours as needed for Wheezing. PRO AIR HFA 3 Inhaler 1    levocetirizine (XYZAL) 5 mg tablet Take 1 Tab by mouth daily. 90 Tab 3    traMADol (ULTRAM) 50 mg tablet Take 1 Tab by mouth every eight (8) hours as needed for Pain. Max Daily Amount: 150 mg. 180 Tab 1    diclofenac EC (VOLTAREN) 75 mg EC tablet TAKE 1 TABLET TWICE A DAY AS NEEDED 180 Tab 1    mometasone (NASONEX) 50 mcg/actuation nasal spray 2 Sprays daily.  aspirin delayed-release 81 mg tablet Take  by mouth daily.        Allergies   Allergen Reactions    Crestor [Rosuvastatin] Other (comments)     Increased CK    Simvastatin Myalgia    Bee Pollen Itching    Mite Extract Itching     Past Medical History:   Diagnosis Date    Arthritis 12/10/2010    Breast cancer ([address]     removed 2/2/09    Diabetes ([address]     Fatty liver 12/10/2010    Hypercholesterolemia     Sleep apnea 12/10/2010    Thyroid disease      Past Surgical History:   Procedure Laterality Date    ENDOSCOPY, COLON, DIAGNOSTIC  1/2009    HX BREAST LUMPECTOMY  2/2/09    reincision    HX CATARACT REMOVAL Right 11/2016    HX CATARACT REMOVAL Left 11/2016    HX GYN  2010    cervical polypectomy    HX HEENT      HX LITHOTRIPSY  1994    HX ORTHOPAEDIC      foot    HX TONSIL AND ADENOIDECTOMY  1960     Family History   Problem Relation Age of Onset   Jess Feliz Cancer Mother     COPD Mother    Jessterrance Feliz Cancer Father         mesothelioma    Arthritis-rheumatoid Sister      Social History     Tobacco Use    Smoking status: Never Smoker    Smokeless tobacco: Never Used   Substance Use Topics    Alcohol use: No     Alcohol/week: 0.0 standard drinks             Specific concerns today: her  has been in hospital since Dec . 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 In facility in [address] [facility]. Review of Systems  Pertinent items are noted in HPI. Objective:   Blood pressure 100/62, pulse 62, temperature 97.8 °F (36.6 °C), temperature source Oral, resp. rate 20, height 5' 3\" (1.6 m), weight 188 lb 3.2 oz (85.4 kg), SpO2 97 %.    Physical Examination:   General appearance - alert, well appearing, and in no distress and overweight  Mental status - alert, oriented to person, place, and time  Ears - bilateral TM's and external ear canals normal  Nose - normal and patent, no erythema, discharge or polyps  Mouth - mucous membranes moist, pharynx normal without lesions  Neck - supple, no significant adenopathy  Chest - clear to auscultation, no wheezes, rales or rhonchi, symmetric air entry  Heart - normal rate, regular rhythm, normal S1, S2, no murmurs, rubs, clicks or gallops  Abdomen - soft, nontender, nondistended, no masses or organomegaly  Back exam - full range of motion, no tenderness, palpable spasm or pain on motion  Neurological - alert, oriented, normal speech, no focal findings or movement disorder noted   Diabetic foot exam:     Left Foot:   Visual Exam: degenerative MTP   Pulse DP: 2+ (normal)   Filament test: normal sensation          Right Foot:   Visual Exam: normal   Pulse DP: 2+ (normal)   Filament test: normal sensation          Assessment/Plan:   Well adult exam  lose weight, increase physical activity, follow low fat diet, follow low salt diet, routine labs ordered    ICD-10-CM ICD-9-CM    1. Well woman exam (no gynecological exam) Z00.00 V70.0 CBC WITH AUTOMATED DIFF      METABOLIC PANEL, COMPREHENSIVE      LIPID PANEL      HEMOGLOBIN A1C WITH EAG      TSH 3RD GENERATION      T4, FREE    [V70.0]   2. Type 2 diabetes mellitus without complication, without long-term current use of insulin (HCC) E11.9 250.00 AMB POC URINE, MICROALBUMIN, SEMIQUANT (3 RESULTS)      HM DIABETES FOOT EXAM   3. Vitamin D deficiency E55.9 268.9 VITAMIN D, 25 HYDROXY   4. Need for shingles vaccine Z23 V04.89 varicella-zoster recombinant, PF, (SHINGRIX, PF,) 50 mcg/0.5 mL susr injection   5. Encounter for immunization Z23 V03.89 TETANUS AND DIPHTHERIA TOXOIDS (TD) ADSORBED, PRES.  FREE, IN INDIVIDS. >=7, IM      VT IMMUNIZ ADMIN,1 SINGLE/COMB VAC/TOXOID

## 2019-08-22 NOTE — PROGRESS NOTES
Identified pt with two pt identifiers(name and ). Chief Complaint   Patient presents with    Physical    Stress     her  has been in hospital since dec - he is on respirator - he is in [facility] now        Health Maintenance Due   Topic    Shingrix Vaccine Age 50> (1 of 2)    Pneumococcal 0-64 years (1 of 1 - PPSV23)    DTaP/Tdap/Td series (2 - Td)    HEMOGLOBIN A1C Q6M     PAP AKA CERVICAL CYTOLOGY     FOOT EXAM Q1     MICROALBUMIN Q1     LIPID PANEL Q1        Wt Readings from Last 3 Encounters:   19 188 lb 3.2 oz (85.4 kg)   18 187 lb (84.8 kg)   18 190 lb (86.2 kg)     Temp Readings from Last 3 Encounters:   19 97.8 °F (36.6 °C) (Oral)   18 97.6 °F (36.4 °C) (Oral)   18 98.2 °F (36.8 °C) (Oral)     BP Readings from Last 3 Encounters:   19 100/62   18 108/70   18 110/70     Pulse Readings from Last 3 Encounters:   19 62   18 75   18 82         Learning Assessment:  :     Learning Assessment 2018 1/15/2014   PRIMARY LEARNER Patient Patient   HIGHEST LEVEL OF EDUCATION - PRIMARY LEARNER  > 4 YEARS OF COLLEGE > 4 YEARS OF COLLEGE   BARRIERS PRIMARY LEARNER NONE NONE   CO-LEARNER CAREGIVER No No   PRIMARY LANGUAGE ENGLISH ENGLISH   LEARNER PREFERENCE PRIMARY DEMONSTRATION DEMONSTRATION   ANSWERED BY self self   RELATIONSHIP SELF SELF       Depression Screening:  :     3 most recent PHQ Screens 2018   Little interest or pleasure in doing things Not at all   Feeling down, depressed, irritable, or hopeless Not at all   Total Score PHQ 2 0       Fall Risk Assessment:  :     Fall Risk Assessment, last 12 mths 2018   Able to walk? Yes   Fall in past 12 months? No       Abuse Screening:  :     Abuse Screening Questionnaire 2018   Do you ever feel afraid of your partner? N - N N   Are you in a relationship with someone who physically or mentally threatens you?  N N N N   Is it safe for you to go home? Alyssa Garcia       Coordination of Care Questionnaire:  :     1) Have you been to an emergency room, urgent care clinic since your last visit? no   Hospitalized since your last visit? no             2) Have you seen or consulted any other health care providers outside of [address] [place] [address] since your last visit? Ortho for left index finger   (Include any pap smears or colon screenings in this section.)    3) Do you have an Advance Directive on file? yes  Are you interested in receiving information about Advance Directives? no    Reviewed record in preparation for visit and have obtained necessary documentation. Medication reconciliation up to date and corrected with patient at this time.      Results for orders placed or performed in visit on 08/22/19   AMB POC URINE, MICROALBUMIN, SEMIQUANT (3 RESULTS)   Result Value Ref Range    ALBUMIN, URINE POC 10 Negative mg/L    CREATININE, URINE  mg/dL    Microalbumin/creat ratio (POC) <30 <30 MG/G

## 2019-08-23 LAB
25(OH)D3+25(OH)D2 SERPL-MCNC: 60.4 NG/ML (ref 30–100)
ALBUMIN SERPL-MCNC: 4.5 G/DL (ref 3.6–4.8)
ALBUMIN/GLOB SERPL: 1.8 {RATIO} (ref 1.2–2.2)
ALP SERPL-CCNC: 64 IU/L (ref 39–117)
ALT SERPL-CCNC: 70 IU/L (ref 0–32)
AST SERPL-CCNC: 50 IU/L (ref 0–40)
BASOPHILS # BLD AUTO: 0.1 X10E3/UL (ref 0–0.2)
BASOPHILS NFR BLD AUTO: 1 %
BILIRUB SERPL-MCNC: 0.4 MG/DL (ref 0–1.2)
BUN SERPL-MCNC: 10 MG/DL (ref 8–27)
BUN/CREAT SERPL: 13 (ref 12–28)
CALCIUM SERPL-MCNC: 10 MG/DL (ref 8.7–10.3)
CHLORIDE SERPL-SCNC: 96 MMOL/L (ref 96–106)
CHOLEST SERPL-MCNC: 168 MG/DL (ref 100–199)
CO2 SERPL-SCNC: 22 MMOL/L (ref 20–29)
CREAT SERPL-MCNC: 0.79 MG/DL (ref 0.57–1)
EOSINOPHIL # BLD AUTO: 0.1 X10E3/UL (ref 0–0.4)
EOSINOPHIL NFR BLD AUTO: 2 %
ERYTHROCYTE [DISTWIDTH] IN BLOOD BY AUTOMATED COUNT: 12.8 % (ref 12.3–15.4)
EST. AVERAGE GLUCOSE BLD GHB EST-MCNC: 123 MG/DL
GLOBULIN SER CALC-MCNC: 2.5 G/DL (ref 1.5–4.5)
GLUCOSE SERPL-MCNC: 90 MG/DL (ref 65–99)
HBA1C MFR BLD: 5.9 % (ref 4.8–5.6)
HCT VFR BLD AUTO: 42.2 % (ref 34–46.6)
HDLC SERPL-MCNC: 54 MG/DL
HGB BLD-MCNC: 13.9 G/DL (ref 11.1–15.9)
IMM GRANULOCYTES # BLD AUTO: 0 X10E3/UL (ref 0–0.1)
IMM GRANULOCYTES NFR BLD AUTO: 0 %
INTERPRETATION, 910389: NORMAL
LDLC SERPL CALC-MCNC: 76 MG/DL (ref 0–99)
LYMPHOCYTES # BLD AUTO: 2.3 X10E3/UL (ref 0.7–3.1)
LYMPHOCYTES NFR BLD AUTO: 32 %
MCH RBC QN AUTO: 29.8 PG (ref 26.6–33)
MCHC RBC AUTO-ENTMCNC: 32.9 G/DL (ref 31.5–35.7)
MCV RBC AUTO: 90 FL (ref 79–97)
MONOCYTES # BLD AUTO: 0.5 X10E3/UL (ref 0.1–0.9)
MONOCYTES NFR BLD AUTO: 8 %
NEUTROPHILS # BLD AUTO: 4.1 X10E3/UL (ref 1.4–7)
NEUTROPHILS NFR BLD AUTO: 57 %
PLATELET # BLD AUTO: 267 X10E3/UL (ref 150–450)
POTASSIUM SERPL-SCNC: 5.1 MMOL/L (ref 3.5–5.2)
PROT SERPL-MCNC: 7 G/DL (ref 6–8.5)
RBC # BLD AUTO: 4.67 X10E6/UL (ref 3.77–5.28)
SODIUM SERPL-SCNC: 140 MMOL/L (ref 134–144)
T4 FREE SERPL-MCNC: 1.71 NG/DL (ref 0.82–1.77)
TRIGL SERPL-MCNC: 192 MG/DL (ref 0–149)
TSH SERPL DL<=0.005 MIU/L-ACNC: 1.03 UIU/ML (ref 0.45–4.5)
VLDLC SERPL CALC-MCNC: 38 MG/DL (ref 5–40)
WBC # BLD AUTO: 7.1 X10E3/UL (ref 3.4–10.8)

## 2019-08-26 NOTE — PROGRESS NOTES
Labs look good. Normal blood counts. Sugar is under good control. Good cholesterol numbers. Normal thyroid. Vit D is at desired range. Liver function tests remain elevated. This is likely from fatty liver. Try to improve diet and weight control.

## 2019-09-13 DIAGNOSIS — E78.00 HYPERCHOLESTEROLEMIA: ICD-10-CM

## 2019-09-13 DIAGNOSIS — E03.9 ACQUIRED HYPOTHYROIDISM: ICD-10-CM

## 2019-09-14 RX ORDER — LEVOTHYROXINE SODIUM 175 UG/1
175 TABLET ORAL
Qty: 90 TAB | Refills: 1 | Status: SHIPPED | OUTPATIENT
Start: 2019-09-14 | End: 2020-02-11 | Stop reason: SDUPTHER

## 2019-09-14 RX ORDER — EZETIMIBE 10 MG/1
10 TABLET ORAL DAILY
Qty: 90 TAB | Refills: 1 | Status: SHIPPED | OUTPATIENT
Start: 2019-09-14 | End: 2020-02-11 | Stop reason: SDUPTHER

## 2019-09-20 ENCOUNTER — TELEPHONE (OUTPATIENT)
Dept: FAMILY MEDICINE CLINIC | Age: 64
End: 2019-09-20

## 2019-09-20 DIAGNOSIS — K21.9 GASTROESOPHAGEAL REFLUX DISEASE, ESOPHAGITIS PRESENCE NOT SPECIFIED: ICD-10-CM

## 2019-09-20 RX ORDER — PHENOL/SODIUM PHENOLATE
20 AEROSOL, SPRAY (ML) MUCOUS MEMBRANE DAILY
Qty: 90 TAB | Refills: 1 | Status: SHIPPED | OUTPATIENT
Start: 2019-09-20

## 2019-09-20 NOTE — TELEPHONE ENCOUNTER
----- Message from Earline Cervantes LPN sent at 2/40/9178  1:16 PM EDT -----  Regarding: FW: Prescription Question  Contact: [phone]      ----- Message -----  From: Eden Smith  Sent: 9/19/2019  11:23 PM EDT  To: Renate Maxwell Nurse Pool  Subject: Prescription Question                            Please refil prescriptuon for Omeprazole [facility].  Thanks

## 2019-10-04 RX ORDER — LISINOPRIL 10 MG/1
5 TABLET ORAL DAILY
Qty: 90 TAB | Refills: 3 | Status: SHIPPED | OUTPATIENT
Start: 2019-10-04 | End: 2020-09-14

## 2019-11-27 DIAGNOSIS — E11.9 TYPE 2 DIABETES MELLITUS WITHOUT COMPLICATION, WITHOUT LONG-TERM CURRENT USE OF INSULIN (HCC): ICD-10-CM

## 2019-11-27 RX ORDER — LIRAGLUTIDE 6 MG/ML
INJECTION SUBCUTANEOUS
Qty: 27 ML | Refills: 1 | Status: SHIPPED | OUTPATIENT
Start: 2019-11-27 | End: 2020-05-15 | Stop reason: SDUPTHER

## 2019-12-04 DIAGNOSIS — E11.9 TYPE 2 DIABETES MELLITUS WITHOUT COMPLICATION, WITHOUT LONG-TERM CURRENT USE OF INSULIN (HCC): ICD-10-CM

## 2019-12-05 RX ORDER — PEN NEEDLE, DIABETIC 31 GX3/16"
NEEDLE, DISPOSABLE MISCELLANEOUS
Qty: 90 PEN NEEDLE | Refills: 4 | Status: SHIPPED | OUTPATIENT
Start: 2019-12-05 | End: 2020-12-15

## 2019-12-11 DIAGNOSIS — M19.90 ARTHRITIS: ICD-10-CM

## 2019-12-12 RX ORDER — TRAMADOL HYDROCHLORIDE 50 MG/1
50 TABLET ORAL
Qty: 180 TAB | Refills: 0 | Status: SHIPPED | OUTPATIENT
Start: 2019-12-12 | End: 2020-01-30 | Stop reason: SDUPTHER

## 2020-01-17 RX ORDER — METFORMIN HYDROCHLORIDE 500 MG/1
TABLET, EXTENDED RELEASE ORAL
Qty: 360 TAB | Refills: 3 | Status: SHIPPED | OUTPATIENT
Start: 2020-01-17 | End: 2020-12-30

## 2020-01-17 RX ORDER — VENLAFAXINE HYDROCHLORIDE 75 MG/1
CAPSULE, EXTENDED RELEASE ORAL
Qty: 90 CAP | Refills: 3 | Status: SHIPPED | OUTPATIENT
Start: 2020-01-17 | End: 2020-11-23

## 2020-01-17 RX ORDER — COLESEVELAM 180 1/1
TABLET ORAL
Qty: 540 TAB | Refills: 3 | Status: SHIPPED | OUTPATIENT
Start: 2020-01-17 | End: 2021-05-26 | Stop reason: SDUPTHER

## 2020-01-30 DIAGNOSIS — M19.90 ARTHRITIS: ICD-10-CM

## 2020-01-30 RX ORDER — TRAMADOL HYDROCHLORIDE 50 MG/1
50 TABLET ORAL
Qty: 180 TAB | Refills: 0 | Status: SHIPPED | OUTPATIENT
Start: 2020-01-30 | End: 2020-02-17 | Stop reason: SDUPTHER

## 2020-02-05 ENCOUNTER — TELEPHONE (OUTPATIENT)
Dept: FAMILY MEDICINE CLINIC | Age: 65
End: 2020-02-05

## 2020-02-05 DIAGNOSIS — Z85.3 HX: BREAST CANCER: Primary | ICD-10-CM

## 2020-02-05 NOTE — TELEPHONE ENCOUNTER
----- Message from Feroz Yu LPN sent at 6/2/8417  3:50 PM EST -----  Regarding: FW: Non-Urgent Medical Question  Contact: [phone]    ----- Message -----  From: Chet Duty  Sent: 2/5/2020   3:33 PM EST  To: [facility] Nurse Lerma  Subject: Non-Urgent Medical Question                      Hello, can you please send a prescription to [facility], fax [phone], for my prosthesis and bras? Thank you!

## 2020-02-11 DIAGNOSIS — M19.90 ARTHRITIS: ICD-10-CM

## 2020-02-11 DIAGNOSIS — E78.00 HYPERCHOLESTEROLEMIA: ICD-10-CM

## 2020-02-11 DIAGNOSIS — E03.9 ACQUIRED HYPOTHYROIDISM: ICD-10-CM

## 2020-02-11 RX ORDER — TRAMADOL HYDROCHLORIDE 50 MG/1
50 TABLET ORAL
Qty: 180 TAB | Refills: 0 | OUTPATIENT
Start: 2020-02-11 | End: 2020-05-11

## 2020-02-11 RX ORDER — VENLAFAXINE HYDROCHLORIDE 75 MG/1
CAPSULE, EXTENDED RELEASE ORAL
Qty: 90 CAP | Refills: 3 | OUTPATIENT
Start: 2020-02-11

## 2020-02-11 RX ORDER — EZETIMIBE 10 MG/1
10 TABLET ORAL DAILY
Qty: 90 TAB | Refills: 1 | Status: SHIPPED | OUTPATIENT
Start: 2020-02-11 | End: 2020-08-24

## 2020-02-11 RX ORDER — LEVOTHYROXINE SODIUM 175 UG/1
175 TABLET ORAL
Qty: 90 TAB | Refills: 1 | Status: SHIPPED | OUTPATIENT
Start: 2020-02-11 | End: 2020-05-29 | Stop reason: DRUGHIGH

## 2020-02-17 ENCOUNTER — TELEPHONE (OUTPATIENT)
Dept: FAMILY MEDICINE CLINIC | Age: 65
End: 2020-02-17

## 2020-02-17 DIAGNOSIS — M19.90 ARTHRITIS: ICD-10-CM

## 2020-02-17 RX ORDER — TRAMADOL HYDROCHLORIDE 50 MG/1
50 TABLET ORAL
Qty: 60 TAB | Refills: 2 | Status: SHIPPED | OUTPATIENT
Start: 2020-02-17 | End: 2020-12-01

## 2020-02-17 NOTE — TELEPHONE ENCOUNTER
Kanchan please refax the order for bra and prosthesis to [facility]. You sent it 2/6 but they did not get it.

## 2020-02-17 NOTE — TELEPHONE ENCOUNTER
----- Message from Alberto Armijo LPN sent at 9/05/0923  3:31 PM EST -----  Regarding: FW: Prescription Question  Contact: [phone]    ----- Message -----  From: Jorge Casillas  Sent: 2/17/2020   3:16 PM EST  To: [facility] Nurse Pool  Subject: Prescription Question                            Can you please call in the Tramadol to the [facility] and I can use [facility]? My insurance will not cover it.   Thank you

## 2020-02-17 NOTE — TELEPHONE ENCOUNTER
----- Message from Dennis Garcia LPN sent at 9/19/4694  1:24 PM EST -----  Regarding: FW: Prescription Question  Contact: [phone]    ----- Message -----  From: Dany Morley  Sent: 2/17/2020   1:15 PM EST  To: [facility] Nurse Pool  Subject: Prescription Question                            [facility] said they have not received the fax for my prescription for my prostesis and bras. Are you faxing it to [phone] Can you please call them [phone] I have an appointment next week.

## 2020-02-20 ENCOUNTER — TELEPHONE (OUTPATIENT)
Dept: FAMILY MEDICINE CLINIC | Age: 65
End: 2020-02-20

## 2020-02-20 NOTE — TELEPHONE ENCOUNTER
Pharmacy needs to have the tramadol written for 7 days only as pt has not been on it for over a year  Then a script can be written for 30 days    Call [facility] pharmacy if more details needed at [phone]

## 2020-02-21 NOTE — TELEPHONE ENCOUNTER
I spoke with pharmacist at [facility] and over ride of quantity limit of Tramadol given.

## 2020-05-15 ENCOUNTER — VIRTUAL VISIT (OUTPATIENT)
Dept: FAMILY MEDICINE CLINIC | Age: 65
End: 2020-05-15

## 2020-05-15 DIAGNOSIS — E03.9 ACQUIRED HYPOTHYROIDISM: ICD-10-CM

## 2020-05-15 DIAGNOSIS — Z79.899 ENCOUNTER FOR LONG-TERM CURRENT USE OF MEDICATION: ICD-10-CM

## 2020-05-15 DIAGNOSIS — E11.9 TYPE 2 DIABETES MELLITUS WITHOUT COMPLICATION, WITHOUT LONG-TERM CURRENT USE OF INSULIN (HCC): ICD-10-CM

## 2020-05-15 DIAGNOSIS — E78.00 HYPERCHOLESTEROLEMIA: ICD-10-CM

## 2020-05-15 DIAGNOSIS — E11.9 TYPE 2 DIABETES MELLITUS WITHOUT COMPLICATION, WITHOUT LONG-TERM CURRENT USE OF INSULIN (HCC): Primary | ICD-10-CM

## 2020-05-15 DIAGNOSIS — F43.21 GRIEF REACTION: ICD-10-CM

## 2020-05-15 RX ORDER — LIRAGLUTIDE 6 MG/ML
INJECTION SUBCUTANEOUS
Qty: 27 ML | Refills: 1 | Status: SHIPPED | OUTPATIENT
Start: 2020-05-15 | End: 2020-12-15

## 2020-05-15 RX ORDER — CETIRIZINE HCL 10 MG
10 TABLET ORAL DAILY
COMMUNITY

## 2020-05-15 NOTE — PROGRESS NOTES
Linda Orozco is a 62 y.o. female who was seen by synchronous (real-time) audio-video technology on 5/15/2020. Consent: Linda Orozco, who was seen by synchronous (real-time) audio-video technology, and/or her healthcare decision maker, is aware that this patient-initiated, Telehealth encounter on 5/15/2020 is a billable service, with coverage as determined by her insurance carrier. She is aware that she may receive a bill and has provided verbal consent to proceed: Yes. Assessment & Plan:   Diagnoses and all orders for this visit:    1. Type 2 diabetes mellitus without complication, without long-term current use of insulin (HCC)  -     liraglutide (Victoza 3-Cachorro) 0.6 mg/0.1 mL (18 mg/3 mL) pnij; INJECT 1.8 MG UNDER THE    SKIN ONCE DAILY  -     HEMOGLOBIN A1C WITH EAG; Future  -     MICROALBUMIN, UR, RAND W/ MICROALB/CREAT RATIO; Future    2. Hypercholesterolemia  -     LIPID PANEL; Future    3. Acquired hypothyroidism  -     TSH 3RD GENERATION; Future  -     T4, FREE; Future    4. Encounter for long-term current use of medication  -     CBC WITH AUTOMATED DIFF; Future  -     METABOLIC PANEL, COMPREHENSIVE; Future    5. Grief reaction    fax lab orders to [facility]ero across [facility]. She had mammogram at [facility] a few weeks ago. Med refill sent. FU 6 months      I spent at least 16 minutes on this visit with this established patient. [phone]  Subjective: Linda Orozco is a 62 y.o. female who was seen for Diabetes and Stress (her  passed nov and mother in dec.)      Prior to Admission medications    Medication Sig Start Date End Date Taking? Authorizing Provider   liraglutide (Victoza 3-Cachorro) 0.6 mg/0.1 mL (18 mg/3 mL) pnij INJECT 1.8 MG UNDER THE    SKIN ONCE DAILY 0/28/52  Yes Carlos Coyle MD   cetirizine (ZyrTEC) 10 mg tablet Take 10 mg by mouth daily. Yes Provider, Historical   levothyroxine (SYNTHROID) 175 mcg tablet Take 1 Tab by mouth Daily (before breakfast).  2/11/20  Yes Leonie Shepard MD   ezetimibe (ZETIA) 10 mg tablet Take 1 Tab by mouth daily. 9/27/02  Yes Leonie Shepard MD   metFORMIN ER (GLUCOPHAGE XR) 500 mg tablet TAKE 2 TABLETS IN THE      MORNING AND 2 TABLETS IN   THE EVENING 0/21/22  Yes Leonie Shepard MD   venlafaxine-SR [facility] 75 mg capsule TAKE 1 CAPSULE DAILY 7/51/77  Yes Leonie Shepard MD   [facility]) 625 mg tablet TAKE 3 TABLETS TWICE A DAY WITH MEALS 3/04/78  Yes Leonie Shepard MD   Insulin Needles, Disposable, (LACY PEN NEEDLE) 32 gauge x 5/32\" ndle Use for Victoza pen one daily. 17/2/31  Yes Leonie Shepard MD   lisinopril (PRINIVIL, ZESTRIL) 10 mg tablet Take 0.5 Tabs by mouth daily. 70/1/33  Yes Leonie Shepard MD   Omeprazole delayed release (PRILOSEC D/R) 20 mg tablet Take 1 Tab by mouth daily. 0/84/94  Yes Leonie Shepard MD   cholecalciferol, vitamin D3, (VITAMIN D3) 4,000 unit cap Take 4,000 Units by mouth daily. Patient taking differently: Take 5,000 Units by mouth daily. 5/30/08  Yes Leonie Shepard MD   diclofenac EC (VOLTAREN) 75 mg EC tablet TAKE 1 TABLET TWICE A DAY AS NEEDED 5/46/41  Yes Leonie Shepard MD   ferrous sulfate 325 mg (65 mg iron) tablet Take  by mouth Daily (before breakfast). Yes Provider, Historical   fluticasone (FLONASE) 50 mcg/actuation nasal spray USE 2 SPRAYS TO BOTH NOSTRIL ONCE DAILY 9/14/27  Yes Leonie Shepard MD   milk thistle 500 mg cap Take 500 mg by mouth daily. 2/14/13  Yes    glucose blood VI test strips (ONE TOUCH ULTRA TEST) strip Use as directed 2/61/86  Yes Leonie Shepard MD   aspirin delayed-release 81 mg tablet Take  by mouth daily. Yes Provider, Historical   MULTIVITAMIN PO Take  by mouth. Yes Provider, Historical   cyanocobalamin (VITAMIN B-12) 2,500 mcg Subl Take 2,500 mg by mouth daily. 12/10/10  Yes Provider, Historical   CALCIUM CARBONATE/VITAMIN D3 (CALCIUM 600 + D,3, PO) Take  by mouth. Yes Provider, Historical   magnesium 250 mg Tab Take  by mouth.    Yes Provider, Historical   OMEGA-3 FATTY ACIDS (OMEGA 3 PO) Take 4 Tabs by mouth daily. 4/1/11  Yes Provider, Historical   traMADol (ULTRAM) 50 mg tablet Take 1 Tab by mouth every eight (8) hours as needed for Pain for up to 90 days. Max Daily Amount: 150 mg. Indications: pain 2/17/20 2/52/36  Ian Sandoval MD   carisoprodol (SOMA) 250 mg tablet TAKE 1 TABLET BY MOUTH 4 TIMES A DAY AS NEEDED FOR MUSCLE SPASM 1/47/11   Ian Sandoval MD   albuterol (PROVENTIL HFA, VENTOLIN HFA, PROAIR HFA) 90 mcg/actuation inhaler Take 2 Puffs by inhalation every four (4) hours as needed for Wheezing. PRO AIR HFA 9/37/21   Ian Sandoval MD   mometasone (NASONEX) 50 mcg/actuation nasal spray 2 Sprays daily. Provider, Historical   clotrimazole-betamethasone (LOTRISONE) topical cream Apply to rash twice a day. 46/46/31   Ian Sandoval MD     Allergies   Allergen Reactions    Crestor [Rosuvastatin] Other (comments)     Increased CK    Simvastatin Myalgia    Bee Pollen Itching    Mite Extract Itching       Patient Active Problem List    Diagnosis Date Noted    [facility] 12/10/2010    Hypercholesterolemia 12/10/2010    Hypothyroidism 12/10/2010    HX: breast cancer 12/10/2010    Environmental allergies 12/10/2010    Arthritis 12/10/2010    Sleep apnea 12/10/2010    Fatty liver 12/10/2010     Current Outpatient Medications   Medication Sig Dispense Refill    liraglutide (Victoza 3-Cachorro) 0.6 mg/0.1 mL (18 mg/3 mL) pnij INJECT 1.8 MG UNDER THE    SKIN ONCE DAILY 27 mL 1    cetirizine (ZyrTEC) 10 mg tablet Take 10 mg by mouth daily.  levothyroxine (SYNTHROID) 175 mcg tablet Take 1 Tab by mouth Daily (before breakfast). 90 Tab 1    ezetimibe (ZETIA) 10 mg tablet Take 1 Tab by mouth daily.  90 Tab 1    metFORMIN ER (GLUCOPHAGE XR) 500 mg tablet TAKE 2 TABLETS IN THE      MORNING AND 2 TABLETS IN   THE EVENING 360 Tab 3    venlafaxine-SR (EFFEXOR-XR) 75 mg capsule TAKE 1 CAPSULE DAILY 90 Cap 3    colesevelam (WELCHOL) 625 mg tablet TAKE 3 TABLETS TWICE A DAY WITH MEALS 540 Tab 3    Insulin Needles, Disposable, (LACY PEN NEEDLE) 32 gauge x 5/32\" ndle Use for Victoza pen one daily. 90 Pen Needle 4    lisinopril (PRINIVIL, ZESTRIL) 10 mg tablet Take 0.5 Tabs by mouth daily. 90 Tab 3    Omeprazole delayed release (PRILOSEC D/R) 20 mg tablet Take 1 Tab by mouth daily. 90 Tab 1    cholecalciferol, vitamin D3, (VITAMIN D3) 4,000 unit cap Take 4,000 Units by mouth daily. (Patient taking differently: Take 5,000 Units by mouth daily.) 90 Cap 5    diclofenac EC (VOLTAREN) 75 mg EC tablet TAKE 1 TABLET TWICE A DAY AS NEEDED 180 Tab 1    ferrous sulfate 325 mg (65 mg iron) tablet Take  by mouth Daily (before breakfast).  fluticasone (FLONASE) 50 mcg/actuation nasal spray USE 2 SPRAYS TO BOTH NOSTRIL ONCE DAILY 16 g 5    milk thistle 500 mg cap Take 500 mg by mouth daily. 60 Cap 0    glucose blood VI test strips (ONE TOUCH ULTRA TEST) strip Use as directed 1 Package 11    aspirin delayed-release 81 mg tablet Take  by mouth daily.  MULTIVITAMIN PO Take  by mouth.  cyanocobalamin (VITAMIN B-12) 2,500 mcg Subl Take 2,500 mg by mouth daily.  CALCIUM CARBONATE/VITAMIN D3 (CALCIUM 600 + D,3, PO) Take  by mouth.  magnesium 250 mg Tab Take  by mouth.  OMEGA-3 FATTY ACIDS (OMEGA 3 PO) Take 4 Tabs by mouth daily.  traMADol (ULTRAM) 50 mg tablet Take 1 Tab by mouth every eight (8) hours as needed for Pain for up to 90 days. Max Daily Amount: 150 mg. Indications: pain 60 Tab 2    carisoprodol (SOMA) 250 mg tablet TAKE 1 TABLET BY MOUTH 4 TIMES A DAY AS NEEDED FOR MUSCLE SPASM 90 Tab 1    albuterol (PROVENTIL HFA, VENTOLIN HFA, PROAIR HFA) 90 mcg/actuation inhaler Take 2 Puffs by inhalation every four (4) hours as needed for Wheezing. PRO AIR HFA 3 Inhaler 1    mometasone (NASONEX) 50 mcg/actuation nasal spray 2 Sprays daily.       clotrimazole-betamethasone (LOTRISONE) topical cream Apply to rash twice a day. 45 g 3     Allergies   Allergen Reactions    Crestor [Rosuvastatin] Other (comments)     Increased CK    Simvastatin Myalgia    Bee Pollen Itching    Mite Extract Itching     Past Medical History:   Diagnosis Date    Arthritis 12/10/2010    [facility]     removed 2/2/09    Diabetes ([facility]     Fatty liver 12/10/2010    Hypercholesterolemia     Sleep apnea 12/10/2010    Thyroid disease      Past Surgical History:   Procedure Laterality Date    ENDOSCOPY, COLON, DIAGNOSTIC  1/2009    HX BREAST LUMPECTOMY  2/2/09    reincision    HX CATARACT REMOVAL Right 11/2016    HX CATARACT REMOVAL Left 11/2016    HX GYN  2010    cervical polypectomy    HX HEENT      HX LITHOTRIPSY  1994    HX ORTHOPAEDIC      foot    HX TONSIL AND ADENOIDECTOMY  1960     Family History   Problem Relation Age of Onset    Cancer Mother     COPD Mother     Cancer Father         mesothelioma    Arthritis-rheumatoid Sister      Social History     Tobacco Use    Smoking status: Never Smoker    Smokeless tobacco: Never Used   Substance Use Topics    Alcohol use: No     Alcohol/week: 0.0 standard drinks       ROS  Grieving loss of  and her mother last year. Doing fine. Her son moved back in with her. Good support system. Due for labs. Objective: There were no vitals taken for this visit. General: alert, cooperative, no distress   Mental  status: normal mood, behavior, speech, dress, motor activity, and thought processes, able to follow commands   HENT: NCAT   Neck: no visualized mass   Resp: no respiratory distress   Neuro: no gross deficits   Skin: no discoloration or lesions of concern on visible areas   Psychiatric: normal affect, consistent with stated mood, no evidence of hallucinations     Additional exam findings: We discussed the expected course, resolution and complications of the diagnosis(es) in detail.   Medication risks, benefits, costs, interactions, and alternatives were discussed as indicated. I advised her to contact the office if her condition worsens, changes or fails to improve as anticipated. She expressed understanding with the diagnosis(es) and plan. Lora Miranda is a 62 y.o. female who was evaluated by a video visit encounter for concerns as above. Patient identification was verified prior to start of the visit. A caregiver was present when appropriate. Due to this being a TeleHealth encounter (During [facility] public health emergency), evaluation of the following organ systems was limited: Vitals/Constitutional/EENT/Resp/CV/GI//MS/Neuro/Skin/Heme-Lymph-Imm. Pursuant to the emergency declaration under the [facility], [facility]5 waiver authority and the [employer] and [employer] Act, this Virtual  Visit was conducted, with patient's (and/or legal guardian's) consent, to reduce the patient's risk of exposure to COVID-19 and provide necessary medical care. Services were provided through a video synchronous discussion virtually to substitute for in-person clinic visit. This visit was completed virtually using [employer] ME. Patient was instructed to follow up as needed. POS Patient home. Provider was at the office.       Tilden Alpers, MD

## 2020-05-15 NOTE — PROGRESS NOTES
Identified pt with two pt identifiers(name and ). Chief Complaint   Patient presents with    Diabetes    Stress     her  passed nov and mother in [de-identified]. Health Maintenance Due   Topic    Shingrix Vaccine Age 49> (1 of 2)    Pneumococcal 0-64 years (1 of 1 - PPSV23)    Breast Cancer Screen Mammogram     PAP AKA CERVICAL CYTOLOGY        Wt Readings from Last 3 Encounters:   19 188 lb 3.2 oz (85.4 kg)   18 187 lb (84.8 kg)   18 190 lb (86.2 kg)     Temp Readings from Last 3 Encounters:   19 97.8 °F (36.6 °C) (Oral)   18 97.6 °F (36.4 °C) (Oral)   18 98.2 °F (36.8 °C) (Oral)     BP Readings from Last 3 Encounters:   19 100/62   18 108/70   18 110/70     Pulse Readings from Last 3 Encounters:   19 62   18 75   18 82         Learning Assessment:  :     Learning Assessment 2018 1/15/2014   PRIMARY LEARNER Patient Patient   HIGHEST LEVEL OF EDUCATION - PRIMARY LEARNER  > 4 YEARS OF COLLEGE > 4 YEARS OF COLLEGE   BARRIERS PRIMARY LEARNER NONE NONE   CO-LEARNER CAREGIVER No No   PRIMARY LANGUAGE ENGLISH ENGLISH   LEARNER PREFERENCE PRIMARY DEMONSTRATION DEMONSTRATION   ANSWERED BY self self   RELATIONSHIP SELF SELF       Depression Screening:  :     3 most recent PHQ Screens 5/15/2020   Little interest or pleasure in doing things Not at all   Feeling down, depressed, irritable, or hopeless Not at all   Total Score PHQ 2 0       Fall Risk Assessment:  :     Fall Risk Assessment, last 12 mths 5/15/2020   Able to walk? Yes   Fall in past 12 months? No       Abuse Screening:  :     Abuse Screening Questionnaire 5/15/2020 2018 2017 2016 2015   Do you ever feel afraid of your partner? N N - N N   Are you in a relationship with someone who physically or mentally threatens you? N N N N N   Is it safe for you to go home?  Y Y N Y Y       Coordination of Care Questionnaire:  :     1) Have you been to an emergency room, urgent care clinic since your last visit? no   Hospitalized since your last visit? no             2) Have you seen or consulted any other health care providers outside of [address] [place] [address] since your last visit? no  (Include any pap smears or colon screenings in this section.)    3) Do you have an Advance Directive on file? yes  Are you interested in receiving information about Advance Directives? no    Patient is accompanied by self I have received verbal consent from Leo Both to discuss any/all medical information while they are present in the room. Reviewed record in preparation for visit and have obtained necessary documentation. Medication reconciliation up to date and corrected with patient at this time.

## 2020-05-28 LAB
ALBUMIN SERPL-MCNC: 4.3 G/DL (ref 3.8–4.8)
ALBUMIN/CREAT UR: 6 MG/G CREAT (ref 0–29)
ALBUMIN/GLOB SERPL: 2 {RATIO} (ref 1.2–2.2)
ALP SERPL-CCNC: 59 IU/L (ref 39–117)
ALT SERPL-CCNC: 43 IU/L (ref 0–32)
AST SERPL-CCNC: 29 IU/L (ref 0–40)
BASOPHILS # BLD AUTO: 0.1 X10E3/UL (ref 0–0.2)
BASOPHILS NFR BLD AUTO: 1 %
BILIRUB SERPL-MCNC: 0.3 MG/DL (ref 0–1.2)
BUN SERPL-MCNC: 10 MG/DL (ref 8–27)
BUN/CREAT SERPL: 13 (ref 12–28)
CALCIUM SERPL-MCNC: 9.4 MG/DL (ref 8.7–10.3)
CHLORIDE SERPL-SCNC: 100 MMOL/L (ref 96–106)
CHOLEST SERPL-MCNC: 161 MG/DL (ref 100–199)
CO2 SERPL-SCNC: 22 MMOL/L (ref 20–29)
CREAT SERPL-MCNC: 0.79 MG/DL (ref 0.57–1)
CREAT UR-MCNC: 76.2 MG/DL
EOSINOPHIL # BLD AUTO: 0.2 X10E3/UL (ref 0–0.4)
EOSINOPHIL NFR BLD AUTO: 3 %
ERYTHROCYTE [DISTWIDTH] IN BLOOD BY AUTOMATED COUNT: 12.2 % (ref 11.7–15.4)
EST. AVERAGE GLUCOSE BLD GHB EST-MCNC: 123 MG/DL
GLOBULIN SER CALC-MCNC: 2.1 G/DL (ref 1.5–4.5)
GLUCOSE SERPL-MCNC: 102 MG/DL (ref 65–99)
HBA1C MFR BLD: 5.9 % (ref 4.8–5.6)
HCT VFR BLD AUTO: 40.1 % (ref 34–46.6)
HDLC SERPL-MCNC: 46 MG/DL
HGB BLD-MCNC: 13.4 G/DL (ref 11.1–15.9)
IMM GRANULOCYTES # BLD AUTO: 0 X10E3/UL (ref 0–0.1)
IMM GRANULOCYTES NFR BLD AUTO: 0 %
INTERPRETATION, 910389: NORMAL
LDLC SERPL CALC-MCNC: 74 MG/DL (ref 0–99)
LYMPHOCYTES # BLD AUTO: 3 X10E3/UL (ref 0.7–3.1)
LYMPHOCYTES NFR BLD AUTO: 41 %
Lab: NORMAL
MCH RBC QN AUTO: 30.1 PG (ref 26.6–33)
MCHC RBC AUTO-ENTMCNC: 33.4 G/DL (ref 31.5–35.7)
MCV RBC AUTO: 90 FL (ref 79–97)
MICROALBUMIN UR-MCNC: 4.9 UG/ML
MONOCYTES # BLD AUTO: 0.6 X10E3/UL (ref 0.1–0.9)
MONOCYTES NFR BLD AUTO: 8 %
NEUTROPHILS # BLD AUTO: 3.3 X10E3/UL (ref 1.4–7)
NEUTROPHILS NFR BLD AUTO: 47 %
PLATELET # BLD AUTO: 278 X10E3/UL (ref 150–450)
POTASSIUM SERPL-SCNC: 4.6 MMOL/L (ref 3.5–5.2)
PROT SERPL-MCNC: 6.4 G/DL (ref 6–8.5)
RBC # BLD AUTO: 4.45 X10E6/UL (ref 3.77–5.28)
SODIUM SERPL-SCNC: 138 MMOL/L (ref 134–144)
T4 FREE SERPL-MCNC: 1.13 NG/DL (ref 0.82–1.77)
TRIGL SERPL-MCNC: 207 MG/DL (ref 0–149)
TSH SERPL DL<=0.005 MIU/L-ACNC: 8.43 UIU/ML (ref 0.45–4.5)
VLDLC SERPL CALC-MCNC: 41 MG/DL (ref 5–40)
WBC # BLD AUTO: 7.2 X10E3/UL (ref 3.4–10.8)

## 2020-05-29 ENCOUNTER — TELEPHONE (OUTPATIENT)
Dept: FAMILY MEDICINE CLINIC | Age: 65
End: 2020-05-29

## 2020-05-29 DIAGNOSIS — E03.9 ACQUIRED HYPOTHYROIDISM: Primary | ICD-10-CM

## 2020-05-29 RX ORDER — LEVOTHYROXINE SODIUM 200 UG/1
200 TABLET ORAL
Qty: 90 TAB | Refills: 1 | Status: SHIPPED | OUTPATIENT
Start: 2020-05-29 | End: 2020-09-03 | Stop reason: SDUPTHER

## 2020-08-24 DIAGNOSIS — E03.9 ACQUIRED HYPOTHYROIDISM: ICD-10-CM

## 2020-08-24 DIAGNOSIS — E78.00 HYPERCHOLESTEROLEMIA: ICD-10-CM

## 2020-08-24 RX ORDER — EZETIMIBE 10 MG/1
TABLET ORAL
Qty: 90 TAB | Refills: 1 | Status: SHIPPED | OUTPATIENT
Start: 2020-08-24 | End: 2021-03-21 | Stop reason: SDUPTHER

## 2020-08-24 RX ORDER — LEVOTHYROXINE SODIUM 175 UG/1
TABLET ORAL
Qty: 90 TAB | Refills: 1 | OUTPATIENT
Start: 2020-08-24

## 2020-09-03 DIAGNOSIS — E03.9 ACQUIRED HYPOTHYROIDISM: ICD-10-CM

## 2020-09-04 RX ORDER — LEVOTHYROXINE SODIUM 200 UG/1
200 TABLET ORAL
Qty: 90 TAB | Refills: 0 | Status: SHIPPED | OUTPATIENT
Start: 2020-09-04 | End: 2020-11-23

## 2020-09-08 NOTE — TELEPHONE ENCOUNTER
Pt sent following message through [employer]:    George Fuentes   to Leodan Long MD          9/8/20 10:17 AM   Someone from your office called and needs to fax [facility] a prescription for me to get blood work for my thyroid. I tried to call back and got disconnected after a long wait. The [facility] address is [address] [place] [address] Trnk. [phone]. Thanks\"    Labs were faxed to [facility] for her and she is aware.

## 2020-09-08 NOTE — TELEPHONE ENCOUNTER
Pt was advised and will callback with name of [facility] that she would like her orders faxed to.

## 2020-09-14 RX ORDER — LISINOPRIL 10 MG/1
TABLET ORAL
Qty: 45 TAB | Refills: 3 | Status: SHIPPED | OUTPATIENT
Start: 2020-09-14 | End: 2021-06-02 | Stop reason: SDUPTHER

## 2020-09-15 LAB
T4 FREE SERPL-MCNC: 1.39 NG/DL (ref 0.82–1.77)
TSH SERPL DL<=0.005 MIU/L-ACNC: 0.98 UIU/ML (ref 0.45–4.5)

## 2020-10-15 DIAGNOSIS — M62.838 MUSCLE SPASM: ICD-10-CM

## 2020-10-16 RX ORDER — CARISOPRODOL 250 MG/1
TABLET ORAL
Qty: 90 TAB | Refills: 1 | Status: SHIPPED | OUTPATIENT
Start: 2020-10-16 | End: 2021-10-20 | Stop reason: SDUPTHER

## 2020-11-10 ENCOUNTER — TELEPHONE (OUTPATIENT)
Dept: FAMILY MEDICINE CLINIC | Age: 65
End: 2020-11-10

## 2020-11-10 DIAGNOSIS — E11.9 TYPE 2 DIABETES MELLITUS WITHOUT COMPLICATION, WITHOUT LONG-TERM CURRENT USE OF INSULIN (HCC): ICD-10-CM

## 2020-11-10 DIAGNOSIS — E78.00 HYPERCHOLESTEROLEMIA: Primary | ICD-10-CM

## 2020-11-10 DIAGNOSIS — K76.0 FATTY LIVER: ICD-10-CM

## 2020-11-10 DIAGNOSIS — E03.9 ACQUIRED HYPOTHYROIDISM: ICD-10-CM

## 2020-11-10 DIAGNOSIS — Z79.899 ENCOUNTER FOR LONG-TERM CURRENT USE OF MEDICATION: ICD-10-CM

## 2020-11-10 DIAGNOSIS — E55.9 VITAMIN D DEFICIENCY: ICD-10-CM

## 2020-11-10 NOTE — TELEPHONE ENCOUNTER
----- Message from [facility] sent at 11/10/2020  4:11 PM EST -----  Regarding: FW: Update Medical Information  Contact: [phone]    ----- Message -----  From: Sherly Santos  Sent: 11/10/2020   1:09 PM EST  To: Yessenia Lerma  Subject: Update Medical Information                       Do I just need to get lab work? If so can you call it in to [facility] on [address]. ? If I need a visit can you please still let me get the blood work at [facility] so I can make a later appointment. We are working 1/2 days and I don't want to have to take a day off since there are no appointments earlier than 10:00.  Thank you

## 2020-11-18 LAB
25(OH)D3+25(OH)D2 SERPL-MCNC: 35.4 NG/ML (ref 30–100)
ALBUMIN SERPL-MCNC: 4.5 G/DL (ref 3.8–4.8)
ALBUMIN/CREAT UR: 8 MG/G CREAT (ref 0–29)
ALBUMIN/GLOB SERPL: 1.7 {RATIO} (ref 1.2–2.2)
ALP SERPL-CCNC: 73 IU/L (ref 39–117)
ALT SERPL-CCNC: 51 IU/L (ref 0–32)
AST SERPL-CCNC: 37 IU/L (ref 0–40)
BASOPHILS # BLD AUTO: 0.1 X10E3/UL (ref 0–0.2)
BASOPHILS NFR BLD AUTO: 1 %
BILIRUB SERPL-MCNC: 0.4 MG/DL (ref 0–1.2)
BUN SERPL-MCNC: 10 MG/DL (ref 8–27)
BUN/CREAT SERPL: 11 (ref 12–28)
CALCIUM SERPL-MCNC: 10.2 MG/DL (ref 8.7–10.3)
CHLORIDE SERPL-SCNC: 99 MMOL/L (ref 96–106)
CHOLEST SERPL-MCNC: 212 MG/DL (ref 100–199)
CO2 SERPL-SCNC: 25 MMOL/L (ref 20–29)
CREAT SERPL-MCNC: 0.87 MG/DL (ref 0.57–1)
CREAT UR-MCNC: 140 MG/DL
EOSINOPHIL # BLD AUTO: 0.2 X10E3/UL (ref 0–0.4)
EOSINOPHIL NFR BLD AUTO: 2 %
ERYTHROCYTE [DISTWIDTH] IN BLOOD BY AUTOMATED COUNT: 12.4 % (ref 11.7–15.4)
EST. AVERAGE GLUCOSE BLD GHB EST-MCNC: 128 MG/DL
GLOBULIN SER CALC-MCNC: 2.7 G/DL (ref 1.5–4.5)
GLUCOSE SERPL-MCNC: 111 MG/DL (ref 65–99)
HBA1C MFR BLD: 6.1 % (ref 4.8–5.6)
HCT VFR BLD AUTO: 42.6 % (ref 34–46.6)
HDLC SERPL-MCNC: 54 MG/DL
HGB BLD-MCNC: 14.5 G/DL (ref 11.1–15.9)
IMM GRANULOCYTES # BLD AUTO: 0 X10E3/UL (ref 0–0.1)
IMM GRANULOCYTES NFR BLD AUTO: 0 %
INTERPRETATION, 910389: NORMAL
LDLC SERPL CALC-MCNC: 105 MG/DL (ref 0–99)
LYMPHOCYTES # BLD AUTO: 2.8 X10E3/UL (ref 0.7–3.1)
LYMPHOCYTES NFR BLD AUTO: 37 %
Lab: NORMAL
MCH RBC QN AUTO: 30.3 PG (ref 26.6–33)
MCHC RBC AUTO-ENTMCNC: 34 G/DL (ref 31.5–35.7)
MCV RBC AUTO: 89 FL (ref 79–97)
MICROALBUMIN UR-MCNC: 10.9 UG/ML
MONOCYTES # BLD AUTO: 0.6 X10E3/UL (ref 0.1–0.9)
MONOCYTES NFR BLD AUTO: 8 %
NEUTROPHILS # BLD AUTO: 4 X10E3/UL (ref 1.4–7)
NEUTROPHILS NFR BLD AUTO: 52 %
PLATELET # BLD AUTO: 312 X10E3/UL (ref 150–450)
POTASSIUM SERPL-SCNC: 4.5 MMOL/L (ref 3.5–5.2)
PROT SERPL-MCNC: 7.2 G/DL (ref 6–8.5)
RBC # BLD AUTO: 4.79 X10E6/UL (ref 3.77–5.28)
SODIUM SERPL-SCNC: 139 MMOL/L (ref 134–144)
TRIGL SERPL-MCNC: 310 MG/DL (ref 0–149)
VLDLC SERPL CALC-MCNC: 53 MG/DL (ref 5–40)
WBC # BLD AUTO: 7.6 X10E3/UL (ref 3.4–10.8)

## 2020-11-23 ENCOUNTER — OFFICE VISIT (OUTPATIENT)
Dept: FAMILY MEDICINE CLINIC | Age: 65
End: 2020-11-23
Payer: COMMERCIAL

## 2020-11-23 VITALS
OXYGEN SATURATION: 97 % | HEIGHT: 63 IN | HEART RATE: 84 BPM | SYSTOLIC BLOOD PRESSURE: 110 MMHG | DIASTOLIC BLOOD PRESSURE: 78 MMHG | WEIGHT: 187.8 LBS | TEMPERATURE: 97.9 F | BODY MASS INDEX: 33.27 KG/M2 | RESPIRATION RATE: 20 BRPM

## 2020-11-23 DIAGNOSIS — E11.9 TYPE 2 DIABETES MELLITUS WITHOUT COMPLICATION, WITHOUT LONG-TERM CURRENT USE OF INSULIN (HCC): ICD-10-CM

## 2020-11-23 DIAGNOSIS — E78.00 HYPERCHOLESTEROLEMIA: ICD-10-CM

## 2020-11-23 DIAGNOSIS — Z00.00 ROUTINE ADULT HEALTH MAINTENANCE: Primary | ICD-10-CM

## 2020-11-23 DIAGNOSIS — E03.9 ACQUIRED HYPOTHYROIDISM: ICD-10-CM

## 2020-11-23 DIAGNOSIS — F43.21 GRIEF REACTION: ICD-10-CM

## 2020-11-23 PROCEDURE — 99396 PREV VISIT EST AGE 40-64: CPT | Performed by: FAMILY MEDICINE

## 2020-11-23 RX ORDER — VENLAFAXINE HYDROCHLORIDE 75 MG/1
CAPSULE, EXTENDED RELEASE ORAL
Qty: 90 CAP | Refills: 3 | Status: SHIPPED | OUTPATIENT
Start: 2020-11-23 | End: 2021-04-17 | Stop reason: SDUPTHER

## 2020-11-23 RX ORDER — LEVOTHYROXINE SODIUM 200 UG/1
TABLET ORAL
Qty: 90 TAB | Refills: 3 | Status: SHIPPED | OUTPATIENT
Start: 2020-11-23 | End: 2021-05-28 | Stop reason: SDUPTHER

## 2020-11-23 NOTE — PROGRESS NOTES
Identified pt with two pt identifiers(name and ). Chief Complaint   Patient presents with    Diabetes    Cholesterol Problem    Constipation    Fatigue        Health Maintenance Due   Topic    PAP AKA CERVICAL CYTOLOGY     Foot Exam Q1        Wt Readings from Last 3 Encounters:   20 187 lb 12.8 oz (85.2 kg)   19 188 lb 3.2 oz (85.4 kg)   18 187 lb (84.8 kg)     Temp Readings from Last 3 Encounters:   20 97.9 °F (36.6 °C) (Oral)   19 97.8 °F (36.6 °C) (Oral)   18 97.6 °F (36.4 °C) (Oral)     BP Readings from Last 3 Encounters:   20 110/78   19 100/62   18 108/70     Pulse Readings from Last 3 Encounters:   20 84   19 62   18 75         Learning Assessment:  :     Learning Assessment 2018 1/15/2014   PRIMARY LEARNER Patient Patient   HIGHEST LEVEL OF EDUCATION - PRIMARY LEARNER  > 4 YEARS OF COLLEGE > 4 YEARS OF COLLEGE   BARRIERS PRIMARY LEARNER NONE NONE   CO-LEARNER CAREGIVER No No   PRIMARY LANGUAGE ENGLISH ENGLISH   LEARNER PREFERENCE PRIMARY DEMONSTRATION DEMONSTRATION   ANSWERED BY self self   RELATIONSHIP SELF SELF       Depression Screening:  :     3 most recent PHQ Screens 5/15/2020   Little interest or pleasure in doing things Not at all   Feeling down, depressed, irritable, or hopeless Not at all   Total Score PHQ 2 0       Fall Risk Assessment:  :     Fall Risk Assessment, last 12 mths 5/15/2020   Able to walk? Yes   Fall in past 12 months? No       Abuse Screening:  :     Abuse Screening Questionnaire 5/15/2020 2018 2017 2016 2015   Do you ever feel afraid of your partner? N N - N N   Are you in a relationship with someone who physically or mentally threatens you? N N N N N   Is it safe for you to go home?  Y Y N Y Y       Coordination of Care Questionnaire:  :     1) Have you been to an emergency room, urgent care clinic since your last visit? no   Hospitalized since your last visit? no 2) Have you seen or consulted any other health care providers outside of [address] [place] [address] since your last visit? no  (Include any pap smears or colon screenings in this section.)    3) Do you have an Advance Directive on file? no  Are you interested in receiving information about Advance Directives? no    Reviewed record in preparation for visit and have obtained necessary documentation. Medication reconciliation up to date and corrected with patient at this time.

## 2020-11-23 NOTE — PATIENT INSTRUCTIONS

## 2020-11-23 NOTE — PROGRESS NOTES
Subjective:   62 y.o. female for Well Woman Check. Her gyne and breast care is done elsewhere by her Ob-Gyne physician. Patient Active Problem List    Diagnosis Date Noted    Encounter for long-term current use of medication 11/10/2020    Diabetes ([address] 12/10/2010    Hypercholesterolemia 12/10/2010    Hypothyroidism 12/10/2010    HX: breast cancer 12/10/2010    Environmental allergies 12/10/2010    Arthritis 12/10/2010    Sleep apnea 12/10/2010    Fatty liver 12/10/2010     Current Outpatient Medications   Medication Sig Dispense Refill    lisinopriL (PRINIVIL, ZESTRIL) 10 mg tablet TAKE 1/2 TABLET DAILY 45 Tab 3    ezetimibe (ZETIA) 10 mg tablet TAKE 1 TABLET DAILY 90 Tab 1    liraglutide (Victoza 3-Cachorro) 0.6 mg/0.1 mL (18 mg/3 mL) pnij INJECT 1.8 MG UNDER THE    SKIN ONCE DAILY 27 mL 1    cetirizine (ZyrTEC) 10 mg tablet Take 10 mg by mouth daily.  metFORMIN ER (GLUCOPHAGE XR) 500 mg tablet TAKE 2 TABLETS IN THE      MORNING AND 2 TABLETS IN   THE EVENING 360 Tab 3    colesevelam (WELCHOL) 625 mg tablet TAKE 3 TABLETS TWICE A DAY WITH MEALS 540 Tab 3    Insulin Needles, Disposable, (LACY PEN NEEDLE) 32 gauge x 5/32\" ndle Use for Victoza pen one daily. 90 Pen Needle 4    Omeprazole delayed release (PRILOSEC D/R) 20 mg tablet Take 1 Tab by mouth daily. 90 Tab 1    fluticasone (FLONASE) 50 mcg/actuation nasal spray USE 2 SPRAYS TO BOTH NOSTRIL ONCE DAILY 16 g 5    milk thistle 500 mg cap Take 500 mg by mouth daily. 60 Cap 0    glucose blood VI test strips (ONE TOUCH ULTRA TEST) strip Use as directed 1 Package 11    aspirin delayed-release 81 mg tablet Take  by mouth daily.  cyanocobalamin (VITAMIN B-12) 2,500 mcg Subl Take 2,500 mg by mouth daily.  CALCIUM CARBONATE/VITAMIN D3 (CALCIUM 600 + D,3, PO) Take  by mouth.  magnesium 250 mg Tab Take  by mouth.  OMEGA-3 FATTY ACIDS (OMEGA 3 PO) Take 4 Tabs by mouth daily.       venlafaxine-SR (EFFEXOR-XR) 75 mg capsule TAKE 1 CAPSULE DAILY 90 Cap 3    Synthroid 200 mcg tablet TAKE 1 TABLET DAILY BEFORE BREAKFAST FOR A CONDITION  WITH LOW THYROID HORMONE   LEVELS 90 Tab 3    carisoprodoL (SOMA) 250 mg tablet TAKE 1 TABLET BY MOUTH 4 TIMES A DAY AS NEEDED FOR MUSCLE SPASM 90 Tab 1    albuterol (PROVENTIL HFA, VENTOLIN HFA, PROAIR HFA) 90 mcg/actuation inhaler Take 2 Puffs by inhalation every four (4) hours as needed for Wheezing. PRO AIR HFA 3 Inhaler 1    cholecalciferol, vitamin D3, (VITAMIN D3) 4,000 unit cap Take 4,000 Units by mouth daily. (Patient taking differently: Take 5,000 Units by mouth daily.) 90 Cap 5    diclofenac EC (VOLTAREN) 75 mg EC tablet TAKE 1 TABLET TWICE A DAY AS NEEDED 180 Tab 1    mometasone (NASONEX) 50 mcg/actuation nasal spray 2 Sprays daily.  MULTIVITAMIN PO Take  by mouth.        Allergies   Allergen Reactions    Crestor [Rosuvastatin] Other (comments)     Increased CK    Simvastatin Myalgia    Bee Pollen Itching    Mite Extract Itching     Past Medical History:   Diagnosis Date    Arthritis 12/10/2010    Breast cancer ([address]     removed 2/2/09    Diabetes ([address]     Fatty liver 12/10/2010    Hypercholesterolemia     Sleep apnea 12/10/2010    Thyroid disease      Past Surgical History:   Procedure Laterality Date    ENDOSCOPY, COLON, DIAGNOSTIC  1/2009    HX BREAST LUMPECTOMY  2/2/09    reincision    HX CATARACT REMOVAL Right 11/2016    HX CATARACT REMOVAL Left 11/2016    HX GYN  2010    cervical polypectomy    HX HEENT      HX LITHOTRIPSY  1994    HX ORTHOPAEDIC      foot    HX TONSIL AND ADENOIDECTOMY  1960     Family History   Problem Relation Age of Onset    Cancer Mother     COPD Mother     Cancer Father         mesothelioma    Arthritis-rheumatoid Sister      Social History     Tobacco Use    Smoking status: Never Smoker    Smokeless tobacco: Never Used   Substance Use Topics    Alcohol use: No     Alcohol/week: 0.0 standard drinks        Lab Results   Component Value Date/Time    WBC 7.6 11/17/2020 09:10 AM    HGB 14.5 11/17/2020 09:10 AM    HCT 42.6 11/17/2020 09:10 AM    PLATELET 860 24/67/8355 09:10 AM    MCV 89 11/17/2020 09:10 AM     Lab Results   Component Value Date/Time    Hemoglobin A1c 6.1 (H) 11/17/2020 09:10 AM    Hemoglobin A1c 5.9 (H) 05/27/2020 08:54 AM    Hemoglobin A1c 5.9 (H) 08/22/2019 09:21 AM    Glucose 111 (H) 11/17/2020 09:10 AM    Microalb/Creat ratio (ug/mg creat.) 8 11/17/2020 09:10 AM    LDL, calculated 74 05/27/2020 08:54 AM    LDL Chol Calc (NIH) 105 (H) 11/17/2020 09:10 AM    Creatinine 0.87 11/17/2020 09:10 AM      Lab Results   Component Value Date/Time    Cholesterol, total 212 (H) 11/17/2020 09:10 AM    HDL Cholesterol 54 11/17/2020 09:10 AM    LDL, calculated 74 05/27/2020 08:54 AM    LDL Chol Calc (NIH) 105 (H) 11/17/2020 09:10 AM    Triglyceride 310 (H) 11/17/2020 09:10 AM     Lab Results   Component Value Date/Time    ALT (SGPT) 51 (H) 11/17/2020 09:10 AM    Alk. phosphatase 73 11/17/2020 09:10 AM    Bilirubin, total 0.4 11/17/2020 09:10 AM    Albumin 4.5 11/17/2020 09:10 AM    Protein, total 7.2 11/17/2020 09:10 AM    PLATELET 915 84/40/7663 09:10 AM     Lab Results   Component Value Date/Time    GFR est non-AA 71 11/17/2020 09:10 AM    GFR est AA 82 11/17/2020 09:10 AM    Creatinine 0.87 11/17/2020 09:10 AM    BUN 10 11/17/2020 09:10 AM    Sodium 139 11/17/2020 09:10 AM    Potassium 4.5 11/17/2020 09:10 AM    Chloride 99 11/17/2020 09:10 AM    CO2 25 11/17/2020 09:10 AM    Magnesium 1.8 08/13/2015 10:54 AM     Lab Results   Component Value Date/Time    TSH 0.984 09/14/2020 02:00 PM    T4, Free 1.39 09/14/2020 02:00 PM         Specific concerns today: C/O fatigue. Continues to work FT at office. In last year her  and mother passed away. Son now live with her. Review of Systems  Pertinent items are noted in HPI.  + hard stools.     Objective:   Blood pressure 110/78, pulse 84, temperature 97.9 °F (36.6 °C), temperature source Oral, resp. rate 20, height 5' 3\" (1.6 m), weight 187 lb 12.8 oz (85.2 kg), SpO2 97 %. Physical Examination:   General appearance - alert, well appearing, and in no distress and overweight  Mental status - alert, oriented to person, place, and time  Ears - bilateral TM's and external ear canals normal  Nose - normal and patent, no erythema, discharge or polyps  Mouth - mucous membranes moist, pharynx normal without lesions  Chest - clear to auscultation, no wheezes, rales or rhonchi, symmetric air entry  Heart - normal rate, regular rhythm, normal S1, S2, no murmurs, rubs, clicks or gallops  Abdomen - soft, nontender, nondistended, no masses or organomegaly   Diabetic foot exam:     Left Foot:   Visual Exam: bunion   Pulse DP: 2+ (normal)   Filament test: normal sensation          Right Foot:   Visual Exam: normal    Pulse DP: 2+ (normal)   Filament test: normal sensation          Assessment/Plan:   Well woman  lose weight, increase physical activity, follow low fat diet, follow low salt diet, routine labs ordered    ICD-10-CM ICD-9-CM    1. Routine adult health maintenance  Z00.00 V70.0    2. Type 2 diabetes mellitus without complication, without long-term current use of insulin (HCC)  E11.9 250.00  DIABETES FOOT EXAM   3. Hypercholesterolemia  E78.00 272.0    4. Acquired hypothyroidism  E03.9 244.9    5. Grief reaction  F43.21 309.0      FU in 6 months  Try to get back to regular exercise  Pt declined refer for counseling.

## 2020-11-23 NOTE — LETTER
11/23/2020 Ms. Lisa Rubio [facility] [id number] [facility] [address] [address] Dear Lisa Adames: 
 
Please find your most recent results below. Resulted Orders CBC WITH AUTOMATED DIFF (Collected: 11/17/2020  9:10 AM) Result Value Ref Range WBC 7.6 3.4 - 10.8 x10E3/uL  
 RBC 4.79 3.77 - 5.28 x10E6/uL HGB 14.5 11.1 - 15.9 g/dL HCT 42.6 34.0 - 46.6 % MCV 89 79 - 97 fL  
 MCH 30.3 26.6 - 33.0 pg  
 MCHC 34.0 31.5 - 35.7 g/dL  
 RDW 12.4 11.7 - 15.4 % PLATELET 123 330 - 506 x10E3/uL NEUTROPHILS 52 Not Estab. % Lymphocytes 37 Not Estab. % MONOCYTES 8 Not Estab. % EOSINOPHILS 2 Not Estab. % BASOPHILS 1 Not Estab. %  
 ABS. NEUTROPHILS 4.0 1.4 - 7.0 x10E3/uL Abs Lymphocytes 2.8 0.7 - 3.1 x10E3/uL  
 ABS. MONOCYTES 0.6 0.1 - 0.9 x10E3/uL  
 ABS. EOSINOPHILS 0.2 0.0 - 0.4 x10E3/uL  
 ABS. BASOPHILS 0.1 0.0 - 0.2 x10E3/uL IMMATURE GRANULOCYTES 0 Not Estab. %  
 ABS. IMM. GRANS. 0.0 0.0 - 0.1 x10E3/uL Narrative Performed at:  [address]  [id number] : Rohit Barrientos MD, Phone:  [phone] METABOLIC PANEL, COMPREHENSIVE (Collected: 11/17/2020  9:10 AM) Result Value Ref Range Glucose 111 (H) 65 - 99 mg/dL BUN 10 8 - 27 mg/dL Creatinine 0.87 0.57 - 1.00 mg/dL GFR est non-AA 71 >59 mL/min/1.73 GFR est AA 82 >59 mL/min/1.73  
 BUN/Creatinine ratio 11 (L) 12 - 28 Sodium 139 134 - 144 mmol/L Potassium 4.5 3.5 - 5.2 mmol/L Chloride 99 96 - 106 mmol/L  
 CO2 25 20 - 29 mmol/L Calcium 10.2 8.7 - 10.3 mg/dL Protein, total 7.2 6.0 - 8.5 g/dL Albumin 4.5 3.8 - 4.8 g/dL GLOBULIN, TOTAL 2.7 1.5 - 4.5 g/dL A-G Ratio 1.7 1.2 - 2.2 Bilirubin, total 0.4 0.0 - 1.2 mg/dL Alk. phosphatase 73 39 - 117 IU/L  
 AST (SGOT) 37 0 - 40 IU/L  
 ALT (SGPT) 51 (H) 0 - 32 IU/L Narrative Performed at:  [address]  [id number] : Claudio Dos Santos MD, Phone:  [phone] LIPID PANEL (Collected: 11/17/2020  9:10 AM) Result Value Ref Range Cholesterol, total 212 (H) 100 - 199 mg/dL Triglyceride 310 (H) 0 - 149 mg/dL HDL Cholesterol 54 >39 mg/dL VLDL Cholesterol Bob 53 (H) 5 - 40 mg/dL LDL Chol Calc (NIH) 105 (H) 0 - 99 mg/dL Narrative Performed at:  [address]  [id number] : Claudio Dos Santos MD, Phone:  [phone] MICROALBUMIN, UR, RAND W/ MICROALB/CREAT RATIO (Collected: 11/17/2020  9:10 AM) Result Value Ref Range Creatinine, urine 140.0 Not Estab. mg/dL Microalbumin, urine 10.9 Not Estab. ug/mL Microalb/Creat ratio (ug/mg creat.) 8 0 - 29 mg/g creat Comment:  
                          Normal:                0 -  29 Moderately increased: 30 - 300 Severely increased:       >300 Narrative Performed at:  [address]  [id number] : Claudio Dos Santos MD, Phone:  [phone] HEMOGLOBIN A1C WITH EAG (Collected: 11/17/2020  9:10 AM) Result Value Ref Range Hemoglobin A1c 6.1 (H) 4.8 - 5.6 % Comment:  
            Prediabetes: 5.7 - 6.4 Diabetes: >6.4 Glycemic control for adults with diabetes: <7.0 Estimated average glucose 128 mg/dL Narrative Performed at:  [address]  [id number] : Claudio Dos Santos MD, Phone:  [phone] VITAMIN D, 25 HYDROXY (Collected: 11/17/2020  9:10 AM) Result Value Ref Range VITAMIN D, 25-HYDROXY 35.4 30.0 - 100.0 ng/mL Comment:  
   Vitamin D deficiency has been defined by the [facility] practice guideline as a 
level of serum 25-OH vitamin D less than 20 ng/mL (1,2).  
The Endocrine Society went on to further define vitamin D 
 insufficiency as a level between 21 and 29 ng/mL (2). 1. IOM ([place] of [employer]). 2010. Dietary reference 
   intakes for calcium and D. [facility]: The 
   [employer]. 2. Dwight MF, Flower CLARK, Amelia PIERCE, et al. 
   Evaluation, treatment, and prevention of vitamin D 
   deficiency: an Endocrine Society clinical practice 
   guideline. JCEM. 2011 Jul; 96(7):1911-30. Narrative Performed at:  [address]  [id number] : Delia Patel MD, Phone:  [phone] CVD REPORT (Collected: 11/17/2020  9:10 AM) Result Value Ref Range INTERPRETATION Note Comment:  
   Supplemental report is available. Narrative Performed at:  [address]  [id number] : Bernarda Lau MD, Phone:  [phone] DIABETES PATIENT EDUCATION (Collected: 11/17/2020  9:10 AM) Result Value Ref Range PDF Image Not applicable Narrative Performed at:  [address] [place] [address]  [id number] : Bernarda Lau MD, Phone:  [phone] RECOMMENDATIONS: 
 
Labs show good diabetes control. Cholesterol levels are up. Vit D is fine. Normal blood counts. Please call me if you have any questions: [phone] Sincerely, James Isabel MD

## 2020-12-01 DIAGNOSIS — M19.90 ARTHRITIS: ICD-10-CM

## 2020-12-01 RX ORDER — TRAMADOL HYDROCHLORIDE 50 MG/1
TABLET ORAL
Qty: 60 TAB | Refills: 1 | Status: SHIPPED | OUTPATIENT
Start: 2020-12-01 | End: 2020-12-31

## 2020-12-15 DIAGNOSIS — E11.9 TYPE 2 DIABETES MELLITUS WITHOUT COMPLICATION, WITHOUT LONG-TERM CURRENT USE OF INSULIN (HCC): ICD-10-CM

## 2020-12-15 RX ORDER — PEN NEEDLE, DIABETIC 32GX 5/32"
NEEDLE, DISPOSABLE MISCELLANEOUS
Qty: 100 PEN NEEDLE | Refills: 0 | Status: SHIPPED | OUTPATIENT
Start: 2020-12-15 | End: 2021-04-25 | Stop reason: SDUPTHER

## 2020-12-15 RX ORDER — LIRAGLUTIDE 6 MG/ML
INJECTION SUBCUTANEOUS
Qty: 27 ML | Refills: 0 | Status: SHIPPED | OUTPATIENT
Start: 2020-12-15 | End: 2021-04-25 | Stop reason: SDUPTHER

## 2020-12-30 RX ORDER — METFORMIN HYDROCHLORIDE 500 MG/1
TABLET, EXTENDED RELEASE ORAL
Qty: 360 TAB | Refills: 3 | Status: SHIPPED | OUTPATIENT
Start: 2020-12-30 | End: 2021-10-20 | Stop reason: SDUPTHER

## 2021-03-01 DIAGNOSIS — M19.90 ARTHRITIS: ICD-10-CM

## 2021-03-03 RX ORDER — TRAMADOL HYDROCHLORIDE 50 MG/1
50 TABLET ORAL
Qty: 90 TAB | Refills: 0 | Status: SHIPPED | OUTPATIENT
Start: 2021-03-03 | End: 2021-10-25 | Stop reason: SDUPTHER

## 2021-03-21 DIAGNOSIS — E78.00 HYPERCHOLESTEROLEMIA: ICD-10-CM

## 2021-03-22 RX ORDER — EZETIMIBE 10 MG/1
10 TABLET ORAL DAILY
Qty: 90 TAB | Refills: 1 | Status: SHIPPED | OUTPATIENT
Start: 2021-03-22 | End: 2021-08-14

## 2021-04-12 ENCOUNTER — VIRTUAL VISIT (OUTPATIENT)
Dept: FAMILY MEDICINE CLINIC | Age: 66
End: 2021-04-12
Payer: COMMERCIAL

## 2021-04-12 DIAGNOSIS — F99 INSOMNIA DUE TO OTHER MENTAL DISORDER: ICD-10-CM

## 2021-04-12 DIAGNOSIS — F43.21 GRIEF REACTION: Primary | ICD-10-CM

## 2021-04-12 DIAGNOSIS — F51.05 INSOMNIA DUE TO OTHER MENTAL DISORDER: ICD-10-CM

## 2021-04-12 PROCEDURE — 99213 OFFICE O/P EST LOW 20 MIN: CPT | Performed by: FAMILY MEDICINE

## 2021-04-12 RX ORDER — ZOLPIDEM TARTRATE 5 MG/1
5 TABLET ORAL
Qty: 30 TAB | Refills: 0 | Status: SHIPPED | OUTPATIENT
Start: 2021-04-12 | End: 2021-10-20 | Stop reason: ALTCHOICE

## 2021-04-12 NOTE — PROGRESS NOTES
Brady Vee is a 60 y.o. female who was seen by synchronous (real-time) audio-video technology on 4/12/2021 for Bereavement Counseling (she found son dead at home Easter Sunday)        Assessment & Plan:   Diagnoses and all orders for this visit:    1. Grief reaction    2. Insomnia due to other mental disorder  -     zolpidem (AMBIEN) 5 mg tablet; Take 1 Tab by mouth nightly as needed for Sleep. Max Daily Amount: 5 mg.    note to remain at home from 4/12- 5/2/21. Order Zolpidem PRN sleep  Call [facility] for grief counseling. I spent at least 8 minutes on this visit with this established patient. [id number]  Subjective:       Prior to Admission medications    Medication Sig Start Date End Date Taking? Authorizing Provider   zolpidem (AMBIEN) 5 mg tablet Take 1 Tab by mouth nightly as needed for Sleep. Max Daily Amount: 5 mg. 1/89/54  Yes Leodan Long MD   ezetimibe (ZETIA) 10 mg tablet Take 1 Tab by mouth daily. 5/00/85   Leodan Long MD   metFORMIN ER (GLUCOPHAGE XR) 500 mg tablet TAKE 2 TABLETS IN THE      MORNING AND 2 TABLETS IN   THE EVENING 62/73/80   Leodan Long MD   BD Melony 2nd Gen Pen Needle 32 gauge x 5/32\" ndle USE FOR VICTOZA PEN: USE   AND DISCARD 1 PEN NEEDLE   DAILY 12/15/20   Jackelyn Rush NP   liraglutide (Victoza 3-Cachorro) 0.6 mg/0.1 mL (18 mg/3 mL) pnij INJECT 1.8MG SUBCUTANEOUSLYDAILY 12/15/20   Jackelyn Rush NP   venlafaxine-SR (EFFEXOR-XR) 75 mg capsule TAKE 1 CAPSULE DAILY 72/96/05   Leodan Long MD   Synthroid 200 mcg tablet TAKE 1 TABLET DAILY BEFORE BREAKFAST FOR A CONDITION  WITH LOW THYROID HORMONE   LEVELS 35/08/68   Leodan Long MD   carisoprodoL (SOMA) 250 mg tablet TAKE 1 TABLET BY MOUTH 4 TIMES A DAY AS NEEDED FOR MUSCLE SPASM 45/71/79   Leodan Long MD   lisinopriL (PRINIVIL, ZESTRIL) 10 mg tablet TAKE 1/2 TABLET DAILY 0/78/04   Leodan Long MD   cetirizine (ZyrTEC) 10 mg tablet Take 10 mg by mouth daily.     Provider, Historical   colesevelam [facility]) 625 mg tablet TAKE 3 TABLETS TWICE A DAY WITH MEALS 1/41/63   Robb Hammans, MD   Omeprazole delayed release (PRILOSEC D/R) 20 mg tablet Take 1 Tab by mouth daily. 4/51/75   Robb Hammans, MD   albuterol (PROVENTIL HFA, VENTOLIN HFA, PROAIR HFA) 90 mcg/actuation inhaler Take 2 Puffs by inhalation every four (4) hours as needed for Wheezing. PRO AIR HFA 6/72/97   Robb Hammans, MD   cholecalciferol, vitamin D3, (VITAMIN D3) 4,000 unit cap Take 4,000 Units by mouth daily. Patient taking differently: Take 5,000 Units by mouth daily. 6/02/95   Robb Hammans, MD   diclofenac EC (VOLTAREN) 75 mg EC tablet TAKE 1 TABLET TWICE A DAY AS NEEDED 8/57/39   Robb Hammans, MD   mometasone (NASONEX) 50 mcg/actuation nasal spray 2 Sprays daily. Provider, Historical   fluticasone (FLONASE) 50 mcg/actuation nasal spray USE 2 SPRAYS TO BOTH NOSTRIL ONCE DAILY 1/14/65   Robb Hammans, MD   milk thistle 500 mg cap Take 500 mg by mouth daily. 2/14/13      glucose blood VI test strips (ONE TOUCH ULTRA TEST) strip Use as directed 3/02/60   Robb Hammans, MD   aspirin delayed-release 81 mg tablet Take  by mouth daily. Provider, Historical   cyanocobalamin (VITAMIN B-12) 2,500 mcg Subl Take 2,500 mg by mouth daily. 12/10/10   Provider, Historical   CALCIUM CARBONATE/VITAMIN D3 (CALCIUM 600 + D,3, PO) Take  by mouth. Provider, Historical   magnesium 250 mg Tab Take  by mouth. Provider, Historical   OMEGA-3 FATTY ACIDS (OMEGA 3 PO) Take 4 Tabs by mouth daily.  4/1/11   Provider, Historical     Patient Active Problem List    Diagnosis Date Noted    Encounter for long-term current use of medication 11/10/2020    Diabetes ([address] 12/10/2010    Hypercholesterolemia 12/10/2010    Hypothyroidism 12/10/2010    HX: breast cancer 12/10/2010    Environmental allergies 12/10/2010    Arthritis 12/10/2010    Sleep apnea 12/10/2010    Fatty liver 12/10/2010     Current Outpatient Medications   Medication Sig Dispense Refill    zolpidem (AMBIEN) 5 mg tablet Take 1 Tab by mouth nightly as needed for Sleep. Max Daily Amount: 5 mg. 30 Tab 0    ezetimibe (ZETIA) 10 mg tablet Take 1 Tab by mouth daily. 90 Tab 1    metFORMIN ER (GLUCOPHAGE XR) 500 mg tablet TAKE 2 TABLETS IN THE      MORNING AND 2 TABLETS IN   THE EVENING 360 Tab 3    BD Melony 2nd Gen Pen Needle 32 gauge x 5/32\" ndle USE FOR VICTOZA PEN: USE   AND DISCARD 1 PEN NEEDLE   DAILY 100 Pen Needle 0    liraglutide (Victoza 3-Cachorro) 0.6 mg/0.1 mL (18 mg/3 mL) pnij INJECT 1.8MG SUBCUTANEOUSLYDAILY 27 mL 0    venlafaxine-SR (EFFEXOR-XR) 75 mg capsule TAKE 1 CAPSULE DAILY 90 Cap 3    Synthroid 200 mcg tablet TAKE 1 TABLET DAILY BEFORE BREAKFAST FOR A CONDITION  WITH LOW THYROID HORMONE   LEVELS 90 Tab 3    carisoprodoL (SOMA) 250 mg tablet TAKE 1 TABLET BY MOUTH 4 TIMES A DAY AS NEEDED FOR MUSCLE SPASM 90 Tab 1    lisinopriL (PRINIVIL, ZESTRIL) 10 mg tablet TAKE 1/2 TABLET DAILY 45 Tab 3    cetirizine (ZyrTEC) 10 mg tablet Take 10 mg by mouth daily.  colesevelam (WELCHOL) 625 mg tablet TAKE 3 TABLETS TWICE A DAY WITH MEALS 540 Tab 3    Omeprazole delayed release (PRILOSEC D/R) 20 mg tablet Take 1 Tab by mouth daily. 90 Tab 1    albuterol (PROVENTIL HFA, VENTOLIN HFA, PROAIR HFA) 90 mcg/actuation inhaler Take 2 Puffs by inhalation every four (4) hours as needed for Wheezing. PRO AIR HFA 3 Inhaler 1    cholecalciferol, vitamin D3, (VITAMIN D3) 4,000 unit cap Take 4,000 Units by mouth daily. (Patient taking differently: Take 5,000 Units by mouth daily.) 90 Cap 5    diclofenac EC (VOLTAREN) 75 mg EC tablet TAKE 1 TABLET TWICE A DAY AS NEEDED 180 Tab 1    mometasone (NASONEX) 50 mcg/actuation nasal spray 2 Sprays daily.  fluticasone (FLONASE) 50 mcg/actuation nasal spray USE 2 SPRAYS TO BOTH NOSTRIL ONCE DAILY 16 g 5    milk thistle 500 mg cap Take 500 mg by mouth daily.  60 Cap 0    glucose blood VI test strips (ONE TOUCH ULTRA TEST) strip Use as directed 1 Package 11    aspirin delayed-release 81 mg tablet Take  by mouth daily.  cyanocobalamin (VITAMIN B-12) 2,500 mcg Subl Take 2,500 mg by mouth daily.  CALCIUM CARBONATE/VITAMIN D3 (CALCIUM 600 + D,3, PO) Take  by mouth.  magnesium 250 mg Tab Take  by mouth.  OMEGA-3 FATTY ACIDS (OMEGA 3 PO) Take 4 Tabs by mouth daily. Allergies   Allergen Reactions    Crestor [Rosuvastatin] Other (comments)     Increased CK    Simvastatin Myalgia    Bee Pollen Itching    Mite Extract Itching     Past Medical History:   Diagnosis Date    Arthritis 12/10/2010    [address] [address] [address]     removed 2/2/09    Diabetes ([facility]     Fatty liver 12/10/2010    Hypercholesterolemia     Sleep apnea 12/10/2010    Thyroid disease      Past Surgical History:   Procedure Laterality Date    ENDOSCOPY, COLON, DIAGNOSTIC  1/2009    HX BREAST LUMPECTOMY  2/2/09    reincision    HX CATARACT REMOVAL Right 11/2016    HX CATARACT REMOVAL Left 11/2016    HX GYN  2010    cervical polypectomy    HX HEENT      HX LITHOTRIPSY  1994    HX ORTHOPAEDIC      foot    HX TONSIL AND ADENOIDECTOMY  1960     Family History   Problem Relation Age of Onset    Cancer Mother     COPD Mother     Cancer Father         mesothelioma    Arthritis-rheumatoid Sister      Social History     Tobacco Use    Smoking status: Never Smoker    Smokeless tobacco: Never Used   Substance Use Topics    Alcohol use: No     Alcohol/week: 0.0 standard drinks       ROS  Pt found her son dead at home on Easter Sunday. Her  had passed about 2 years ago. She is requesting time off work. She has family support right now. Plan to call counseling thru [employer] at work. Poor sleep. Objective:   No flowsheet data found.    General: alert, cooperative, no distress   Mental  status: normal mood, behavior, speech, dress, motor activity, and thought processes, able to follow commands   HENT: NCAT   Neck: no visualized mass   Resp: no respiratory distress   Neuro: no gross deficits   Skin: no discoloration or lesions of concern on visible areas   Psychiatric: normal affect, consistent with stated mood, no evidence of hallucinations     Additional exam findings: We discussed the expected course, resolution and complications of the diagnosis(es) in detail. Medication risks, benefits, costs, interactions, and alternatives were discussed as indicated. I advised her to contact the office if her condition worsens, changes or fails to improve as anticipated. She expressed understanding with the diagnosis(es) and plan. [place]acy[place] Luce, was evaluated through a synchronous (real-time) audio-video encounter. The patient (or guardian if applicable) is aware that this is a billable service. Verbal consent to proceed has been obtained within the past 12 months. The visit was conducted pursuant to the emergency declaration under the [address] [place] [address] authority and the [employer] Act. Patient identification was verified, and a caregiver was present when appropriate. The patient was located in a state where the provider was credentialed to provide care.     Sally Aguilar MD

## 2021-04-19 RX ORDER — VENLAFAXINE HYDROCHLORIDE 75 MG/1
75 CAPSULE, EXTENDED RELEASE ORAL DAILY
Qty: 90 CAP | Refills: 3 | Status: SHIPPED | OUTPATIENT
Start: 2021-04-19 | End: 2022-04-01

## 2021-04-23 ENCOUNTER — TELEPHONE (OUTPATIENT)
Dept: FAMILY MEDICINE CLINIC | Age: 66
End: 2021-04-23

## 2021-04-23 NOTE — TELEPHONE ENCOUNTER
----- Message from Cyn Madera sent at 4/23/2021  1:28 PM EDT -----  Regarding: Medical Certification  Contact: [phone]  General Message/Vendor Calls    Caller's first and last name: Nina Hernandez from [facility] [employer].. Reason for call: Requesting to have confirmation of medical certification for missing work that was sent to insurance provider. Callback required yes/no and why: Yes, discussion of medical certification that was faxed to her office for patient. Best contact number(s):[phone]      Details to clarify the request: Nina Hernandez from [employer] on behalf of patient to discuss letter of medical certification for missing work that was sent to her office. Third party identifiers provided. Caridad Alvarez is needing to verify fax was sent from office to her, the medical reason for patient being out of work and to confirm the dates patient is to miss work. Caridad Alvarez stating that she received eight pages of information for patient regarding the leave of absence, but needs medical reason for missing as not assume anything. Please return call to confirm this information. Fax updated information to fax for [facility] [employer]. at [phone].        Cyn Madera

## 2021-04-25 DIAGNOSIS — E11.9 TYPE 2 DIABETES MELLITUS WITHOUT COMPLICATION, WITHOUT LONG-TERM CURRENT USE OF INSULIN (HCC): ICD-10-CM

## 2021-04-26 RX ORDER — PEN NEEDLE, DIABETIC 31 GX3/16"
NEEDLE, DISPOSABLE MISCELLANEOUS
Qty: 100 PEN NEEDLE | Refills: 3 | Status: SHIPPED | OUTPATIENT
Start: 2021-04-26 | End: 2022-04-22

## 2021-04-26 RX ORDER — LIRAGLUTIDE 6 MG/ML
INJECTION SUBCUTANEOUS
Qty: 27 ML | Refills: 0 | Status: SHIPPED | OUTPATIENT
Start: 2021-04-26 | End: 2021-07-27

## 2021-04-26 NOTE — TELEPHONE ENCOUNTER
Last appt: 5/15/2020  No future appointments.     Requested Prescriptions     Pending Prescriptions Disp Refills    Insulin Needles, Disposable, (BD Melony 2nd Gen Pen Needle) 32 gauge x 5/32\" ndle 100 Pen Needle 0    liraglutide (Victoza 3-Cachorro) 0.6 mg/0.1 mL (18 mg/3 mL) pnij 27 mL 0     Sig: INJECT 1.8MG SUBCUTANEOUSLYDAILY

## 2021-04-26 NOTE — TELEPHONE ENCOUNTER
I spoke with [facility] and will add further documentation to [facility] form for reason for medical leave.

## 2021-05-26 RX ORDER — COLESEVELAM 180 1/1
1875 TABLET ORAL 2 TIMES DAILY WITH MEALS
Qty: 540 TABLET | Refills: 3 | Status: SHIPPED | OUTPATIENT
Start: 2021-05-26 | End: 2022-05-08

## 2021-05-28 DIAGNOSIS — E03.9 ACQUIRED HYPOTHYROIDISM: ICD-10-CM

## 2021-06-01 RX ORDER — LEVOTHYROXINE SODIUM 200 UG/1
200 TABLET ORAL
Qty: 90 TABLET | Refills: 3 | Status: SHIPPED | OUTPATIENT
Start: 2021-06-01 | End: 2022-07-22

## 2021-06-02 RX ORDER — LISINOPRIL 10 MG/1
TABLET ORAL
Qty: 45 TABLET | Refills: 0 | Status: SHIPPED | OUTPATIENT
Start: 2021-06-02 | End: 2021-08-31

## 2021-07-22 ENCOUNTER — TELEPHONE (OUTPATIENT)
Dept: FAMILY MEDICINE CLINIC | Age: 66
End: 2021-07-22

## 2021-07-22 DIAGNOSIS — E78.00 HYPERCHOLESTEROLEMIA: ICD-10-CM

## 2021-07-22 DIAGNOSIS — E03.9 ACQUIRED HYPOTHYROIDISM: ICD-10-CM

## 2021-07-22 DIAGNOSIS — E55.9 VITAMIN D DEFICIENCY: ICD-10-CM

## 2021-07-22 DIAGNOSIS — S92.909S CLOSED FRACTURE OF FOOT, UNSPECIFIED LATERALITY, SEQUELA: ICD-10-CM

## 2021-07-22 DIAGNOSIS — E11.9 TYPE 2 DIABETES MELLITUS WITHOUT COMPLICATION, WITHOUT LONG-TERM CURRENT USE OF INSULIN (HCC): Primary | ICD-10-CM

## 2021-07-22 DIAGNOSIS — Z79.899 ENCOUNTER FOR LONG-TERM CURRENT USE OF MEDICATION: ICD-10-CM

## 2021-07-23 NOTE — TELEPHONE ENCOUNTER
----- Message from [place] sent at 7/22/2021 12:19 PM EDT -----  Regarding: FW: Test Results Question  Contact: [phone]    ----- Message -----  From: Li Goetz  Sent: 7/22/2021  11:59 AM EDT  To: [facility] Nurse Pool  Subject: Test Results Question                            Can you please send a request for my lab work to Francisca Seay at [facility]. Dr. Webber Better wants me to get my Vitamin D checked also. I broke my foot and am on steroids so my sugar may be raised. Thanks!

## 2021-07-23 NOTE — TELEPHONE ENCOUNTER
Dr. Ciara Pugh I rec'd a note from [facility]" requesting for you to please add Vitamin D and PTH as well. Can you add the PTH order and then I will fax.

## 2021-07-27 DIAGNOSIS — E11.9 TYPE 2 DIABETES MELLITUS WITHOUT COMPLICATION, WITHOUT LONG-TERM CURRENT USE OF INSULIN (HCC): ICD-10-CM

## 2021-07-27 RX ORDER — LIRAGLUTIDE 6 MG/ML
INJECTION SUBCUTANEOUS
Qty: 27 ML | Refills: 0 | Status: SHIPPED | OUTPATIENT
Start: 2021-07-27 | End: 2021-10-18

## 2021-07-27 NOTE — TELEPHONE ENCOUNTER
Pt was advised via telephone message, labs have been ordered for her and faxed to [facility] in [place] as requested.

## 2021-08-03 ENCOUNTER — TELEPHONE (OUTPATIENT)
Dept: FAMILY MEDICINE CLINIC | Age: 66
End: 2021-08-03

## 2021-08-03 LAB
25(OH)D3+25(OH)D2 SERPL-MCNC: 40.8 NG/ML (ref 30–100)
ALBUMIN SERPL-MCNC: 4.2 G/DL (ref 3.8–4.8)
ALBUMIN/GLOB SERPL: 1.7 {RATIO} (ref 1.2–2.2)
ALP SERPL-CCNC: 67 IU/L (ref 48–121)
ALT SERPL-CCNC: 59 IU/L (ref 0–32)
AST SERPL-CCNC: 43 IU/L (ref 0–40)
BASOPHILS # BLD AUTO: 0.1 X10E3/UL (ref 0–0.2)
BASOPHILS NFR BLD AUTO: 1 %
BILIRUB SERPL-MCNC: 0.3 MG/DL (ref 0–1.2)
BUN SERPL-MCNC: 11 MG/DL (ref 8–27)
BUN/CREAT SERPL: 14 (ref 12–28)
CALCIUM SERPL-MCNC: 10 MG/DL (ref 8.7–10.3)
CHLORIDE SERPL-SCNC: 100 MMOL/L (ref 96–106)
CHOLEST SERPL-MCNC: 224 MG/DL (ref 100–199)
CO2 SERPL-SCNC: 25 MMOL/L (ref 20–29)
CREAT SERPL-MCNC: 0.79 MG/DL (ref 0.57–1)
EOSINOPHIL # BLD AUTO: 0.2 X10E3/UL (ref 0–0.4)
EOSINOPHIL NFR BLD AUTO: 2 %
ERYTHROCYTE [DISTWIDTH] IN BLOOD BY AUTOMATED COUNT: 12.8 % (ref 11.7–15.4)
EST. AVERAGE GLUCOSE BLD GHB EST-MCNC: 137 MG/DL
GLOBULIN SER CALC-MCNC: 2.5 G/DL (ref 1.5–4.5)
GLUCOSE SERPL-MCNC: 101 MG/DL (ref 65–99)
HBA1C MFR BLD: 6.4 % (ref 4.8–5.6)
HCT VFR BLD AUTO: 41.4 % (ref 34–46.6)
HDLC SERPL-MCNC: 65 MG/DL
HGB BLD-MCNC: 13.6 G/DL (ref 11.1–15.9)
IMM GRANULOCYTES # BLD AUTO: 0 X10E3/UL (ref 0–0.1)
IMM GRANULOCYTES NFR BLD AUTO: 0 %
IMP & REVIEW OF LAB RESULTS: NORMAL
LDLC SERPL CALC-MCNC: 111 MG/DL (ref 0–99)
LYMPHOCYTES # BLD AUTO: 2.9 X10E3/UL (ref 0.7–3.1)
LYMPHOCYTES NFR BLD AUTO: 33 %
MCH RBC QN AUTO: 28.8 PG (ref 26.6–33)
MCHC RBC AUTO-ENTMCNC: 32.9 G/DL (ref 31.5–35.7)
MCV RBC AUTO: 88 FL (ref 79–97)
MONOCYTES # BLD AUTO: 0.6 X10E3/UL (ref 0.1–0.9)
MONOCYTES NFR BLD AUTO: 7 %
NEUTROPHILS # BLD AUTO: 4.9 X10E3/UL (ref 1.4–7)
NEUTROPHILS NFR BLD AUTO: 57 %
PLATELET # BLD AUTO: 268 X10E3/UL (ref 150–450)
POTASSIUM SERPL-SCNC: 4.8 MMOL/L (ref 3.5–5.2)
PROT SERPL-MCNC: 6.7 G/DL (ref 6–8.5)
PTH-INTACT SERPL-MCNC: 36 PG/ML (ref 15–65)
RBC # BLD AUTO: 4.73 X10E6/UL (ref 3.77–5.28)
SODIUM SERPL-SCNC: 138 MMOL/L (ref 134–144)
T4 FREE SERPL-MCNC: 1.08 NG/DL (ref 0.82–1.77)
TRIGL SERPL-MCNC: 280 MG/DL (ref 0–149)
TSH SERPL DL<=0.005 MIU/L-ACNC: 3.93 UIU/ML (ref 0.45–4.5)
VLDLC SERPL CALC-MCNC: 48 MG/DL (ref 5–40)
WBC # BLD AUTO: 8.7 X10E3/UL (ref 3.4–10.8)

## 2021-08-03 NOTE — LETTER
8/3/2021 11:30 AM    Ms. Judit Villeda  [address] [facility] [address]      Pt requested copy of her labs be faxed to Dr. Jourdan Longoria DPM.      Recent Results (from the past 336 hour(s))   CBC WITH AUTOMATED DIFF    Collection Time: 08/02/21  9:18 AM   Result Value Ref Range    WBC 8.7 3.4 - 10.8 x10E3/uL    RBC 4.73 3.77 - 5.28 x10E6/uL    HGB 13.6 11.1 - 15.9 g/dL    HCT 41.4 34.0 - 46.6 %    MCV 88 79 - 97 fL    MCH 28.8 26.6 - 33.0 pg    MCHC 32.9 31.5 - 35.7 g/dL    RDW 12.8 11.7 - 15.4 %    PLATELET 242 786 - 481 x10E3/uL    NEUTROPHILS 57 Not Estab. %    Lymphocytes 33 Not Estab. %    MONOCYTES 7 Not Estab. %    EOSINOPHILS 2 Not Estab. %    BASOPHILS 1 Not Estab. %    ABS. NEUTROPHILS 4.9 1.4 - 7.0 x10E3/uL    Abs Lymphocytes 2.9 0.7 - 3.1 x10E3/uL    ABS. MONOCYTES 0.6 0.1 - 0.9 x10E3/uL    ABS. EOSINOPHILS 0.2 0.0 - 0.4 x10E3/uL    ABS. BASOPHILS 0.1 0.0 - 0.2 x10E3/uL    IMMATURE GRANULOCYTES 0 Not Estab. %    ABS. IMM. GRANS. 0.0 0.0 - 0.1 K07J8/VB   METABOLIC PANEL, COMPREHENSIVE    Collection Time: 08/02/21  9:18 AM   Result Value Ref Range    Glucose 101 (H) 65 - 99 mg/dL    BUN 11 8 - 27 mg/dL    Creatinine 0.79 0.57 - 1.00 mg/dL    GFR est non-AA 79 >59 mL/min/1.73    GFR est AA 91 >59 mL/min/1.73    BUN/Creatinine ratio 14 12 - 28    Sodium 138 134 - 144 mmol/L    Potassium 4.8 3.5 - 5.2 mmol/L    Chloride 100 96 - 106 mmol/L    CO2 25 20 - 29 mmol/L    Calcium 10.0 8.7 - 10.3 mg/dL    Protein, total 6.7 6.0 - 8.5 g/dL    Albumin 4.2 3.8 - 4.8 g/dL    GLOBULIN, TOTAL 2.5 1.5 - 4.5 g/dL    A-G Ratio 1.7 1.2 - 2.2    Bilirubin, total 0.3 0.0 - 1.2 mg/dL    Alk.  phosphatase 67 48 - 121 IU/L    AST (SGOT) 43 (H) 0 - 40 IU/L    ALT (SGPT) 59 (H) 0 - 32 IU/L   LIPID PANEL    Collection Time: 08/02/21  9:18 AM   Result Value Ref Range    Cholesterol, total 224 (H) 100 - 199 mg/dL    Triglyceride 280 (H) 0 - 149 mg/dL    HDL Cholesterol 65 >39 mg/dL    VLDL, calculated 48 (H) 5 - 40 mg/dL    LDL, calculated 111 (H) 0 - 99 mg/dL   HEMOGLOBIN A1C WITH EAG    Collection Time: 08/02/21  9:18 AM   Result Value Ref Range    Hemoglobin A1c 6.4 (H) 4.8 - 5.6 %    Estimated average glucose 137 mg/dL   T4, FREE    Collection Time: 08/02/21  9:18 AM   Result Value Ref Range    T4, Free 1.08 0.82 - 1.77 ng/dL   TSH 3RD GENERATION    Collection Time: 08/02/21  9:18 AM   Result Value Ref Range    TSH 3.930 0.450 - 4.500 uIU/mL   VITAMIN D, 25 HYDROXY    Collection Time: 08/02/21  9:18 AM   Result Value Ref Range    VITAMIN D, 25-HYDROXY 40.8 30.0 - 100.0 ng/mL   CVD REPORT    Collection Time: 08/02/21  9:18 AM   Result Value Ref Range    INTERPRETATION Note    PTH INTACT    Collection Time: 08/02/21  9:19 AM   Result Value Ref Range    PTH, Intact 36 15 - 65 pg/mL       Sincerely,      Dr. Rocky Hoffman

## 2021-08-03 NOTE — TELEPHONE ENCOUNTER
Please speak to Jamel Chew about adding PTH to labs collected yesterday. Order is in labs 7/24/21. Also fax lab results to Podiatry Dr Kathlyne Dakins.

## 2021-08-03 NOTE — TELEPHONE ENCOUNTER
----- Message from [place] sent at 8/3/2021  8:26 AM EDT -----  Regarding: FW: Non-Urgent Medical Question  Contact: [phone]    ----- Message -----  From: Feliberto Magallon  Sent: 8/3/2021   8:26 AM EDT  To: [facility] Nurse Pool  Subject: Non-Urgent Medical Question                      Good morning, can you please send the test results to Dr. Martinez Goldstein at [facility]?

## 2021-08-03 NOTE — TELEPHONE ENCOUNTER
Complete results were drafted on letterhead in [facility] and faxed to Dr. Abhijit Hanley at [facility]: [phone].

## 2021-08-08 NOTE — TELEPHONE ENCOUNTER
Normal blood counts, kidney function, and thyroid level. Vit D level is fine. Cholesterol numbers higher. Liver tests are drifting up, too. Follow low fat diet. Take Welchol and Zetia daily. Good diabetes control. I wish to see you in person for visit soon.

## 2021-08-14 DIAGNOSIS — E78.00 HYPERCHOLESTEROLEMIA: ICD-10-CM

## 2021-08-14 RX ORDER — EZETIMIBE 10 MG/1
TABLET ORAL
Qty: 90 TABLET | Refills: 1 | Status: SHIPPED | OUTPATIENT
Start: 2021-08-14 | End: 2022-03-04

## 2021-08-31 RX ORDER — LISINOPRIL 10 MG/1
TABLET ORAL
Qty: 45 TABLET | Refills: 0 | Status: SHIPPED | OUTPATIENT
Start: 2021-08-31 | End: 2021-10-20 | Stop reason: DRUGHIGH

## 2021-10-18 DIAGNOSIS — E11.9 TYPE 2 DIABETES MELLITUS WITHOUT COMPLICATION, WITHOUT LONG-TERM CURRENT USE OF INSULIN (HCC): ICD-10-CM

## 2021-10-18 RX ORDER — LIRAGLUTIDE 6 MG/ML
INJECTION SUBCUTANEOUS
Qty: 27 ML | Refills: 0 | Status: SHIPPED | OUTPATIENT
Start: 2021-10-18 | End: 2021-10-20 | Stop reason: SDUPTHER

## 2021-10-20 ENCOUNTER — OFFICE VISIT (OUTPATIENT)
Dept: FAMILY MEDICINE CLINIC | Age: 66
End: 2021-10-20
Payer: COMMERCIAL

## 2021-10-20 VITALS
OXYGEN SATURATION: 96 % | HEIGHT: 63 IN | HEART RATE: 74 BPM | TEMPERATURE: 97.2 F | DIASTOLIC BLOOD PRESSURE: 78 MMHG | SYSTOLIC BLOOD PRESSURE: 122 MMHG | RESPIRATION RATE: 16 BRPM | BODY MASS INDEX: 34.02 KG/M2 | WEIGHT: 192 LBS

## 2021-10-20 DIAGNOSIS — E11.9 TYPE 2 DIABETES MELLITUS WITHOUT COMPLICATION, WITHOUT LONG-TERM CURRENT USE OF INSULIN (HCC): ICD-10-CM

## 2021-10-20 DIAGNOSIS — M62.838 MUSCLE SPASM: ICD-10-CM

## 2021-10-20 DIAGNOSIS — M21.612 BUNION, LEFT: ICD-10-CM

## 2021-10-20 DIAGNOSIS — Z79.899 ENCOUNTER FOR LONG-TERM CURRENT USE OF MEDICATION: ICD-10-CM

## 2021-10-20 DIAGNOSIS — Z01.818 PREOP GENERAL PHYSICAL EXAM: Primary | ICD-10-CM

## 2021-10-20 DIAGNOSIS — Z23 NEEDS FLU SHOT: ICD-10-CM

## 2021-10-20 DIAGNOSIS — I10 PRIMARY HYPERTENSION: ICD-10-CM

## 2021-10-20 LAB
ALBUMIN SERPL-MCNC: 3.9 G/DL (ref 3.5–5)
ALBUMIN/GLOB SERPL: 1.2 {RATIO} (ref 1.1–2.2)
ALP SERPL-CCNC: 69 U/L (ref 45–117)
ALT SERPL-CCNC: 83 U/L (ref 12–78)
ANION GAP SERPL CALC-SCNC: 5 MMOL/L (ref 5–15)
AST SERPL-CCNC: 57 U/L (ref 15–37)
BASOPHILS # BLD: 0.1 K/UL (ref 0–0.1)
BASOPHILS NFR BLD: 1 % (ref 0–1)
BILIRUB SERPL-MCNC: 0.3 MG/DL (ref 0.2–1)
BUN SERPL-MCNC: 8 MG/DL (ref 6–20)
BUN/CREAT SERPL: 12 (ref 12–20)
CALCIUM SERPL-MCNC: 10.1 MG/DL (ref 8.5–10.1)
CHLORIDE SERPL-SCNC: 106 MMOL/L (ref 97–108)
CO2 SERPL-SCNC: 27 MMOL/L (ref 21–32)
CREAT SERPL-MCNC: 0.69 MG/DL (ref 0.55–1.02)
CREAT UR-MCNC: 98.9 MG/DL
DIFFERENTIAL METHOD BLD: NORMAL
EOSINOPHIL # BLD: 0.2 K/UL (ref 0–0.4)
EOSINOPHIL NFR BLD: 3 % (ref 0–7)
ERYTHROCYTE [DISTWIDTH] IN BLOOD BY AUTOMATED COUNT: 12.3 % (ref 11.5–14.5)
GLOBULIN SER CALC-MCNC: 3.3 G/DL (ref 2–4)
GLUCOSE SERPL-MCNC: 103 MG/DL (ref 65–100)
HCT VFR BLD AUTO: 41.3 % (ref 35–47)
HGB BLD-MCNC: 13.1 G/DL (ref 11.5–16)
IMM GRANULOCYTES # BLD AUTO: 0 K/UL (ref 0–0.04)
IMM GRANULOCYTES NFR BLD AUTO: 0 % (ref 0–0.5)
LYMPHOCYTES # BLD: 2 K/UL (ref 0.8–3.5)
LYMPHOCYTES NFR BLD: 33 % (ref 12–49)
MCH RBC QN AUTO: 29.5 PG (ref 26–34)
MCHC RBC AUTO-ENTMCNC: 31.7 G/DL (ref 30–36.5)
MCV RBC AUTO: 93 FL (ref 80–99)
MICROALBUMIN UR-MCNC: 0.75 MG/DL
MICROALBUMIN/CREAT UR-RTO: 8 MG/G (ref 0–30)
MONOCYTES # BLD: 0.5 K/UL (ref 0–1)
MONOCYTES NFR BLD: 8 % (ref 5–13)
NEUTS SEG # BLD: 3.3 K/UL (ref 1.8–8)
NEUTS SEG NFR BLD: 55 % (ref 32–75)
NRBC # BLD: 0 K/UL (ref 0–0.01)
NRBC BLD-RTO: 0 PER 100 WBC
PLATELET # BLD AUTO: 257 K/UL (ref 150–400)
PMV BLD AUTO: 9.9 FL (ref 8.9–12.9)
POTASSIUM SERPL-SCNC: 5 MMOL/L (ref 3.5–5.1)
PROT SERPL-MCNC: 7.2 G/DL (ref 6.4–8.2)
RBC # BLD AUTO: 4.44 M/UL (ref 3.8–5.2)
SODIUM SERPL-SCNC: 138 MMOL/L (ref 136–145)
WBC # BLD AUTO: 6.1 K/UL (ref 3.6–11)

## 2021-10-20 PROCEDURE — 90471 IMMUNIZATION ADMIN: CPT | Performed by: FAMILY MEDICINE

## 2021-10-20 PROCEDURE — 90686 IIV4 VACC NO PRSV 0.5 ML IM: CPT | Performed by: FAMILY MEDICINE

## 2021-10-20 PROCEDURE — 93000 ELECTROCARDIOGRAM COMPLETE: CPT | Performed by: FAMILY MEDICINE

## 2021-10-20 PROCEDURE — 99214 OFFICE O/P EST MOD 30 MIN: CPT | Performed by: FAMILY MEDICINE

## 2021-10-20 RX ORDER — METFORMIN HYDROCHLORIDE 500 MG/1
TABLET, EXTENDED RELEASE ORAL
Qty: 360 TABLET | Refills: 3 | Status: SHIPPED | OUTPATIENT
Start: 2021-10-20 | End: 2022-01-10

## 2021-10-20 RX ORDER — LISINOPRIL 5 MG/1
5 TABLET ORAL DAILY
Qty: 90 TABLET | Refills: 3 | Status: SHIPPED | OUTPATIENT
Start: 2021-10-20 | End: 2022-09-21 | Stop reason: SDUPTHER

## 2021-10-20 RX ORDER — CARISOPRODOL 250 MG/1
TABLET ORAL
Qty: 90 TABLET | Refills: 1 | Status: SHIPPED | OUTPATIENT
Start: 2021-10-20

## 2021-10-20 RX ORDER — LIRAGLUTIDE 6 MG/ML
INJECTION SUBCUTANEOUS
Qty: 27 ML | Refills: 0 | Status: SHIPPED | OUTPATIENT
Start: 2021-10-20 | End: 2022-01-21

## 2021-10-20 NOTE — PROGRESS NOTES
Preoperative Evaluation    Date of Exam: 10/20/2021    Henry Starkey is a 60 y.o. female (:1955) who presents for preoperative evaluation. Latex Allergy: no    Problem List:     Patient Active Problem List    Diagnosis Date Noted    Vitamin D deficiency 2021    Encounter for long-term current use of medication 11/10/2020    Diabetes ([address] 12/10/2010    Hypercholesterolemia 12/10/2010    Hypothyroidism 12/10/2010    HX: breast cancer 12/10/2010    Environmental allergies 12/10/2010    Arthritis 12/10/2010    Sleep apnea 12/10/2010    Fatty liver 12/10/2010     Medical History:     Past Medical History:   Diagnosis Date    Arthritis 12/10/2010    Breast cancer ([facility] [address]     removed 09    Diabetes ([facility] [address]     Fatty liver 12/10/2010    Hypercholesterolemia     Sleep apnea 12/10/2010    Thyroid disease      Allergies: Allergies   Allergen Reactions    Crestor [Rosuvastatin] Other (comments)     Increased CK    Simvastatin Myalgia    Bee Pollen Itching    Mite Extract Itching      Medications:     Current Outpatient Medications   Medication Sig    carisoprodoL (SOMA) 250 mg tablet TAKE 1 TABLET BY MOUTH 4 TIMES A DAY AS NEEDED FOR MUSCLE SPASM    liraglutide (Victoza 3-Cachorro) 0.6 mg/0.1 mL (18 mg/3 mL) pnij 1.8 mg daily.  lisinopriL (PRINIVIL, ZESTRIL) 5 mg tablet Take 1 Tablet by mouth daily.  metFORMIN ER (GLUCOPHAGE XR) 500 mg tablet TAKE 2 TABLETS IN THE      MORNING AND 2 TABLETS IN   THE EVENING    ezetimibe (ZETIA) 10 mg tablet TAKE 1 TABLET DAILY    levothyroxine (Synthroid) 200 mcg tablet Take 1 Tablet by mouth Daily (before breakfast).  colesevelam (WELCHOL) 625 mg tablet Take 3 Tablets by mouth two (2) times daily (with meals).  Insulin Needles, Disposable, (BD Melony 2nd Gen Pen Needle) 32 gauge x 5/32\" ndle Use 1 daily for Victoza pen.  venlafaxine-SR (EFFEXOR-XR) 75 mg capsule Take 1 Cap by mouth daily.     cetirizine (ZyrTEC) 10 mg tablet Take 10 mg by mouth daily.  Omeprazole delayed release (PRILOSEC D/R) 20 mg tablet Take 1 Tab by mouth daily.  albuterol (PROVENTIL HFA, VENTOLIN HFA, PROAIR HFA) 90 mcg/actuation inhaler Take 2 Puffs by inhalation every four (4) hours as needed for Wheezing. PRO AIR HFA    cholecalciferol, vitamin D3, (VITAMIN D3) 4,000 unit cap Take 4,000 Units by mouth daily. (Patient taking differently: Take 5,000 Units by mouth daily.)    diclofenac EC (VOLTAREN) 75 mg EC tablet TAKE 1 TABLET TWICE A DAY AS NEEDED    mometasone (NASONEX) 50 mcg/actuation nasal spray 2 Sprays daily.  fluticasone (FLONASE) 50 mcg/actuation nasal spray USE 2 SPRAYS TO BOTH NOSTRIL ONCE DAILY    milk thistle 500 mg cap Take 500 mg by mouth daily.  glucose blood VI test strips (ONE TOUCH ULTRA TEST) strip Use as directed    aspirin delayed-release 81 mg tablet Take  by mouth daily.  cyanocobalamin (VITAMIN B-12) 2,500 mcg Subl Take 2,500 mg by mouth daily.  CALCIUM CARBONATE/VITAMIN D3 (CALCIUM 600 + D,3, PO) Take  by mouth.  magnesium 250 mg Tab Take  by mouth.  OMEGA-3 FATTY ACIDS (OMEGA 3 PO) Take 4 Tabs by mouth daily. No current facility-administered medications for this visit.      Surgical History:     Past Surgical History:   Procedure Laterality Date    ENDOSCOPY, COLON, DIAGNOSTIC  1/2009    HX BREAST LUMPECTOMY  2/2/09    reincision    HX CATARACT REMOVAL Right 11/2016    HX CATARACT REMOVAL Left 11/2016    HX GYN  2010    cervical polypectomy    HX HEENT      HX LITHOTRIPSY  1994    HX ORTHOPAEDIC      foot    HX TONSIL AND ADENOIDECTOMY  1960     Social History:     Social History     Socioeconomic History    Marital status:      Spouse name: Not on file    Number of children: Not on file    Years of education: Not on file    Highest education level: Not on file   Tobacco Use    Smoking status: Never Smoker    Smokeless tobacco: Never Used   Substance and Sexual Activity    Alcohol use: No     Alcohol/week: 0.0 standard drinks    Drug use: No    Sexual activity: Yes     Partners: Male     Social Determinants of Health     Financial Resource Strain:     Difficulty of Paying Living Expenses:    Food Insecurity:     Worried About Running Out of Food in the Last Year:     [address] in the Last Year:    Transportation Needs:     Lack of Transportation (Medical):  Lack of Transportation (Non-Medical):    Physical Activity:     Days of Exercise per Week:     Minutes of Exercise per Session:    Stress:     Feeling of Stress :    Social Connections:     Frequency of Communication with Friends and Family:     Frequency of Social Gatherings with Friends and Family:     Attends Sikhism Services:     Active Member of Clubs or Organizations:     Attends Club or Organization Meetings:     Marital Status:        Anesthesia Complications: None  History of abnormal bleeding : None  History of Blood Transfusions: no  Health Care Directive or Living Will: no    Objective:     ROS:   Feeling well. No dyspnea or chest pain on exertion. No abdominal pain, change in bowel habits, black or bloody stools. No urinary tract symptoms. GYN ROS: . No neurological complaints. OBJECTIVE:   The patient appears well, alert, oriented x 3, in no distress. Visit Vitals  /78 (BP 1 Location: Right arm, BP Patient Position: Sitting, BP Cuff Size: Adult)   Pulse 74   Temp 97.2 °F (36.2 °C) (Temporal)   Resp 16   Ht 5' 3\" (1.6 m)   Wt 192 lb (87.1 kg)   SpO2 96%   BMI 34.01 kg/m²     HEENT:ENT normal.  Neck supple. No adenopathy or thyromegaly. ROBIN. Chest: Lungs are clear, good air entry, no wheezes, rhonchi or rales. Cardiovascular: S1 and S2 normal, no murmurs, regular rate and rhythm. Abdomen: soft without tenderness, guarding, mass or organomegaly. Extremities: show no edema, normal peripheral pulses. Neurological: is normal, no focal findings. DIAGNOSTICS:   1.  EKG: EKG FINDINGS - normal EKG, normal sinus rhythm    2. Labs:   Lab Results   Component Value Date/Time    WBC 6.1 10/20/2021 01:59 PM    HGB 13.1 10/20/2021 01:59 PM    HCT 41.3 10/20/2021 01:59 PM    PLATELET 071 61/01/9527 01:59 PM    MCV 93.0 10/20/2021 01:59 PM     Lab Results   Component Value Date/Time    ALT (SGPT) 83 (H) 10/20/2021 01:59 PM    Alk.  phosphatase 69 10/20/2021 01:59 PM    Bilirubin, total 0.3 10/20/2021 01:59 PM    Albumin 3.9 10/20/2021 01:59 PM    Protein, total 7.2 10/20/2021 01:59 PM    PLATELET 642 71/60/0920 01:59 PM     Lab Results   Component Value Date/Time    GFR est non-AA >60 10/20/2021 01:59 PM    GFR est AA >60 10/20/2021 01:59 PM    Creatinine 0.69 10/20/2021 01:59 PM    BUN 8 10/20/2021 01:59 PM    Sodium 138 10/20/2021 01:59 PM    Potassium 5.0 10/20/2021 01:59 PM    Chloride 106 10/20/2021 01:59 PM    CO2 27 10/20/2021 01:59 PM    Magnesium 1.8 08/13/2015 10:54 AM    PTH, Intact 36 08/02/2021 09:19 AM     Lab Results   Component Value Date/Time    Glucose 103 (H) 10/20/2021 01:59 PM        IMPRESSION:   Low risk for planned surgery  No contraindications to planned surgery    Pankaj Bustamante MD   84/15/7222

## 2021-10-20 NOTE — PROGRESS NOTES
Identified pt with two pt identifiers(name and ). Chief Complaint   Patient presents with    Pre-op Exam        Health Maintenance Due   Topic    Flu Vaccine (1)    MICROALBUMIN Q1        Wt Readings from Last 3 Encounters:   10/20/21 192 lb (87.1 kg)   20 187 lb 12.8 oz (85.2 kg)   19 188 lb 3.2 oz (85.4 kg)     Temp Readings from Last 3 Encounters:   10/20/21 97.2 °F (36.2 °C) (Temporal)   20 97.9 °F (36.6 °C) (Oral)   19 97.8 °F (36.6 °C) (Oral)     BP Readings from Last 3 Encounters:   10/20/21 122/78   20 110/78   19 100/62     Pulse Readings from Last 3 Encounters:   10/20/21 74   20 84   19 62         Learning Assessment:  :     Learning Assessment 2018 1/15/2014   PRIMARY LEARNER Patient Patient   HIGHEST LEVEL OF EDUCATION - PRIMARY LEARNER  > 4 YEARS OF COLLEGE > 4 YEARS OF COLLEGE   BARRIERS PRIMARY LEARNER NONE NONE   CO-LEARNER CAREGIVER No No   PRIMARY LANGUAGE ENGLISH ENGLISH   LEARNER PREFERENCE PRIMARY DEMONSTRATION DEMONSTRATION   ANSWERED BY self self   RELATIONSHIP SELF SELF       Depression Screening:  :     3 most recent PHQ Screens 10/20/2021   Little interest or pleasure in doing things Not at all   Feeling down, depressed, irritable, or hopeless Not at all   Total Score PHQ 2 0       Fall Risk Assessment:  :     Fall Risk Assessment, last 12 mths 5/15/2020   Able to walk? Yes   Fall in past 12 months? No       Abuse Screening:  :     Abuse Screening Questionnaire 10/20/2021 5/15/2020 2018 2017 2016 2015   Do you ever feel afraid of your partner? N N N - N N   Are you in a relationship with someone who physically or mentally threatens you? N N N N N N   Is it safe for you to go home?  Y Y Y N Y Y       Coordination of Care Questionnaire:  :     1) Have you been to an emergency room, urgent care clinic since your last visit? no   Hospitalized since your last visit? no             2) Have you seen or consulted any other health care providers outside of [address] [place] [address] since your last visit? no  (Include any pap smears or colon screenings in this section.)    3) Do you have an Advance Directive on file? yes  Are you interested in receiving information about Advance Directives? no      Reviewed record in preparation for visit and have obtained necessary documentation.

## 2021-10-20 NOTE — PATIENT INSTRUCTIONS
Vaccine Information Statement    Influenza (Flu) Vaccine (Inactivated or Recombinant): What You Need to Know    Many vaccine information statements are available in [place] and other languages. See www.immunize.org/vis. Hojas de información sobre vacunas están disponibles en español y en muchos otros idiomas. Visite www.immunize.org/vis. 1. Why get vaccinated? Influenza vaccine can prevent influenza (flu). Flu is a contagious disease that spreads around the [facility] every year, usually between October and May. Anyone can get the flu, but it is more dangerous for some people. Infants and young children, people 72 years and older, pregnant people, and people with certain health conditions or a weakened immune system are at greatest risk of flu complications. Pneumonia, bronchitis, sinus infections, and ear infections are examples of flu-related complications. If you have a medical condition, such as heart disease, cancer, or diabetes, flu can make it worse. Flu can cause fever and chills, sore throat, muscle aches, fatigue, cough, headache, and runny or stuffy nose. Some people may have vomiting and diarrhea, though this is more common in children than adults. In an average year, thousands of people in the [facility] die from flu, and many more are hospitalized. Flu vaccine prevents millions of illnesses and flu-related visits to the doctor each year. 2. Influenza vaccines     CDC recommends everyone 6 months and older get vaccinated every flu season. Children 6 months through 6years of age may need 2 doses during a single flu season. Everyone else needs only 1 dose each flu season. It takes about 2 weeks for protection to develop after vaccination. There are many flu viruses, and they are always changing. Each year a new flu vaccine is made to protect against the influenza viruses believed to be likely to cause disease in the upcoming flu season.  Even when the vaccine doesnt exactly match these viruses, it may still provide some protection. Influenza vaccine does not cause flu. Influenza vaccine may be given at the same time as other vaccines. 3. Talk with your health care provider    Tell your vaccination provider if the person getting the vaccine:   Has had an allergic reaction after a previous dose of influenza vaccine, or has any severe, life-threatening allergies    Has ever had Guillain-Barré Syndrome (also called GBS)    In some cases, your health care provider may decide to postpone influenza vaccination until a future visit. Influenza vaccine can be administered at any time during pregnancy. People who are or will be pregnant during influenza season should receive inactivated influenza vaccine. People with minor illnesses, such as a cold, may be vaccinated. People who are moderately or severely ill should usually wait until they recover before getting influenza vaccine. Your health care provider can give you more information. 4. Risks of a vaccine reaction     Soreness, redness, and swelling where the shot is given, fever, muscle aches, and headache can happen after influenza vaccination.  There may be a very small increased risk of Guillain-Barré Syndrome (GBS) after inactivated influenza vaccine (the flu shot). Charlette Cervantes children who get the flu shot along with pneumococcal vaccine (PCV13) and/or DTaP vaccine at the same time might be slightly more likely to have a seizure caused by fever. Tell your health care provider if a child who is getting flu vaccine has ever had a seizure. People sometimes faint after medical procedures, including vaccination. Tell your provider if you feel dizzy or have vision changes or ringing in the ears. As with any medicine, there is a very remote chance of a vaccine causing a severe allergic reaction, other serious injury, or death. 5. What if there is a serious problem?     An allergic reaction could occur after the vaccinated person leaves the clinic. If you see signs of a severe allergic reaction (hives, swelling of the face and throat, difficulty breathing, a fast heartbeat, dizziness, or weakness), call 9-1-1 and get the person to the nearest hospital.    For other signs that concern you, call your health care provider. Adverse reactions should be reported to the Vaccine Adverse Event Reporting System (VAERS). Your health care provider will usually file this report, or you can do it yourself. Visit the VAERS website at www.vaers. [facility].gov or call 8-288.348.1374. VAERS is only for reporting reactions, and VAERS staff members do not give medical advice. 6. The National Vaccine Injury Compensation Program    The [facility] Vaccine Injury Compensation Program (VICP) is a federal program that was created to compensate people who may have been injured by certain vaccines. Claims regarding alleged injury or death due to vaccination have a time limit for filing, which may be as short as two years. Visit the VICP website at www.[facility].gov/vaccinecompensation or call 0-558.428.2693 to learn about the program and about filing a claim. 7. How can I learn more?  Ask your health care provider.  Call your local or state health department.  Visit the website of the Food and Drug Administration (FDA) for vaccine package inserts and additional information at www.fda.gov/vaccines-blood-biologics/vaccines.  Contact the Centers for Disease Control and Prevention (CDC):  - Call 8-877.146.9293 (1-800-CDC-INFO) or  - Visit CDCs influenza website at www.cdc.gov/flu. Vaccine Information Statement   Inactivated Influenza Vaccine   8/6/2021  42 THERESA Renee [id number]   Department of Health and Human Services  Centers for Disease Control and Prevention    Office Use Only

## 2021-10-21 ENCOUNTER — TELEPHONE (OUTPATIENT)
Dept: FAMILY MEDICINE CLINIC | Age: 66
End: 2021-10-21

## 2021-10-22 NOTE — PROGRESS NOTES
Normal urine diabetes test. Liver enzymes continue to be slightly elevated. Normal blood cell counts.

## 2021-10-25 ENCOUNTER — TELEPHONE (OUTPATIENT)
Dept: FAMILY MEDICINE CLINIC | Age: 66
End: 2021-10-25

## 2021-10-25 DIAGNOSIS — M19.90 ARTHRITIS: ICD-10-CM

## 2021-10-25 RX ORDER — TRAMADOL HYDROCHLORIDE 50 MG/1
50 TABLET ORAL
Qty: 90 TABLET | Refills: 0 | Status: SHIPPED | OUTPATIENT
Start: 2021-10-25 | End: 2021-11-24

## 2021-10-25 NOTE — TELEPHONE ENCOUNTER
----- Message from [facility] sent at 10/25/2021 11:57 AM EDT -----  Regarding: FW: Prescription Question  Contact: [phone]    ----- Message -----  From: Rand Salazar  Sent: 10/25/2021  11:57 AM EDT  To: [facility] Nurse Pool  Subject: Prescription Question                            Can you please call me in a refill for Tramadol to [address] [place] [address] [employer] [phone] I will not be using insurance I need to use [facility].

## 2021-10-26 ENCOUNTER — TELEPHONE (OUTPATIENT)
Dept: FAMILY MEDICINE CLINIC | Age: 66
End: 2021-10-26

## 2021-10-26 NOTE — TELEPHONE ENCOUNTER
[facility] is calling in regards to traMADoL (ULTRAM) 50 mg tablet as pt has not had this filled in about 6 months so considered opioid naive and needs to have doctor call and confirm to dispense 30 days as pt is only allowed 7 days without dr calling    Call [facility] at [phone]

## 2021-11-08 ENCOUNTER — TRANSCRIBE ORDER (OUTPATIENT)
Dept: REGISTRATION | Age: 66
End: 2021-11-08

## 2021-11-08 DIAGNOSIS — Z01.812 PRE-PROCEDURE LAB EXAM: Primary | ICD-10-CM

## 2021-11-08 NOTE — PERIOP NOTES
PATIENT CALLED AND MADE AWARE OF COVID-19 TESTING NEEDED TO BE DONE PRIOR TO SURGERY. COVID-19 TESTING APPOINTMENT MADE FOR PATIENT. PATIENT INSTRUCTED ON SELF QUARANTINE BETWEEN TESTING AND ARRIVAL TIME DAY OF SURGERY.

## 2021-11-09 ENCOUNTER — HOSPITAL ENCOUNTER (OUTPATIENT)
Dept: PREADMISSION TESTING | Age: 66
Discharge: HOME | End: 2021-11-09
Payer: COMMERCIAL

## 2021-11-09 DIAGNOSIS — Z01.812 PRE-PROCEDURE LAB EXAM: ICD-10-CM

## 2021-11-09 PROCEDURE — U0005 INFEC AGEN DETEC AMPLI PROBE: HCPCS

## 2021-11-10 LAB
SARS-COV-2, XPLCVT: NOT DETECTED
SOURCE, COVRS: NORMAL

## 2021-11-11 PROBLEM — M21.612 BUNION OF LEFT FOOT: Status: ACTIVE | Noted: 2021-11-11

## 2021-11-11 PROBLEM — M20.12 HALLUX ABDUCTO VALGUS, LEFT: Status: ACTIVE | Noted: 2021-11-11

## 2021-11-11 NOTE — H&P
Patient is acceptable risk for left foot bunionectomy with first metatarsal osteotomy with fixation. History and physical is updated and there are no changes.   All information is faxed to the main OR

## 2021-11-12 ENCOUNTER — ANESTHESIA EVENT (OUTPATIENT)
Dept: SURGERY | Age: 66
End: 2021-11-12
Payer: COMMERCIAL

## 2021-11-12 ENCOUNTER — HOSPITAL ENCOUNTER (OUTPATIENT)
Age: 66
Setting detail: OUTPATIENT SURGERY
Discharge: HOME | End: 2021-11-12
Attending: PODIATRIST | Admitting: PODIATRIST
Payer: COMMERCIAL

## 2021-11-12 ENCOUNTER — ANESTHESIA (OUTPATIENT)
Dept: SURGERY | Age: 66
End: 2021-11-12
Payer: COMMERCIAL

## 2021-11-12 VITALS
BODY MASS INDEX: 33.75 KG/M2 | WEIGHT: 190.48 LBS | DIASTOLIC BLOOD PRESSURE: 57 MMHG | OXYGEN SATURATION: 93 % | HEIGHT: 63 IN | HEART RATE: 61 BPM | TEMPERATURE: 97.3 F | SYSTOLIC BLOOD PRESSURE: 123 MMHG | RESPIRATION RATE: 17 BRPM

## 2021-11-12 LAB
GLUCOSE BLD STRIP.AUTO-MCNC: 111 MG/DL (ref 65–117)
GLUCOSE BLD STRIP.AUTO-MCNC: 120 MG/DL (ref 65–117)
SERVICE CMNT-IMP: ABNORMAL
SERVICE CMNT-IMP: NORMAL

## 2021-11-12 PROCEDURE — C1713 ANCHOR/SCREW BN/BN,TIS/BN: HCPCS | Performed by: PODIATRIST

## 2021-11-12 PROCEDURE — 82962 GLUCOSE BLOOD TEST: CPT

## 2021-11-12 PROCEDURE — 74011000250 HC RX REV CODE- 250: Performed by: ANESTHESIOLOGY

## 2021-11-12 PROCEDURE — 76060000034 HC ANESTHESIA 1.5 TO 2 HR: Performed by: PODIATRIST

## 2021-11-12 PROCEDURE — 77030040922 HC BLNKT HYPOTHRM STRY -A

## 2021-11-12 PROCEDURE — 74011250636 HC RX REV CODE- 250/636: Performed by: ANESTHESIOLOGY

## 2021-11-12 PROCEDURE — 77030039825 HC MSK NSL PAP SUPERNO2VA VYRM -B: Performed by: ANESTHESIOLOGY

## 2021-11-12 PROCEDURE — 76210000020 HC REC RM PH II FIRST 0.5 HR: Performed by: PODIATRIST

## 2021-11-12 PROCEDURE — 74011000250 HC RX REV CODE- 250: Performed by: PODIATRIST

## 2021-11-12 PROCEDURE — 77030031139 HC SUT VCRL2 J&J -A: Performed by: PODIATRIST

## 2021-11-12 PROCEDURE — 2709999900 HC NON-CHARGEABLE SUPPLY: Performed by: PODIATRIST

## 2021-11-12 PROCEDURE — 76210000006 HC OR PH I REC 0.5 TO 1 HR: Performed by: PODIATRIST

## 2021-11-12 PROCEDURE — 77030006788 HC BLD SAW OSC STRY -B: Performed by: PODIATRIST

## 2021-11-12 PROCEDURE — 76010000153 HC OR TIME 1.5 TO 2 HR: Performed by: PODIATRIST

## 2021-11-12 PROCEDURE — 77030020754 HC CUF TRNQT 2BLA STRY -B: Performed by: PODIATRIST

## 2021-11-12 PROCEDURE — C1769 GUIDE WIRE: HCPCS | Performed by: PODIATRIST

## 2021-11-12 PROCEDURE — 74011250636 HC RX REV CODE- 250/636: Performed by: PODIATRIST

## 2021-11-12 PROCEDURE — 77030036687 HC SHOE PSTOP S2SG -A

## 2021-11-12 PROCEDURE — 77030002986 HC SUT PROL J&J -A: Performed by: PODIATRIST

## 2021-11-12 DEVICE — SCREW, HEADLESS 3.0X22MM_TI GREEN
Type: IMPLANTABLE DEVICE | Site: FOOT | Status: FUNCTIONAL
Brand: VILEX DUAL THREAD SCREW

## 2021-11-12 DEVICE — SCREW, HEADLESS 2.0X20MM_TI GREEN
Type: IMPLANTABLE DEVICE | Site: FOOT | Status: FUNCTIONAL
Brand: VILEX DUAL THREAD SCREW

## 2021-11-12 RX ORDER — FENTANYL CITRATE 50 UG/ML
INJECTION, SOLUTION INTRAMUSCULAR; INTRAVENOUS AS NEEDED
Status: DISCONTINUED | OUTPATIENT
Start: 2021-11-12 | End: 2021-11-12 | Stop reason: HOSPADM

## 2021-11-12 RX ORDER — DEXAMETHASONE SODIUM PHOSPHATE 4 MG/ML
4 INJECTION, SOLUTION INTRA-ARTICULAR; INTRALESIONAL; INTRAMUSCULAR; INTRAVENOUS; SOFT TISSUE ONCE
Status: COMPLETED | OUTPATIENT
Start: 2021-11-12 | End: 2021-11-12

## 2021-11-12 RX ORDER — BUPIVACAINE HYDROCHLORIDE 5 MG/ML
10 INJECTION, SOLUTION EPIDURAL; INTRACAUDAL
Status: COMPLETED | OUTPATIENT
Start: 2021-11-12 | End: 2021-11-12

## 2021-11-12 RX ORDER — PROPOFOL 10 MG/ML
INJECTION, EMULSION INTRAVENOUS AS NEEDED
Status: DISCONTINUED | OUTPATIENT
Start: 2021-11-12 | End: 2021-11-12 | Stop reason: HOSPADM

## 2021-11-12 RX ORDER — LIDOCAINE HYDROCHLORIDE 20 MG/ML
10 INJECTION, SOLUTION INFILTRATION; PERINEURAL ONCE
Status: COMPLETED | OUTPATIENT
Start: 2021-11-12 | End: 2021-11-12

## 2021-11-12 RX ORDER — SODIUM CHLORIDE, SODIUM LACTATE, POTASSIUM CHLORIDE, CALCIUM CHLORIDE 600; 310; 30; 20 MG/100ML; MG/100ML; MG/100ML; MG/100ML
INJECTION, SOLUTION INTRAVENOUS
Status: DISCONTINUED | OUTPATIENT
Start: 2021-11-12 | End: 2021-11-12 | Stop reason: HOSPADM

## 2021-11-12 RX ORDER — ONDANSETRON 2 MG/ML
INJECTION INTRAMUSCULAR; INTRAVENOUS AS NEEDED
Status: DISCONTINUED | OUTPATIENT
Start: 2021-11-12 | End: 2021-11-12 | Stop reason: HOSPADM

## 2021-11-12 RX ORDER — MIDAZOLAM HYDROCHLORIDE 1 MG/ML
INJECTION, SOLUTION INTRAMUSCULAR; INTRAVENOUS AS NEEDED
Status: DISCONTINUED | OUTPATIENT
Start: 2021-11-12 | End: 2021-11-12 | Stop reason: HOSPADM

## 2021-11-12 RX ORDER — BUPIVACAINE HYDROCHLORIDE 5 MG/ML
10 INJECTION, SOLUTION EPIDURAL; INTRACAUDAL ONCE
Status: COMPLETED | OUTPATIENT
Start: 2021-11-12 | End: 2021-11-12

## 2021-11-12 RX ORDER — SODIUM CHLORIDE, SODIUM LACTATE, POTASSIUM CHLORIDE, CALCIUM CHLORIDE 600; 310; 30; 20 MG/100ML; MG/100ML; MG/100ML; MG/100ML
50 INJECTION, SOLUTION INTRAVENOUS CONTINUOUS
Status: DISCONTINUED | OUTPATIENT
Start: 2021-11-12 | End: 2021-11-12 | Stop reason: HOSPADM

## 2021-11-12 RX ORDER — LIDOCAINE HYDROCHLORIDE 20 MG/ML
INJECTION, SOLUTION EPIDURAL; INFILTRATION; INTRACAUDAL; PERINEURAL AS NEEDED
Status: DISCONTINUED | OUTPATIENT
Start: 2021-11-12 | End: 2021-11-12 | Stop reason: HOSPADM

## 2021-11-12 RX ORDER — PROPOFOL 10 MG/ML
INJECTION, EMULSION INTRAVENOUS
Status: DISCONTINUED | OUTPATIENT
Start: 2021-11-12 | End: 2021-11-12 | Stop reason: HOSPADM

## 2021-11-12 RX ADMIN — ONDANSETRON HYDROCHLORIDE 4 MG: 2 INJECTION, SOLUTION INTRAMUSCULAR; INTRAVENOUS at 10:37

## 2021-11-12 RX ADMIN — PROPOFOL 50 MG: 10 INJECTION, EMULSION INTRAVENOUS at 09:40

## 2021-11-12 RX ADMIN — MIDAZOLAM 1 MG: 1 INJECTION INTRAMUSCULAR; INTRAVENOUS at 09:27

## 2021-11-12 RX ADMIN — PROPOFOL 50 MG: 10 INJECTION, EMULSION INTRAVENOUS at 09:38

## 2021-11-12 RX ADMIN — PROPOFOL 75 MCG/KG/MIN: 10 INJECTION, EMULSION INTRAVENOUS at 09:38

## 2021-11-12 RX ADMIN — WATER 2 G: 1 INJECTION INTRAMUSCULAR; INTRAVENOUS; SUBCUTANEOUS at 09:52

## 2021-11-12 RX ADMIN — SODIUM CHLORIDE, POTASSIUM CHLORIDE, SODIUM LACTATE AND CALCIUM CHLORIDE 50 ML/HR: 600; 310; 30; 20 INJECTION, SOLUTION INTRAVENOUS at 09:27

## 2021-11-12 RX ADMIN — SODIUM CHLORIDE, POTASSIUM CHLORIDE, SODIUM LACTATE AND CALCIUM CHLORIDE: 600; 310; 30; 20 INJECTION, SOLUTION INTRAVENOUS at 09:27

## 2021-11-12 RX ADMIN — PROPOFOL 50 MG: 10 INJECTION, EMULSION INTRAVENOUS at 09:45

## 2021-11-12 RX ADMIN — FENTANYL CITRATE 50 MCG: 50 INJECTION, SOLUTION INTRAMUSCULAR; INTRAVENOUS at 09:55

## 2021-11-12 RX ADMIN — FENTANYL CITRATE 50 MCG: 50 INJECTION, SOLUTION INTRAMUSCULAR; INTRAVENOUS at 09:40

## 2021-11-12 RX ADMIN — LIDOCAINE HYDROCHLORIDE 60 MG: 20 INJECTION, SOLUTION EPIDURAL; INFILTRATION; INTRACAUDAL; PERINEURAL at 09:38

## 2021-11-12 RX ADMIN — MIDAZOLAM 1 MG: 1 INJECTION INTRAMUSCULAR; INTRAVENOUS at 09:35

## 2021-11-12 RX ADMIN — PROPOFOL 50 MG: 10 INJECTION, EMULSION INTRAVENOUS at 09:43

## 2021-11-12 NOTE — BRIEF OP NOTE
Brief Postoperative Note    Patient: Edie Weiss  YOB: 1955  MRN: [id number]    Date of Procedure: 11/12/2021     Pre-Op Diagnosis: LEFT FOOT BUNION with hallux abducto valgus causing pain and swelling    Post-Op Diagnosis: Same as preoperative diagnosis. Procedure(s):  LEFT FOOT BUNIONECTOMY WITH 1ST METATARSAL OSTEOTOMY WITH FIXATION    Surgeon(s):  Madisyn Hernandez DPM    Surgical Assistant: None    Anesthesia: MAC     Estimated Blood Loss (mL): Minimal    Complications: None    Specimens: * No specimens in log *     Implants:   Implant Name Type Inv. Item Serial No.  Lot No. LRB No. Used Action   SCREW BNE L22MM DIA3MM TI NATHALIE COMPR HDLSS 2 THRD - SNA  SCREW BNE L22MM DIA3MM TI NATHALIE COMPR HDLSS 2 THRD NA [employer] NA Left 1 Implanted   SCREW NATHALIE COMPR HDLSS 2 THRD TI [id number] - SNA  SCREW NATHALIE COMPR HDLSS 2 THRD TI [id number] NA [employer] NA Left 1 Implanted       Drains: * No LDAs found *    Findings: Patient tolerated the procedure and anesthesia well and left the operating room with all vital signs stable and vascular intact left foot.       Electronically Signed by Heather Krishnan DPM on 11/12/2021 at 11:07 AM

## 2021-11-12 NOTE — PERIOP NOTES
Discharge instructions and medications reviewed with patient and sister Jenny Morocho. Opportunity was given for patient and responsible party to ask questions. No further questions at this time. Responsible party and patient verbalizes understanding of discharge instructions. A Copy of discharge instructions given to patient. Patient discharged with all belongings.        Patient ambulated in PACU with post op shoe, tolerated well

## 2021-11-12 NOTE — ANESTHESIA POSTPROCEDURE EVALUATION
Procedure(s):  LEFT FOOT BUNIONECTOMY WITH 1ST METATARSAL OSTEOTOMY WITH FIXATION. MAC, general - backup    Anesthesia Post Evaluation      Multimodal analgesia: multimodal analgesia used between 6 hours prior to anesthesia start to PACU discharge  Patient location during evaluation: PACU  Patient participation: complete - patient participated  Level of consciousness: awake and alert  Pain management: adequate  Airway patency: patent  Anesthetic complications: no  Respiratory status: acceptable  Hydration status: acceptable  Comments: Seen, no complaints   Post anesthesia nausea and vomiting:  none  Final Post Anesthesia Temperature Assessment:  Normothermia (36.0-37.5 degrees C)      INITIAL Post-op Vital signs:   Vitals Value Taken Time   /57 11/12/21 1130   Temp 36.6 °C (97.8 °F) 11/12/21 1108   Pulse 62 11/12/21 1134   Resp 14 11/12/21 1134   SpO2 95 % 11/12/21 1134   Vitals shown include unvalidated device data.

## 2021-11-12 NOTE — DISCHARGE INSTRUCTIONS
See Dr. Carrion Collins discharge instruction sheet.     ______________________________________________________________________    Anesthesia Discharge Instructions    After general anesthesia or intervenous sedation, for 24 hours or while taking prescription Narcotics:  · Limit your activities  · Do not drive or operate hazardous machinery  · If you have not urinated within 8 hours after discharge, please contact your surgeon on call. · Do not make important personal or business decisions  · Do not drink alcoholic beverages    Report the following to your surgeon:  · Excessive pain, swelling, redness or odor of or around the surgical area  · Temperature over 100.5 degrees  · Nausea and vomiting lasting longer than 4 hours or if unable to take medication  · Any signs of decreased circulation or nerve impairment to extremity:  Change in color, persistent numbness, tingling, coldness or increased pain.   · Any questions

## 2021-11-13 NOTE — OP NOTES
[address]   OPERATIVE REPORT    Name:  Lita Hatch  MR#:  [id number]  :  1955  ACCOUNT #:  [de-identified]  DATE OF SERVICE:  2021      PREOPERATIVE DIAGNOSIS:  Left foot bunion deformity with hallux abductovalgus causing pain and swelling. POSTOPERATIVE DIAGNOSIS:  Left foot bunion deformity with hallux abductovalgus causing pain and swelling. PROCEDURE PERFORMED:  Left foot bunionectomy with first metatarsal osteotomy with fixation with one 2.0 Vilex 20-mm cannulated screw fixation and one 3.0 Vilex 22-mm cannulated screw fixation. SURGEON:  Jaden Dodge DPM    ASSISTANT:  None. ANESTHESIA:   IV sedation with local.    COMPLICATIONS:  None. SPECIMENS REMOVED:  No pathology. IMPLANTS:  One 2.0 Vilex screw fixation and one 3.0 Vilex screw fixation. ESTIMATED BLOOD LOSS:  Less than 5 mL. TOURNIQUET:  Left ankle tourniquet 250 mmHg. INDICATIONS:  The patient has been dealing with left first joint bunion pain for many years, has gotten progressively worse. It really got worse when she actually caused a fracture to the navicular bone and hurt her foot and the joint just kept getting worse and worse, knew she had to do something. She has tried  [employer] offloading shoes, ice, anti-inflammatories, Voltaren gel, wearing rockerbottom shoes and nothing has really helped at this point to stop the discomfort. The patient liked to have surgery to correct the problem. PROCEDURE:  The patient was brought to the operating room, placed on the operating table in the supine position. 2 g Ancef was given preoperatively before the procedure started. Next, the left foot was prepped and draped in the usual sterile manner and a 50/50 mixture of 0.5% Marcaine plain and 1% lidocaine plain was injected 20 mL of a 50/50 mixture into the left first ray for anesthesia.   The left foot was then prepped and draped in the usual sterile manner and the left foot was elevated and exsanguinated with an Esmarch and the left ankle tourniquet elevated to 250 mmHg. Before the tourniquet was elevated and the procedure started, we did do a time-out and we all agreed it was the correct patient and the procedure for the left foot. Next, a curvilinear incision was made along the medial side of the first metatarsal joint. Incision was deepened down through subcutaneous tissue and any bleeding vessels cauterized as necessary. The attention was directed to first interspace where a vertical capsulotomy was performed to release fibular sesamoid laterally as it was very tight and then did a release of the deep ligament because the patient was tracking into the second digit. The hallux was tracking into the second digit. Next, attention was directed to the medial side where a linear incision was done into the capsule to expose the first metatarsal joint. McGlamry elevator was put underneath the joint. There were just adhesions along the lateral side where everything kept tracking to the hallux and this was released which did help the sesamoids release a lot. Next, examining the cartilage, there was some severe damage more plantarly, the medial aspect had no cartilage, about 60% left into the main head of the first metatarsal.  There was some erosion also dorsally. More cartilage was basically center to lateral.  Next, I put a K-wire across the first metatarsal, so I would hopefully slightly plantar flex into that joint and use the main cartilage versus the first metatarsal elevating up. I did a short dorsal arm and a much longer plantar arm and then took about a millimeter wedge out of the dorsal aspect to slightly just decompress. Shifted the capital fragment at least 3-4 mm which really brought the main cartilage under the head, the base of the proximal phalanx of the hallux, then put a temporary fixation across the osteotomy and did plantar flex about a millimeter.   There were no signs of gapping in the osteotomy. Next, put a 22-mm 3.0 cannulated Vilex screw fixation across the osteotomy which was very stable. The bone was actually very healthy and not mushy which was great and then took out any temporary K-wires and then put a 2.0 cannulated screw 20 mm across the osteotomy for fixation. The patient had good stable osteotomy with no gapping. Tourniquet was released. Wound was flushed with copious amounts of sterile saline. The capsule was reapproximated with 3-0 Vicryl, subcutaneous with 4-0 Vicryl and the skin with 4-0 Prolene in a subcuticular fashion. 10 mL of 0.5% Marcaine plain was injected in the first ray for anesthesia and 1 mL of dexamethasone into the sesamoids and first joint to help plantarly and into the interspace where she was having a lot of swelling. Xeroform, 4 x 4's, Kerlix, and Coban were placed around the left foot. The patient tolerated the procedure and anesthesia well and left the operating room with all vital signs stable and vascular status intact to the left foot. The patient is well aware of recovery, will be resting, elevating and icing all weekend. Does have antibiotic to start tomorrow Keflex and does have the next 3-month postop appointments ready. We will call the patient tonight to see how she is doing.         YUDITH [email or web address]  D:  11/12/2021 11:20  T:  11/12/2021 21:38  JOB #:  [id number]

## 2021-11-24 ENCOUNTER — TELEPHONE (OUTPATIENT)
Dept: FAMILY MEDICINE CLINIC | Age: 66
End: 2021-11-24

## 2021-11-24 NOTE — TELEPHONE ENCOUNTER
I called pt to clarify her request. She states insurance denied payment on labs from 10/20 visit b/c there was no ordering doctor's name. I gave her [facility] Billing office number to clarify. I am not able to do anything from my end. She will call Lab billing office.

## 2021-11-24 NOTE — TELEPHONE ENCOUNTER
----- Message from [facility] sent at 11/24/2021 10:35 AM EST -----  Regarding: FW: Resubmit claim    ----- Message -----  From: [facility]  Sent: 11/24/2021  10:34 AM EST  To: [facility] Nurse Pool  Subject: Resubmit claim                                   Please  resubmit the lab work for November 10/20 for pre op. Insurance needs the submitting doctor.  Thanks

## 2022-01-09 DIAGNOSIS — E11.9 TYPE 2 DIABETES MELLITUS WITHOUT COMPLICATION, WITHOUT LONG-TERM CURRENT USE OF INSULIN (HCC): ICD-10-CM

## 2022-01-10 RX ORDER — METFORMIN HYDROCHLORIDE 500 MG/1
TABLET, EXTENDED RELEASE ORAL
Qty: 360 TABLET | Refills: 3 | Status: SHIPPED | OUTPATIENT
Start: 2022-01-10

## 2022-01-18 ENCOUNTER — TELEPHONE (OUTPATIENT)
Dept: FAMILY MEDICINE CLINIC | Age: 67
End: 2022-01-18

## 2022-01-18 DIAGNOSIS — Z85.3 HX: BREAST CANCER: Primary | ICD-10-CM

## 2022-01-18 NOTE — TELEPHONE ENCOUNTER
----- Message from [facility] sent at 1/18/2022 11:55 AM EST -----  Regarding: FW: Prescription    ----- Message -----  From: Ferdinand Massed  Sent: 1/18/2022  11:37 AM EST  To: [facility] Nurse Pool  Subject: Prescription                                     Happy New Year. Can you please send a prescription to Gabrielle Fallon. For my protesis and bras? Her fax number is [phone].  Isidro 72

## 2022-01-21 DIAGNOSIS — E11.9 TYPE 2 DIABETES MELLITUS WITHOUT COMPLICATION, WITHOUT LONG-TERM CURRENT USE OF INSULIN (HCC): ICD-10-CM

## 2022-01-21 RX ORDER — LIRAGLUTIDE 6 MG/ML
INJECTION SUBCUTANEOUS
Qty: 27 ML | Refills: 1 | Status: SHIPPED | OUTPATIENT
Start: 2022-01-21 | End: 2022-07-22

## 2022-02-03 ENCOUNTER — OFFICE VISIT (OUTPATIENT)
Dept: FAMILY MEDICINE CLINIC | Age: 67
End: 2022-02-03
Payer: COMMERCIAL

## 2022-02-03 VITALS
DIASTOLIC BLOOD PRESSURE: 84 MMHG | BODY MASS INDEX: 33.27 KG/M2 | HEIGHT: 63 IN | OXYGEN SATURATION: 97 % | HEART RATE: 63 BPM | WEIGHT: 187.8 LBS | TEMPERATURE: 98 F | SYSTOLIC BLOOD PRESSURE: 128 MMHG | RESPIRATION RATE: 16 BRPM

## 2022-02-03 DIAGNOSIS — Z79.899 ENCOUNTER FOR LONG-TERM CURRENT USE OF MEDICATION: ICD-10-CM

## 2022-02-03 DIAGNOSIS — E78.00 HYPERCHOLESTEROLEMIA: ICD-10-CM

## 2022-02-03 DIAGNOSIS — E03.9 ACQUIRED HYPOTHYROIDISM: ICD-10-CM

## 2022-02-03 DIAGNOSIS — R10.13 EPIGASTRIC PAIN: ICD-10-CM

## 2022-02-03 DIAGNOSIS — E55.9 VITAMIN D DEFICIENCY: ICD-10-CM

## 2022-02-03 DIAGNOSIS — I10 PRIMARY HYPERTENSION: ICD-10-CM

## 2022-02-03 DIAGNOSIS — E11.9 TYPE 2 DIABETES MELLITUS WITHOUT COMPLICATION, WITHOUT LONG-TERM CURRENT USE OF INSULIN (HCC): Primary | ICD-10-CM

## 2022-02-03 PROCEDURE — 99214 OFFICE O/P EST MOD 30 MIN: CPT | Performed by: FAMILY MEDICINE

## 2022-02-03 NOTE — PATIENT INSTRUCTIONS
Diabetes Foot Health: Care Instructions  Your Care Instructions     When you have diabetes, your feet need extra care and attention. Diabetes can damage the nerve endings and blood vessels in your feet, making you less likely to notice when your feet are injured. Diabetes also limits your body's ability to fight infection and get blood to areas that need it. If you get a minor foot injury, it could become an ulcer or a serious infection. With good foot care, you can prevent most of these problems. Caring for your feet can be quick and easy. Most of the care can be done when you are bathing or getting ready for bed. Follow-up care is a key part of your treatment and safety. Be sure to make and go to all appointments, and call your doctor if you are having problems. It's also a good idea to know your test results and keep a list of the medicines you take. How can you care for yourself at home? · Keep your blood sugar close to normal by watching what and how much you eat, monitoring blood sugar, taking medicines if prescribed, and getting regular exercise. · Do not smoke. Smoking affects blood flow and can make foot problems worse. If you need help quitting, talk to your doctor about stop-smoking programs and medicines. These can increase your chances of quitting for good. · Eat a diet that is low in fats. High fat intake can cause fat to build up in your blood vessels and decrease blood flow. · Inspect your feet daily for blisters, cuts, cracks, or sores. If you cannot see well, use a mirror or have someone help you. · Take care of your feet:  ? Wash your feet every day. Use warm (not hot) water. Check the water temperature with your wrists or other part of your body, not your feet. ? Dry your feet well. Pat them dry. Do not rub the skin on your feet too hard. Dry well between your toes. If the skin on your feet stays moist, bacteria or a fungus can grow, which can lead to infection. ?  Keep your skin soft. Use moisturizing skin cream to keep the skin on your feet soft and prevent calluses and cracks. But do not put the cream between your toes, and stop using any cream that causes a rash. ? Clean underneath your toenails carefully. Do not use a sharp object to clean underneath your toenails. Use the blunt end of a nail file or other rounded tool. ? Trim and file your toenails straight across to prevent ingrown toenails. Use a nail clipper, not scissors. Use an emery board to smooth the edges. · Change socks daily. Socks without seams are best, because seams often rub the feet. You can find socks for people with diabetes from specialty catalogs. · Look inside your shoes every day for things like gravel or torn linings, which could cause blisters or sores. · Buy shoes that fit well:  ? Look for shoes that have plenty of space around the toes. This helps prevent bunions and blisters. ? Try on shoes while wearing the kind of socks you will usually wear with the shoes. ? Avoid plastic shoes. They may rub your feet and cause blisters. Good shoes should be made of materials that are flexible and breathable, such as leather or cloth. ? Break in new shoes slowly by wearing them for no more than an hour a day for several days. Take extra time to check your feet for red areas, blisters, or other problems after you wear new shoes. · Do not go barefoot. Do not wear sandals, and do not wear shoes with very thin soles. Thin soles are easy to puncture. They also do not protect your feet from hot pavement or cold weather. · Have your doctor check your feet during each visit. If you have a foot problem, see your doctor. Do not try to treat an early foot problem at home. Home remedies or treatments that you can buy without a prescription (such as corn removers) can be harmful. · Always get early treatment for foot problems. A minor irritation can lead to a major problem if not properly cared for early.   When should you call for help? Call your doctor now or seek immediate medical care if:    · You have a foot sore, an ulcer or break in the skin that is not healing after 4 days, bleeding corns or calluses, or an ingrown toenail.     · You have blue or black areas, which can mean bruising or blood flow problems.     · You have peeling skin or tiny blisters between your toes or cracking or oozing of the skin.     · You have a fever for more than 24 hours and a foot sore.     · You have new numbness or tingling in your feet that does not go away after you move your feet or change positions.     · You have unexplained or unusual swelling of the foot or ankle. Watch closely for changes in your health, and be sure to contact your doctor if:    · You cannot do proper foot care. Where can you learn more? Go to http://www.gray.com/  Enter A739 in the search box to learn more about \"Diabetes Foot Health: Care Instructions. \"  Current as of: August 31, 2020               Content Version: 13.0  © 2006-2021 [employer]. Care instructions adapted under license by [employer] (which disclaims liability or warranty for this information). If you have questions about a medical condition or this instruction, always ask your healthcare professional. [address] any warranty or liability for your use of this information.

## 2022-02-03 NOTE — PROGRESS NOTES
Subjective: Ceasar Betancourt is a 73 y.o. female who presents for follow up of diabetes, hypertension and hyperlipidemia. Diet and Lifestyle: generally follows a low fat low cholesterol diet, generally follows a low sodium diet  Home BP Monitoring: is not measured at home    Cardiovascular ROS: taking medications as instructed, no medication side effects noted, no TIA's, no chest pain on exertion, no dyspnea on exertion, no swelling of ankles. New concerns: 2 weeks ago got sick after eating bread. Vomited for few days. Gets nausea after eating, jonathan bread. Pain in upper abdomen. Normal stools. Patient Active Problem List    Diagnosis Date Noted    Bunion of left foot 11/11/2021    Hallux abducto valgus, left 11/11/2021    Vitamin D deficiency 07/22/2021    Encounter for long-term current use of medication 11/10/2020    Diabetes ([facility] [address] 12/10/2010    Hypercholesterolemia 12/10/2010    Hypothyroid 12/10/2010    HX: breast cancer 12/10/2010    Environmental allergies 12/10/2010    Arthritis 12/10/2010    Sleep apnea 12/10/2010    Fatty liver 12/10/2010     Current Outpatient Medications   Medication Sig Dispense Refill    liraglutide (Victoza 3-Cachorro) 0.6 mg/0.1 mL (18 mg/3 mL) pnij INJECT 1.8MG SUBCUTANEOUSLYDAILY 27 mL 1    metFORMIN ER (GLUCOPHAGE XR) 500 mg tablet TAKE 2 TABLETS IN THE      MORNING AND 2 TABLETS IN   THE EVENING 360 Tablet 3    carisoprodoL (SOMA) 250 mg tablet TAKE 1 TABLET BY MOUTH 4 TIMES A DAY AS NEEDED FOR MUSCLE SPASM 90 Tablet 1    lisinopriL (PRINIVIL, ZESTRIL) 5 mg tablet Take 1 Tablet by mouth daily. 90 Tablet 3    ezetimibe (ZETIA) 10 mg tablet TAKE 1 TABLET DAILY 90 Tablet 1    levothyroxine (Synthroid) 200 mcg tablet Take 1 Tablet by mouth Daily (before breakfast). 90 Tablet 3    colesevelam (WELCHOL) 625 mg tablet Take 3 Tablets by mouth two (2) times daily (with meals).  540 Tablet 3    Insulin Needles, Disposable, (BD Melony 2nd Gen Pen Needle) 32 gauge x 5/32\" ndle Use 1 daily for Victoza pen. 100 Pen Needle 3    venlafaxine-SR (EFFEXOR-XR) 75 mg capsule Take 1 Cap by mouth daily. 90 Cap 3    Omeprazole delayed release (PRILOSEC D/R) 20 mg tablet Take 1 Tab by mouth daily. 90 Tab 1    albuterol (PROVENTIL HFA, VENTOLIN HFA, PROAIR HFA) 90 mcg/actuation inhaler Take 2 Puffs by inhalation every four (4) hours as needed for Wheezing. PRO AIR HFA 3 Inhaler 1    cholecalciferol, vitamin D3, (VITAMIN D3) 4,000 unit cap Take 4,000 Units by mouth daily. (Patient taking differently: Take 5,000 Units by mouth daily.) 90 Cap 5    diclofenac EC (VOLTAREN) 75 mg EC tablet TAKE 1 TABLET TWICE A DAY AS NEEDED 180 Tab 1    mometasone (NASONEX) 50 mcg/actuation nasal spray 2 Sprays daily.  fluticasone (FLONASE) 50 mcg/actuation nasal spray USE 2 SPRAYS TO BOTH NOSTRIL ONCE DAILY 16 g 5    milk thistle 500 mg cap Take 500 mg by mouth daily. 60 Cap 0    glucose blood VI test strips (ONE TOUCH ULTRA TEST) strip Use as directed 1 Package 11    aspirin delayed-release 81 mg tablet Take  by mouth daily.  cyanocobalamin (VITAMIN B-12) 2,500 mcg Subl Take 2,500 mg by mouth daily.  CALCIUM CARBONATE/VITAMIN D3 (CALCIUM 600 + D,3, PO) Take  by mouth.  magnesium 250 mg Tab Take  by mouth.  OMEGA-3 FATTY ACIDS (OMEGA 3 PO) Take 4 Tabs by mouth daily.  cetirizine (ZyrTEC) 10 mg tablet Take 10 mg by mouth daily.        Allergies   Allergen Reactions    Crestor [Rosuvastatin] Other (comments)     Increased CK    Simvastatin Myalgia    Tree And Shrub Pollen Sneezing    Bee Pollen Itching     Seasonal allergies    Mite Extract Itching     Dust Mites     Past Medical History:   Diagnosis Date    Arthritis 12/10/2010    Breast cancer ([facility] [address]     removed 2/2/09    Diabetes ([facility] [address]     Fatty liver 12/10/2010    Hypercholesterolemia     Sleep apnea 12/10/2010    Thyroid disease      Past Surgical History:   Procedure Laterality Date    ENDOSCOPY, COLON, DIAGNOSTIC  1/2009    HX BREAST LUMPECTOMY  2/2/09    reincision    HX CATARACT REMOVAL Right 11/2016    HX CATARACT REMOVAL Left 11/2016    HX GYN  2010    cervical polypectomy    HX HEENT      HX LITHOTRIPSY  1994    HX ORTHOPAEDIC  11/12/2021    foot left    HX TONSIL AND ADENOIDECTOMY  1960     Family History   Problem Relation Age of Onset    Cancer Mother     COPD Mother    Gilbert Cancer Father         mesothelioma    Arthritis-rheumatoid Sister      Social History     Tobacco Use    Smoking status: Never Smoker    Smokeless tobacco: Never Used   Substance Use Topics    Alcohol use: No     Alcohol/week: 0.0 standard drinks             Review of Systems, additional:  Pertinent items are noted in HPI. Objective:     Visit Vitals  BP (!) 118/59 (BP 1 Location: Left arm, BP Patient Position: Sitting, BP Cuff Size: Small adult)   Pulse 63   Temp 98 °F (36.7 °C) (Oral)   Resp 16   Ht 5' 3\" (1.6 m)   Wt 187 lb 12.8 oz (85.2 kg)   SpO2 97%   BMI 33.27 kg/m²     Appearance: alert, well appearing, and in no distress and overweight. General exam: CVS exam BP noted to be well controlled today in office, S1, S2 normal, no gallop, no murmur, chest clear, no JVD, no HSM, no edema. Lab review: orders written for new lab studies as appropriate; see orders. Assessment/Plan:     diabetes , hypertension stable, hyperlipidemia . orders and follow up as documented in patient record. ICD-10-CM ICD-9-CM    1. Type 2 diabetes mellitus without complication, without long-term current use of insulin (HCC)  E11.9 250.00 LIPID PANEL      HEMOGLOBIN A1C WITH EAG      HEMOGLOBIN A1C WITH EAG      LIPID PANEL   2. Primary hypertension  I10 401.9    3. Hypercholesterolemia  E78.00 272.0    4. Acquired hypothyroidism  E03.9 244.9 TSH 3RD GENERATION      T4, FREE      T4, FREE      TSH 3RD GENERATION   5. Vitamin D deficiency  E55.9 268.9 VITAMIN D, 25 HYDROXY      VITAMIN D, 25 HYDROXY   6.  Encounter for long-term current use of medication  T38.326 B57.21 METABOLIC PANEL, COMPREHENSIVE      CBC WITH AUTOMATED DIFF      CBC WITH AUTOMATED DIFF      METABOLIC PANEL, COMPREHENSIVE   7. Epigastric pain  R10.13 789.06 US ABD COMP     Order labs.   Order US  Plan increase Omeprazole to BID

## 2022-02-03 NOTE — PROGRESS NOTES
Chief Complaint   Patient presents with    Diabetes    Abdominal Pain    Nausea     3 most recent PHQ Screens 2/3/2022   Little interest or pleasure in doing things Several days   Feeling down, depressed, irritable, or hopeless Several days   Total Score PHQ 2 2     Abuse Screening Questionnaire 2/3/2022   Do you ever feel afraid of your partner? N   Are you in a relationship with someone who physically or mentally threatens you? N   Is it safe for you to go home? Y     Visit Vitals  BP (!) 118/59 (BP 1 Location: Left arm, BP Patient Position: Sitting, BP Cuff Size: Small adult)   Pulse 63   Temp 98 °F (36.7 °C) (Oral)   Resp 16   Ht 5' 3\" (1.6 m)   Wt 187 lb 12.8 oz (85.2 kg)   SpO2 97%   BMI 33.27 kg/m²     1. Have you been to the ER, urgent care clinic since your last visit? Hospitalized since your last visit?no    2. Have you seen or consulted any other health care providers outside of the [address] [place] [address] since your last visit? Include any pap smears or colon screening.  Podiatry

## 2022-02-04 LAB
25(OH)D3 SERPL-MCNC: 85.2 NG/ML (ref 30–100)
ALBUMIN SERPL-MCNC: 3.7 G/DL (ref 3.5–5)
ALBUMIN/GLOB SERPL: 1.2 {RATIO} (ref 1.1–2.2)
ALP SERPL-CCNC: 62 U/L (ref 45–117)
ALT SERPL-CCNC: 88 U/L (ref 12–78)
ANION GAP SERPL CALC-SCNC: 5 MMOL/L (ref 5–15)
AST SERPL-CCNC: 65 U/L (ref 15–37)
BASOPHILS # BLD: 0.1 K/UL (ref 0–0.1)
BASOPHILS NFR BLD: 1 % (ref 0–1)
BILIRUB SERPL-MCNC: 0.4 MG/DL (ref 0.2–1)
BUN SERPL-MCNC: 10 MG/DL (ref 6–20)
BUN/CREAT SERPL: 14 (ref 12–20)
CALCIUM SERPL-MCNC: 9.9 MG/DL (ref 8.5–10.1)
CHLORIDE SERPL-SCNC: 106 MMOL/L (ref 97–108)
CHOLEST SERPL-MCNC: 195 MG/DL
CO2 SERPL-SCNC: 27 MMOL/L (ref 21–32)
CREAT SERPL-MCNC: 0.72 MG/DL (ref 0.55–1.02)
DIFFERENTIAL METHOD BLD: ABNORMAL
EOSINOPHIL # BLD: 0.7 K/UL (ref 0–0.4)
EOSINOPHIL NFR BLD: 8 % (ref 0–7)
ERYTHROCYTE [DISTWIDTH] IN BLOOD BY AUTOMATED COUNT: 13.1 % (ref 11.5–14.5)
EST. AVERAGE GLUCOSE BLD GHB EST-MCNC: 151 MG/DL
GLOBULIN SER CALC-MCNC: 3.1 G/DL (ref 2–4)
GLUCOSE SERPL-MCNC: 125 MG/DL (ref 65–100)
HBA1C MFR BLD: 6.9 % (ref 4–5.6)
HCT VFR BLD AUTO: 39.9 % (ref 35–47)
HDLC SERPL-MCNC: 50 MG/DL
HDLC SERPL: 3.9 {RATIO} (ref 0–5)
HGB BLD-MCNC: 13 G/DL (ref 11.5–16)
IMM GRANULOCYTES # BLD AUTO: 0 K/UL (ref 0–0.04)
IMM GRANULOCYTES NFR BLD AUTO: 0 % (ref 0–0.5)
LDLC SERPL CALC-MCNC: 90.8 MG/DL (ref 0–100)
LYMPHOCYTES # BLD: 2.5 K/UL (ref 0.8–3.5)
LYMPHOCYTES NFR BLD: 31 % (ref 12–49)
MCH RBC QN AUTO: 29 PG (ref 26–34)
MCHC RBC AUTO-ENTMCNC: 32.6 G/DL (ref 30–36.5)
MCV RBC AUTO: 89.1 FL (ref 80–99)
MONOCYTES # BLD: 0.6 K/UL (ref 0–1)
MONOCYTES NFR BLD: 7 % (ref 5–13)
NEUTS SEG # BLD: 4.2 K/UL (ref 1.8–8)
NEUTS SEG NFR BLD: 53 % (ref 32–75)
NRBC # BLD: 0 K/UL (ref 0–0.01)
NRBC BLD-RTO: 0 PER 100 WBC
PLATELET # BLD AUTO: 271 K/UL (ref 150–400)
PMV BLD AUTO: 10.1 FL (ref 8.9–12.9)
POTASSIUM SERPL-SCNC: 4.8 MMOL/L (ref 3.5–5.1)
PROT SERPL-MCNC: 6.8 G/DL (ref 6.4–8.2)
RBC # BLD AUTO: 4.48 M/UL (ref 3.8–5.2)
SODIUM SERPL-SCNC: 138 MMOL/L (ref 136–145)
T4 FREE SERPL-MCNC: 1.3 NG/DL (ref 0.8–1.5)
TRIGL SERPL-MCNC: 271 MG/DL (ref ?–150)
TSH SERPL DL<=0.05 MIU/L-ACNC: 1.69 UIU/ML (ref 0.36–3.74)
VLDLC SERPL CALC-MCNC: 54.2 MG/DL
WBC # BLD AUTO: 8 K/UL (ref 3.6–11)

## 2022-02-09 NOTE — PROGRESS NOTES
Normal blood cell counts. Good thyroid level. Vit D is at goal. A1C level is higher but still indicates good diabetes control. Improved cholesterol numbers. Normal kidney tests. Stable liver function. Follow up in 6 months.

## 2022-02-11 ENCOUNTER — HOSPITAL ENCOUNTER (OUTPATIENT)
Dept: ULTRASOUND IMAGING | Age: 67
Discharge: HOME | End: 2022-02-11
Attending: FAMILY MEDICINE
Payer: COMMERCIAL

## 2022-02-11 DIAGNOSIS — R10.13 EPIGASTRIC PAIN: ICD-10-CM

## 2022-02-11 PROCEDURE — 76700 US EXAM ABDOM COMPLETE: CPT

## 2022-03-04 DIAGNOSIS — E78.00 HYPERCHOLESTEROLEMIA: ICD-10-CM

## 2022-03-04 RX ORDER — EZETIMIBE 10 MG/1
TABLET ORAL
Qty: 90 TABLET | Refills: 1 | Status: SHIPPED | OUTPATIENT
Start: 2022-03-04 | End: 2022-08-24

## 2022-03-19 PROBLEM — Z79.899 ENCOUNTER FOR LONG-TERM CURRENT USE OF MEDICATION: Status: ACTIVE | Noted: 2020-11-10

## 2022-03-19 PROBLEM — M21.612 BUNION OF LEFT FOOT: Status: ACTIVE | Noted: 2021-11-11

## 2022-03-20 PROBLEM — M20.12 HALLUX ABDUCTO VALGUS, LEFT: Status: ACTIVE | Noted: 2021-11-11

## 2022-03-20 PROBLEM — E55.9 VITAMIN D DEFICIENCY: Status: ACTIVE | Noted: 2021-07-22

## 2022-04-01 RX ORDER — VENLAFAXINE HYDROCHLORIDE 75 MG/1
CAPSULE, EXTENDED RELEASE ORAL
Qty: 90 CAPSULE | Refills: 3 | Status: SHIPPED | OUTPATIENT
Start: 2022-04-01

## 2022-04-12 ENCOUNTER — TELEPHONE (OUTPATIENT)
Dept: FAMILY MEDICINE CLINIC | Age: 67
End: 2022-04-12

## 2022-04-12 DIAGNOSIS — M19.90 ARTHRITIS: ICD-10-CM

## 2022-04-12 RX ORDER — DICLOFENAC SODIUM 75 MG/1
TABLET, DELAYED RELEASE ORAL
Qty: 180 TABLET | Refills: 1 | Status: SHIPPED | OUTPATIENT
Start: 2022-04-12

## 2022-04-12 NOTE — TELEPHONE ENCOUNTER
Regarding: additional booster  ----- Message from Boy Saenz sent at 4/12/2022 11:06 AM EDT -----       ----- Message from Myrna Oh to Alexus Wisdom MD sent at 2/86/2718 10:45 AM -----   Good morning do you think I should get the second COVID booster? Also can you call in a refill to [facility] for the diclofenac pills?  Thank you

## 2022-04-22 DIAGNOSIS — E11.9 TYPE 2 DIABETES MELLITUS WITHOUT COMPLICATION, WITHOUT LONG-TERM CURRENT USE OF INSULIN (HCC): ICD-10-CM

## 2022-04-22 RX ORDER — PEN NEEDLE, DIABETIC 32GX 5/32"
NEEDLE, DISPOSABLE MISCELLANEOUS
Qty: 100 PEN NEEDLE | Refills: 3 | Status: SHIPPED | OUTPATIENT
Start: 2022-04-22

## 2022-05-08 RX ORDER — COLESEVELAM 180 1/1
TABLET ORAL
Qty: 540 TABLET | Refills: 3 | Status: SHIPPED | OUTPATIENT
Start: 2022-05-08

## 2022-07-12 NOTE — ANESTHESIA PREPROCEDURE EVALUATION
Relevant Problems   No relevant active problems       Anesthetic History   No history of anesthetic complications            Review of Systems / Medical History  Patient summary reviewed, nursing notes reviewed and pertinent labs reviewed    Pulmonary        Sleep apnea: CPAP           Neuro/Psych   Within defined limits           Cardiovascular              Hyperlipidemia    Exercise tolerance: >4 METS     GI/Hepatic/Renal           Liver disease     Endo/Other    Diabetes  Hypothyroidism  Arthritis and cancer     Other Findings              Physical Exam    Airway  Mallampati: II  TM Distance: 4 - 6 cm  Neck ROM: normal range of motion   Mouth opening: Normal     Cardiovascular    Rhythm: regular  Rate: normal         Dental    Dentition: Caps/crowns     Pulmonary  Breath sounds clear to auscultation               Abdominal  GI exam deferred       Other Findings            Anesthetic Plan    ASA: 2  Anesthesia type: MAC and general - backup          Induction: Intravenous  Anesthetic plan and risks discussed with: Patient Principal Discharge DX:	Peritonsillar cellulitis   1 Principal Discharge DX:	Peritonsillar cellulitis  Assessment and plan of treatment:	Plan: decadron, ENT eval.

## 2022-07-22 DIAGNOSIS — E03.9 ACQUIRED HYPOTHYROIDISM: ICD-10-CM

## 2022-07-22 DIAGNOSIS — E11.9 TYPE 2 DIABETES MELLITUS WITHOUT COMPLICATION, WITHOUT LONG-TERM CURRENT USE OF INSULIN (HCC): ICD-10-CM

## 2022-07-22 RX ORDER — LEVOTHYROXINE SODIUM 200 UG/1
TABLET ORAL
Qty: 90 TABLET | Refills: 3 | Status: SHIPPED | OUTPATIENT
Start: 2022-07-22

## 2022-07-22 RX ORDER — LIRAGLUTIDE 6 MG/ML
INJECTION SUBCUTANEOUS
Qty: 27 ML | Refills: 1 | Status: SHIPPED | OUTPATIENT
Start: 2022-07-22

## 2022-07-26 ENCOUNTER — TELEPHONE (OUTPATIENT)
Dept: FAMILY MEDICINE CLINIC | Age: 67
End: 2022-07-26

## 2022-07-26 DIAGNOSIS — E11.9 TYPE 2 DIABETES MELLITUS WITHOUT COMPLICATION, WITHOUT LONG-TERM CURRENT USE OF INSULIN (HCC): ICD-10-CM

## 2022-07-26 DIAGNOSIS — Z00.00 ROUTINE ADULT HEALTH MAINTENANCE: Primary | ICD-10-CM

## 2022-07-26 NOTE — TELEPHONE ENCOUNTER
----- Message from Damaris Casiano sent at 7/22/2022 10:52 AM EDT -----  Regarding: FW: Lab work  Last labs were from 02/03/2022.    ----- Message -----  From: Marla Fortune  Sent: 7/22/2022  10:44 AM EDT  To: [facility] Nurse Pool  Subject: Lab work                                         Can you please send an order to [facility]? Then do I make an appointment to see you?  thanks, Lolly Gordon

## 2022-08-05 LAB
ALBUMIN SERPL-MCNC: 4.5 G/DL (ref 3.8–4.8)
ALBUMIN/CREAT UR: 6 MG/G CREAT (ref 0–29)
ALBUMIN/GLOB SERPL: 2 {RATIO} (ref 1.2–2.2)
ALP SERPL-CCNC: 70 IU/L (ref 44–121)
ALT SERPL-CCNC: 48 IU/L (ref 0–32)
AST SERPL-CCNC: 39 IU/L (ref 0–40)
BASOPHILS # BLD AUTO: 0.1 X10E3/UL (ref 0–0.2)
BASOPHILS NFR BLD AUTO: 1 %
BILIRUB SERPL-MCNC: 0.4 MG/DL (ref 0–1.2)
BUN SERPL-MCNC: 12 MG/DL (ref 8–27)
BUN/CREAT SERPL: 13 (ref 12–28)
CALCIUM SERPL-MCNC: 10.3 MG/DL (ref 8.7–10.3)
CHLORIDE SERPL-SCNC: 100 MMOL/L (ref 96–106)
CHOLEST SERPL-MCNC: 192 MG/DL (ref 100–199)
CO2 SERPL-SCNC: 23 MMOL/L (ref 20–29)
CREAT SERPL-MCNC: 0.89 MG/DL (ref 0.57–1)
CREAT UR-MCNC: 157.3 MG/DL
EGFR: 72 ML/MIN/1.73
EOSINOPHIL # BLD AUTO: 0.2 X10E3/UL (ref 0–0.4)
EOSINOPHIL NFR BLD AUTO: 2 %
ERYTHROCYTE [DISTWIDTH] IN BLOOD BY AUTOMATED COUNT: 12.7 % (ref 11.7–15.4)
EST. AVERAGE GLUCOSE BLD GHB EST-MCNC: 137 MG/DL
GLOBULIN SER CALC-MCNC: 2.3 G/DL (ref 1.5–4.5)
GLUCOSE SERPL-MCNC: 94 MG/DL (ref 65–99)
HBA1C MFR BLD: 6.4 % (ref 4.8–5.6)
HCT VFR BLD AUTO: 38.4 % (ref 34–46.6)
HDLC SERPL-MCNC: 53 MG/DL
HGB BLD-MCNC: 12.4 G/DL (ref 11.1–15.9)
IMM GRANULOCYTES # BLD AUTO: 0 X10E3/UL (ref 0–0.1)
IMM GRANULOCYTES NFR BLD AUTO: 0 %
IMP & REVIEW OF LAB RESULTS: NORMAL
LDLC SERPL CALC-MCNC: 94 MG/DL (ref 0–99)
LYMPHOCYTES # BLD AUTO: 2.7 X10E3/UL (ref 0.7–3.1)
LYMPHOCYTES NFR BLD AUTO: 35 %
MCH RBC QN AUTO: 28 PG (ref 26.6–33)
MCHC RBC AUTO-ENTMCNC: 32.3 G/DL (ref 31.5–35.7)
MCV RBC AUTO: 87 FL (ref 79–97)
MICROALBUMIN UR-MCNC: 9.4 UG/ML
MONOCYTES # BLD AUTO: 0.6 X10E3/UL (ref 0.1–0.9)
MONOCYTES NFR BLD AUTO: 7 %
NEUTROPHILS # BLD AUTO: 4.2 X10E3/UL (ref 1.4–7)
NEUTROPHILS NFR BLD AUTO: 55 %
PLATELET # BLD AUTO: 282 X10E3/UL (ref 150–450)
POTASSIUM SERPL-SCNC: 4.6 MMOL/L (ref 3.5–5.2)
PROT SERPL-MCNC: 6.8 G/DL (ref 6–8.5)
RBC # BLD AUTO: 4.43 X10E6/UL (ref 3.77–5.28)
SODIUM SERPL-SCNC: 139 MMOL/L (ref 134–144)
T4 FREE SERPL-MCNC: 1.32 NG/DL (ref 0.82–1.77)
TRIGL SERPL-MCNC: 267 MG/DL (ref 0–149)
TSH SERPL DL<=0.005 MIU/L-ACNC: 1.82 UIU/ML (ref 0.45–4.5)
VLDLC SERPL CALC-MCNC: 45 MG/DL (ref 5–40)
WBC # BLD AUTO: 7.7 X10E3/UL (ref 3.4–10.8)

## 2022-08-10 ENCOUNTER — TELEPHONE (OUTPATIENT)
Dept: FAMILY MEDICINE CLINIC | Age: 67
End: 2022-08-10

## 2022-08-10 ENCOUNTER — OFFICE VISIT (OUTPATIENT)
Dept: FAMILY MEDICINE CLINIC | Age: 67
End: 2022-08-10
Payer: COMMERCIAL

## 2022-08-10 VITALS
HEART RATE: 67 BPM | TEMPERATURE: 96.9 F | WEIGHT: 191 LBS | OXYGEN SATURATION: 99 % | SYSTOLIC BLOOD PRESSURE: 132 MMHG | BODY MASS INDEX: 33.84 KG/M2 | DIASTOLIC BLOOD PRESSURE: 64 MMHG | RESPIRATION RATE: 20 BRPM | HEIGHT: 63 IN

## 2022-08-10 DIAGNOSIS — E78.00 HYPERCHOLESTEROLEMIA: ICD-10-CM

## 2022-08-10 DIAGNOSIS — E11.9 TYPE 2 DIABETES MELLITUS WITHOUT COMPLICATION, WITHOUT LONG-TERM CURRENT USE OF INSULIN (HCC): ICD-10-CM

## 2022-08-10 DIAGNOSIS — F32.9 REACTIVE DEPRESSION: ICD-10-CM

## 2022-08-10 DIAGNOSIS — R30.0 DYSURIA: ICD-10-CM

## 2022-08-10 DIAGNOSIS — Z00.00 ROUTINE ADULT HEALTH MAINTENANCE: Primary | ICD-10-CM

## 2022-08-10 DIAGNOSIS — E03.9 ACQUIRED HYPOTHYROIDISM: ICD-10-CM

## 2022-08-10 LAB
BILIRUB UR QL STRIP: NEGATIVE
GLUCOSE UR-MCNC: NEGATIVE MG/DL
KETONES P FAST UR STRIP-MCNC: NORMAL MG/DL
PH UR STRIP: 5 [PH] (ref 4.6–8)
PROT UR QL STRIP: NEGATIVE
SP GR UR STRIP: 1.02 (ref 1–1.03)
UA UROBILINOGEN AMB POC: NORMAL (ref 0.2–1)
URINALYSIS CLARITY POC: CLEAR
URINALYSIS COLOR POC: YELLOW
URINE BLOOD POC: NORMAL
URINE LEUKOCYTES POC: NORMAL
URINE NITRITES POC: NEGATIVE

## 2022-08-10 PROCEDURE — 81002 URINALYSIS NONAUTO W/O SCOPE: CPT | Performed by: FAMILY MEDICINE

## 2022-08-10 PROCEDURE — 99397 PER PM REEVAL EST PAT 65+ YR: CPT | Performed by: FAMILY MEDICINE

## 2022-08-10 RX ORDER — PREGABALIN 75 MG/1
75 CAPSULE ORAL 2 TIMES DAILY
COMMUNITY
Start: 2022-07-05

## 2022-08-10 RX ORDER — ERGOCALCIFEROL 1.25 MG/1
CAPSULE ORAL
COMMUNITY
Start: 2022-05-26

## 2022-08-10 RX ORDER — CEPHALEXIN 500 MG/1
500 CAPSULE ORAL 2 TIMES DAILY
Qty: 14 CAPSULE | Refills: 0 | Status: SHIPPED | OUTPATIENT
Start: 2022-08-10 | End: 2022-08-17

## 2022-08-10 NOTE — TELEPHONE ENCOUNTER
Faxed providers office with a copy of patients labs. Also requested a copy of patient office visit with Dr. Melvin Aponte.

## 2022-08-10 NOTE — PROGRESS NOTES
Subjective:   73 y.o. female for Well Woman Check. No LMP recorded. Patient is postmenopausal.    Social History: not sexually active. Pertinent past medical hstory: hypertension, DM. Patient Active Problem List    Diagnosis Date Noted    Reactive depression 08/11/2022    Bunion of left foot 11/11/2021    Hallux abducto valgus, left 11/11/2021    Vitamin D deficiency 07/22/2021    Encounter for long-term current use of medication 11/10/2020    Diabetes ([address] 12/10/2010    Hypercholesterolemia 12/10/2010    Hypothyroid 12/10/2010    HX: breast cancer 12/10/2010    Environmental allergies 12/10/2010    Arthritis 12/10/2010    Sleep apnea 12/10/2010    Fatty liver 12/10/2010     Current Outpatient Medications   Medication Sig Dispense Refill    ergocalciferol (ERGOCALCIFEROL) 1,250 mcg (50,000 unit) capsule       pregabalin (LYRICA) 75 mg capsule Take 75 mg by mouth two (2) times a day. cinnamon bark (CINNAMON PO) Take  by mouth. cephALEXin (KEFLEX) 500 mg capsule Take 1 Capsule by mouth two (2) times a day for 7 days.  Indications: UTI 14 Capsule 0    Victoza 3-Cachorro 0.6 mg/0.1 mL (18 mg/3 mL) pnij INJECT 1.8MG SUBCUTANEOUSLYDAILY 27 mL 1    Synthroid 200 mcg tablet TAKE 1 TABLET DAILY BEFORE BREAKFAST 90 Tablet 3    colesevelam (WELCHOL) 625 mg tablet TAKE 3 TABLETS 2 TIMES     DAILY WITH MEALS 540 Tablet 3    BD Melony 2nd Gen Pen Needle 32 gauge x 5/32\" ndle USE AND DISCARD 1 PEN      NEEDLE DAILY FOR VICTOZA    Pen Needle 3    diclofenac EC (VOLTAREN) 75 mg EC tablet TAKE 1 TABLET TWICE A DAY AS NEEDED 180 Tablet 1    venlafaxine-SR (EFFEXOR-XR) 75 mg capsule TAKE 1 CAPSULE DAILY 90 Capsule 3    ezetimibe (ZETIA) 10 mg tablet TAKE 1 TABLET DAILY 90 Tablet 1    metFORMIN ER (GLUCOPHAGE XR) 500 mg tablet TAKE 2 TABLETS IN THE      MORNING AND 2 TABLETS IN   THE EVENING 360 Tablet 3    carisoprodoL (SOMA) 250 mg tablet TAKE 1 TABLET BY MOUTH 4 TIMES A DAY AS NEEDED FOR MUSCLE SPASM 90 Tablet 1 lisinopriL (PRINIVIL, ZESTRIL) 5 mg tablet Take 1 Tablet by mouth daily. 90 Tablet 3    cetirizine (ZYRTEC) 10 mg tablet Take 10 mg by mouth daily. Omeprazole delayed release (PRILOSEC D/R) 20 mg tablet Take 1 Tab by mouth daily. 90 Tab 1    albuterol (PROVENTIL HFA, VENTOLIN HFA, PROAIR HFA) 90 mcg/actuation inhaler Take 2 Puffs by inhalation every four (4) hours as needed for Wheezing. PRO AIR HFA 3 Inhaler 1    mometasone (NASONEX) 50 mcg/actuation nasal spray 2 Sprays daily. fluticasone (FLONASE) 50 mcg/actuation nasal spray USE 2 SPRAYS TO BOTH NOSTRIL ONCE DAILY 16 g 5    milk thistle 500 mg cap Take 500 mg by mouth daily. 60 Cap 0    glucose blood VI test strips (ONE TOUCH ULTRA TEST) strip Use as directed 1 Package 11    cyanocobalamin (VITAMIN B12) 2,500 mcg sublingual tablet Take 2,500 mg by mouth daily. CALCIUM CARBONATE/VITAMIN D3 (CALCIUM 600 + D,3, PO) Take  by mouth.      magnesium 250 mg Tab Take  by mouth. OMEGA-3 FATTY ACIDS (OMEGA 3 PO) Take 4 Tabs by mouth daily.        Allergies   Allergen Reactions    Crestor [Rosuvastatin] Other (comments)     Increased CK    Simvastatin Myalgia    Tree And Shrub Pollen Sneezing    Bee Pollen Itching     Seasonal allergies    Mite Extract Itching     Dust Mites     Past Medical History:   Diagnosis Date    Arthritis 12/10/2010    Breast cancer ([address]     removed 2/2/09    Closed fracture of right foot with routine healing     Dr Ignacio Low    Diabetes [facility])     Fatty liver 12/10/2010    Hypercholesterolemia     Sleep apnea 12/10/2010    Thyroid disease      Past Surgical History:   Procedure Laterality Date    ENDOSCOPY, COLON, DIAGNOSTIC  1/2009    HX BREAST LUMPECTOMY  2/2/09    reincision    HX CATARACT REMOVAL Right 11/2016    HX CATARACT REMOVAL Left 11/2016    HX GYN  2010    cervical polypectomy    HX HEENT      HX LITHOTRIPSY  1994    HX ORTHOPAEDIC  11/12/2021    foot left    HX TONSIL AND ADENOIDECTOMY  1960     Family History Problem Relation Age of Onset    Cancer Mother     COPD Mother     Cancer Father         mesothelioma    Arthritis-rheumatoid Sister      Social History     Tobacco Use    Smoking status: Never    Smokeless tobacco: Never   Substance Use Topics    Alcohol use: No     Alcohol/week: 0.0 standard drinks        ROS:  Feeling well. No dyspnea or chest pain on exertion. No abdominal pain, change in bowel habits, black or bloody stools. Some urinary symptoms and strong odor. GYN ROS: no breast pain or new or enlarging lumps on self exam. No neurological complaints. Fractured bone in R foot. Followed by Podiatry Dr Tomma Gottron. Taking Lyrica BID. Objective:   Visit Vitals  /64 (BP 1 Location: Left arm, BP Patient Position: Sitting, BP Cuff Size: Adult)   Pulse 67   Temp 96.9 °F (36.1 °C) (Temporal)   Resp 20   Ht 5' 3\" (1.6 m)   Wt 191 lb (86.6 kg)   SpO2 99%   BMI 33.83 kg/m²     The patient appears well, alert, oriented x 3, in no distress. ENT normal.  Neck supple. No adenopathy or thyromegaly. ROBIN. Lungs are clear, good air entry, no wheezes, rhonchi or rales. S1 and S2 normal, no murmurs, regular rate and rhythm. Abdomen soft without tenderness, guarding, mass or organomegaly. Extremities show no edema, normal peripheral pulses. Neurological is normal, no focal findings. Recent Results (from the past 672 hour(s))   CBC WITH AUTOMATED DIFF    Collection Time: 08/04/22  9:00 AM   Result Value Ref Range    WBC 7.7 3.4 - 10.8 x10E3/uL    RBC 4.43 3.77 - 5.28 x10E6/uL    HGB 12.4 11.1 - 15.9 g/dL    HCT 38.4 34.0 - 46.6 %    MCV 87 79 - 97 fL    MCH 28.0 26.6 - 33.0 pg    MCHC 32.3 31.5 - 35.7 g/dL    RDW 12.7 11.7 - 15.4 %    PLATELET 148 964 - 993 x10E3/uL    NEUTROPHILS 55 Not Estab. %    Lymphocytes 35 Not Estab. %    MONOCYTES 7 Not Estab. %    EOSINOPHILS 2 Not Estab. %    BASOPHILS 1 Not Estab. %    ABS. NEUTROPHILS 4.2 1.4 - 7.0 x10E3/uL    Abs Lymphocytes 2.7 0.7 - 3.1 x10E3/uL    ABS.  MONOCYTES 0.6 0.1 - 0.9 x10E3/uL    ABS. EOSINOPHILS 0.2 0.0 - 0.4 x10E3/uL    ABS. BASOPHILS 0.1 0.0 - 0.2 x10E3/uL    IMMATURE GRANULOCYTES 0 Not Estab. %    ABS. IMM. GRANS. 0.0 0.0 - 0.1 H78Z2/VJ   METABOLIC PANEL, COMPREHENSIVE    Collection Time: 08/04/22  9:00 AM   Result Value Ref Range    Glucose 94 65 - 99 mg/dL    BUN 12 8 - 27 mg/dL    Creatinine 0.89 0.57 - 1.00 mg/dL    eGFR 72 >59 mL/min/1.73    BUN/Creatinine ratio 13 12 - 28    Sodium 139 134 - 144 mmol/L    Potassium 4.6 3.5 - 5.2 mmol/L    Chloride 100 96 - 106 mmol/L    CO2 23 20 - 29 mmol/L    Calcium 10.3 8.7 - 10.3 mg/dL    Protein, total 6.8 6.0 - 8.5 g/dL    Albumin 4.5 3.8 - 4.8 g/dL    GLOBULIN, TOTAL 2.3 1.5 - 4.5 g/dL    A-G Ratio 2.0 1.2 - 2.2    Bilirubin, total 0.4 0.0 - 1.2 mg/dL    Alk.  phosphatase 70 44 - 121 IU/L    AST (SGOT) 39 0 - 40 IU/L    ALT (SGPT) 48 (H) 0 - 32 IU/L   LIPID PANEL    Collection Time: 08/04/22  9:00 AM   Result Value Ref Range    Cholesterol, total 192 100 - 199 mg/dL    Triglyceride 267 (H) 0 - 149 mg/dL    HDL Cholesterol 53 >39 mg/dL    VLDL, calculated 45 (H) 5 - 40 mg/dL    LDL, calculated 94 0 - 99 mg/dL   TSH 3RD GENERATION    Collection Time: 08/04/22  9:00 AM   Result Value Ref Range    TSH 1.820 0.450 - 4.500 uIU/mL   HEMOGLOBIN A1C WITH EAG    Collection Time: 08/04/22  9:00 AM   Result Value Ref Range    Hemoglobin A1c 6.4 (H) 4.8 - 5.6 %    Estimated average glucose 137 mg/dL   MICROALBUMIN, UR, RAND W/ MICROALB/CREAT RATIO    Collection Time: 08/04/22  9:00 AM   Result Value Ref Range    Creatinine, urine 157.3 Not Estab. mg/dL    Microalbumin, urine 9.4 Not Estab. ug/mL    Microalb/Creat ratio (ug/mg creat.) 6 0 - 29 mg/g creat   T4, FREE    Collection Time: 08/04/22  9:00 AM   Result Value Ref Range    T4, Free 1.32 0.82 - 1.77 ng/dL   CVD REPORT    Collection Time: 08/04/22  9:00 AM   Result Value Ref Range    INTERPRETATION Note    AMB POC URINALYSIS DIP STICK MANUAL W/O MICRO    Collection Time: 08/10/22  3:35 PM   Result Value Ref Range    Color (UA POC) Yellow     Clarity (UA POC) Clear     Glucose (UA POC) Negative Negative    Bilirubin (UA POC) Negative Negative    Ketones (UA POC) Trace Negative    Specific gravity (UA POC) 1.025 1.001 - 1.035    Blood (UA POC) Trace Negative    pH (UA POC) 5.0 4.6 - 8.0    Protein (UA POC) Negative Negative    Urobilinogen (UA POC) 0.2 mg/dL 0.2 - 1    Nitrites (UA POC) Negative Negative    Leukocyte esterase (UA POC) 1+ Negative           Assessment/Plan:   well woman  return annually or prn    ICD-10-CM ICD-9-CM    1. Routine adult health maintenance  Z00.00 V70.0       2. Dysuria  R30.0 788.1 AMB POC URINALYSIS DIP STICK MANUAL W/O MICRO      cephALEXin (KEFLEX) 500 mg capsule      3. Reactive depression  F32.9 300.4       4. Type 2 diabetes mellitus without complication, without long-term current use of insulin (HCC)  E11.9 250.00       5. Acquired hypothyroidism  E03.9 244.9       6. Hypercholesterolemia  E78.00 272.0       . Pt is not interested in seeking counseling for depression over death of loved ones. She feels she has strong family and friends support network. Remains on Effexor. She cont to work and stays busy. Her sister is currently living with her. Good DM control. Good thyroid level. Stable lipids. R foot fracture. FU with Dr Bettina Albrecht. Will request last note.

## 2022-08-10 NOTE — PROGRESS NOTES
Identified pt with two pt identifiers(name and ). Chief Complaint   Patient presents with    Physical    Bladder Infection     Pressure and odor        Health Maintenance Due   Topic    Medicare Yearly Exam     COVID-19 Vaccine (4 - Booster for Pfizer series)    Foot Exam Q1        Wt Readings from Last 3 Encounters:   08/10/22 191 lb (86.6 kg)   22 187 lb 12.8 oz (85.2 kg)   21 190 lb 7.6 oz (86.4 kg)     Temp Readings from Last 3 Encounters:   08/10/22 96.9 °F (36.1 °C) (Temporal)   22 98 °F (36.7 °C) (Oral)   21 97.3 °F (36.3 °C)     BP Readings from Last 3 Encounters:   08/10/22 132/64   22 128/84   21 (!) 123/57     Pulse Readings from Last 3 Encounters:   08/10/22 67   22 63   21 61         Learning Assessment:  :     Learning Assessment 2/3/2022 2018 1/15/2014   PRIMARY LEARNER Patient Patient Patient   HIGHEST LEVEL OF EDUCATION - PRIMARY LEARNER  - > 4 YEARS OF COLLEGE > 4 YEARS OF COLLEGE   BARRIERS PRIMARY LEARNER - NONE NONE   CO-LEARNER CAREGIVER No No No   PRIMARY LANGUAGE ENGLISH ENGLISH ENGLISH   LEARNER PREFERENCE PRIMARY DEMONSTRATION DEMONSTRATION DEMONSTRATION   ANSWERED BY patient self self   RELATIONSHIP SELF SELF SELF       Depression Screening:  :     3 most recent PHQ Screens 8/10/2022   Little interest or pleasure in doing things Not at all   Feeling down, depressed, irritable, or hopeless Not at all   Total Score PHQ 2 0       Fall Risk Assessment:  :     Fall Risk Assessment, last 12 mths 2/3/2022   Able to walk? Yes   Fall in past 12 months? 0       Abuse Screening:  :     Abuse Screening Questionnaire 8/10/2022 2/3/2022 10/20/2021 5/15/2020 2018 2017 2016   Do you ever feel afraid of your partner? N N N N N - N   Are you in a relationship with someone who physically or mentally threatens you? N N N N N N N   Is it safe for you to go home?  Y Y Y Y Y N Y       Coordination of Care Questionnaire:  :     1) Have you been to an emergency room, urgent care clinic since your last visit? no   Hospitalized since your last visit? no             2) Have you seen or consulted any other health care providers outside of [address] [place] [address] since your last visit? no  (Include any pap smears or colon screenings in this section.)    3) Do you have an Advance Directive on file? yes  Are you interested in receiving information about Advance Directives? no    Patient is accompanied by N/A I have received verbal consent from Yang Franks to discuss any/all medical information while they are present in the room. 4.  For patients aged 39-70: Has the patient had a colonoscopy / FIT/ Cologuard? Yes - no Care Gap present      If the patient is female:    5. For patients aged 41-77: Has the patient had a mammogram within the past 2 years? Yes - no Care Gap present      6. For patients aged 21-65: Has the patient had a pap smear?  Yes - no Care Gap present

## 2022-08-11 PROBLEM — F32.9 REACTIVE DEPRESSION: Status: ACTIVE | Noted: 2022-08-11

## 2022-08-24 DIAGNOSIS — E78.00 HYPERCHOLESTEROLEMIA: ICD-10-CM

## 2022-08-24 RX ORDER — EZETIMIBE 10 MG/1
TABLET ORAL
Qty: 90 TABLET | Refills: 3 | Status: SHIPPED | OUTPATIENT
Start: 2022-08-24

## 2022-09-21 DIAGNOSIS — I10 PRIMARY HYPERTENSION: ICD-10-CM

## 2022-09-21 RX ORDER — LISINOPRIL 5 MG/1
5 TABLET ORAL DAILY
Qty: 90 TABLET | Refills: 3 | Status: SHIPPED | OUTPATIENT
Start: 2022-09-21

## 2022-12-31 DIAGNOSIS — E11.9 TYPE 2 DIABETES MELLITUS WITHOUT COMPLICATION, WITHOUT LONG-TERM CURRENT USE OF INSULIN (HCC): ICD-10-CM

## 2022-12-31 RX ORDER — LIRAGLUTIDE 6 MG/ML
INJECTION SUBCUTANEOUS
Qty: 27 ML | Refills: 1 | Status: SHIPPED | OUTPATIENT
Start: 2022-12-31

## 2023-01-03 DIAGNOSIS — E11.9 TYPE 2 DIABETES MELLITUS WITHOUT COMPLICATION, WITHOUT LONG-TERM CURRENT USE OF INSULIN (HCC): ICD-10-CM

## 2023-01-03 RX ORDER — METFORMIN HYDROCHLORIDE 500 MG/1
1000 TABLET, EXTENDED RELEASE ORAL 2 TIMES DAILY
Qty: 360 TABLET | Refills: 3 | Status: SHIPPED | OUTPATIENT
Start: 2023-01-03

## 2023-01-13 ENCOUNTER — TELEPHONE (OUTPATIENT)
Dept: FAMILY MEDICINE CLINIC | Age: 68
End: 2023-01-13

## 2023-01-13 DIAGNOSIS — Z85.3 HX: BREAST CANCER: Primary | ICD-10-CM

## 2023-01-13 NOTE — TELEPHONE ENCOUNTER
----- Message from [employer] sent at 1/13/2023  3:23 PM EST -----  Regarding: FW: Prescription for bras and orstesis    ----- Message -----  From: Shoaib Santana  Sent: 1/13/2023   3:20 PM EST  To: [facility] Nurse Pool  Subject: Prescription for bras and orstesis               Can you please send a prescription to [facility] for my bras? My appt us in March her fax is [phone].  Thank you

## 2023-02-03 DIAGNOSIS — J98.01 BRONCHOSPASM: ICD-10-CM

## 2023-02-03 RX ORDER — ALBUTEROL SULFATE 90 UG/1
2 AEROSOL, METERED RESPIRATORY (INHALATION)
Qty: 3 EACH | Refills: 1 | Status: SHIPPED | OUTPATIENT
Start: 2023-02-03

## 2023-02-16 ENCOUNTER — TELEPHONE (OUTPATIENT)
Dept: FAMILY MEDICINE CLINIC | Age: 68
End: 2023-02-16

## 2023-02-16 DIAGNOSIS — E55.9 VITAMIN D DEFICIENCY: ICD-10-CM

## 2023-02-16 DIAGNOSIS — E11.9 TYPE 2 DIABETES MELLITUS WITHOUT COMPLICATION, WITHOUT LONG-TERM CURRENT USE OF INSULIN (HCC): Primary | ICD-10-CM

## 2023-02-16 DIAGNOSIS — Z79.899 ENCOUNTER FOR LONG-TERM CURRENT USE OF MEDICATION: ICD-10-CM

## 2023-02-16 DIAGNOSIS — E03.9 ACQUIRED HYPOTHYROIDISM: ICD-10-CM

## 2023-02-16 DIAGNOSIS — E78.00 HYPERCHOLESTEROLEMIA: ICD-10-CM

## 2023-02-16 DIAGNOSIS — I10 PRIMARY HYPERTENSION: ICD-10-CM

## 2023-02-17 NOTE — TELEPHONE ENCOUNTER
Last CPE submitted was 8/10/22. I placed lab orders for med follow up. Call pt to ask which [facility] she wants orders faxed to. I will see her 3/6/23. I don't know if Dr Yumi Blevins (Podiatry) needs any particular labs for preop. Please check if we received any forms for preop.

## 2023-02-17 NOTE — TELEPHONE ENCOUNTER
Faxed over labs to [address]. Received preop paperwork for her surgery and it is in folder for her visit on 0303/2023.

## 2023-02-17 NOTE — TELEPHONE ENCOUNTER
Regarding: Visit  ----- Message from Belle Perdomo sent at 2/16/2023  3:06 PM EST -----  Patient would like lab orders put in for office visit on 03/06/2023.     ----- Message sent from Reynold Fuller to Zullya Libman at 2/16/2023  1:28 PM -----   You can call [phone] to schedule your appointment. You are due for follow up as of 02/10/2023. Once you schedule appt I can see if she can put in orders for your labs and I can fax them where you would like for them to go.      Miguel Angel Edwards      ----- Message -----       Marcia Kramer       Sent:2/16/2023  1:07 PM RAF Peaz MD    Subject:Visit    I also need to make an appointment after the 24th for a physical.

## 2023-03-06 ENCOUNTER — OFFICE VISIT (OUTPATIENT)
Dept: FAMILY MEDICINE CLINIC | Age: 68
End: 2023-03-06
Payer: COMMERCIAL

## 2023-03-06 VITALS
TEMPERATURE: 98.4 F | HEART RATE: 75 BPM | WEIGHT: 193 LBS | DIASTOLIC BLOOD PRESSURE: 66 MMHG | HEIGHT: 63 IN | RESPIRATION RATE: 20 BRPM | OXYGEN SATURATION: 97 % | BODY MASS INDEX: 34.2 KG/M2 | SYSTOLIC BLOOD PRESSURE: 136 MMHG

## 2023-03-06 DIAGNOSIS — J18.9 PNEUMONIA OF RIGHT LUNG DUE TO INFECTIOUS ORGANISM, UNSPECIFIED PART OF LUNG: ICD-10-CM

## 2023-03-06 DIAGNOSIS — M20.42 HAMMER TOE OF LEFT FOOT: ICD-10-CM

## 2023-03-06 DIAGNOSIS — Z01.818 PRE-OP EXAM: Primary | ICD-10-CM

## 2023-03-06 DIAGNOSIS — E11.9 TYPE 2 DIABETES MELLITUS WITHOUT COMPLICATION, WITHOUT LONG-TERM CURRENT USE OF INSULIN (HCC): ICD-10-CM

## 2023-03-06 PROCEDURE — 3051F HG A1C>EQUAL 7.0%<8.0%: CPT | Performed by: FAMILY MEDICINE

## 2023-03-06 PROCEDURE — 1123F ACP DISCUSS/DSCN MKR DOCD: CPT | Performed by: FAMILY MEDICINE

## 2023-03-06 PROCEDURE — 99214 OFFICE O/P EST MOD 30 MIN: CPT | Performed by: FAMILY MEDICINE

## 2023-03-06 PROCEDURE — 93000 ELECTROCARDIOGRAM COMPLETE: CPT | Performed by: FAMILY MEDICINE

## 2023-03-06 RX ORDER — SEMAGLUTIDE 1.34 MG/ML
1 INJECTION, SOLUTION SUBCUTANEOUS
Qty: 9 EACH | Refills: 3 | Status: SHIPPED | OUTPATIENT
Start: 2023-03-06

## 2023-03-06 RX ORDER — LEVOFLOXACIN 500 MG/1
500 TABLET, FILM COATED ORAL DAILY
Qty: 10 TABLET | Refills: 0 | Status: SHIPPED | OUTPATIENT
Start: 2023-03-06 | End: 2023-03-16

## 2023-03-06 NOTE — PROGRESS NOTES
Identified pt with two pt identifiers(name and ). Chief Complaint   Patient presents with    Pre-op Exam     Left foot surgery on 2023 [facility] Maintenance Due   Topic    COVID-19 Vaccine (5 - Booster for Mercedes Peter series)    Flu Vaccine (1)       Wt Readings from Last 3 Encounters:   23 193 lb (87.5 kg)   08/10/22 191 lb (86.6 kg)   22 187 lb 12.8 oz (85.2 kg)     Temp Readings from Last 3 Encounters:   23 98.4 °F (36.9 °C) (Temporal)   08/10/22 96.9 °F (36.1 °C) (Temporal)   22 98 °F (36.7 °C) (Oral)     BP Readings from Last 3 Encounters:   23 136/66   08/10/22 132/64   22 128/84     Pulse Readings from Last 3 Encounters:   23 75   08/10/22 67   22 63         Learning Assessment:  :     Learning Assessment 2/3/2022 2018 1/15/2014   PRIMARY LEARNER Patient Patient Patient   HIGHEST LEVEL OF EDUCATION - PRIMARY LEARNER  - > 4 YEARS OF COLLEGE > 4 YEARS OF COLLEGE   BARRIERS PRIMARY LEARNER - NONE NONE   CO-LEARNER CAREGIVER No No No   PRIMARY LANGUAGE ENGLISH ENGLISH ENGLISH   LEARNER PREFERENCE PRIMARY DEMONSTRATION DEMONSTRATION DEMONSTRATION   ANSWERED BY patient self self   RELATIONSHIP SELF SELF SELF       Depression Screening:  :     3 most recent PHQ Screens 3/6/2023   Little interest or pleasure in doing things Not at all   Feeling down, depressed, irritable, or hopeless Not at all   Total Score PHQ 2 0       Fall Risk Assessment:  :     Fall Risk Assessment, last 12 mths 3/6/2023   Able to walk? Yes   Fall in past 12 months? 1   Do you feel unsteady? 0   Are you worried about falling 0   Is TUG test greater than 12 seconds? 0   Is the gait abnormal? 0   Number of falls in past 12 months 1   Fall with injury? 1       Abuse Screening:  :     Abuse Screening Questionnaire 3/6/2023 8/10/2022 2/3/2022 10/20/2021 5/15/2020 2018 2017   Do you ever feel afraid of your partner?  N N N N N N -   Are you in a relationship with someone who physically or mentally threatens you? N N N N N N N   Is it safe for you to go home? Y Y Y Y Y Y N       Coordination of Care Questionnaire:  :     1) Have you been to an emergency room, urgent care clinic since your last visit? no [facility] for pneumonia   Hospitalized since your last visit? no             2) Have you seen or consulted any other health care providers outside of [address] [place] [address] since your last visit? no  (Include any pap smears or colon screenings in this section.)    3) Do you have an Advance Directive on file? yes  Are you interested in receiving information about Advance Directives? no    Patient is accompanied by self I have received verbal consent from Josiah Vogel to discuss any/all medical information while they are present in the room. 4.  For patients aged 39-70: Has the patient had a colonoscopy / FIT/ Cologuard? Yes - no Care Gap present      If the patient is female:    5. For patients aged 41-77: Has the patient had a mammogram within the past 2 years? Yes - no Care Gap present      6. For patients aged 21-65: Has the patient had a pap smear?  NA - based on age or sex

## 2023-03-08 NOTE — PROGRESS NOTES
Subjective: Taniya Campbell is a 78 y.o. female who presents for follow up of diabetes, hyperlipidemia, and obesity. Diet and Lifestyle: generally follows a low fat low cholesterol diet, generally follows a low sodium diet, nonsmoker  Home BP Monitoring: is not measured at home    Cardiovascular ROS: taking medications as instructed, no medication side effects noted, no TIA's, no chest pain on exertion, no dyspnea on exertion, no swelling of ankles. New concerns: she is scheduled for left foot surgery 3/24/23 by Dr Stephen Guidry to correct hammertoes. Reviewed lab results. DX with pneumonia 2/26 at [facility].  Took 10 days DOXY. 1 week course prednisone. Using rescue inhaler. Has appt for repeat CXR. Patient Active Problem List    Diagnosis Date Noted    Reactive depression 08/11/2022    Bunion of left foot 11/11/2021    Hallux abducto valgus, left 11/11/2021    Vitamin D deficiency 07/22/2021    Encounter for long-term current use of medication 11/10/2020    Diabetes ([address] 12/10/2010    Hypercholesterolemia 12/10/2010    Hypothyroid 12/10/2010    HX: breast cancer 12/10/2010    Environmental allergies 12/10/2010    Arthritis 12/10/2010    Sleep apnea 12/10/2010    Fatty liver 12/10/2010     Current Outpatient Medications   Medication Sig Dispense Refill    levoFLOXacin (LEVAQUIN) 500 mg tablet Take 1 Tablet by mouth daily for 10 days. 10 Tablet 0    albuterol (PROVENTIL HFA, VENTOLIN HFA, PROAIR HFA) 90 mcg/actuation inhaler Take 2 Puffs by inhalation every four (4) hours as needed for Wheezing. PRO AIR HFA 3 Each 1    metFORMIN ER (GLUCOPHAGE XR) 500 mg tablet Take 2 Tablets by mouth two (2) times a day. 360 Tablet 3    lisinopriL (PRINIVIL, ZESTRIL) 5 mg tablet Take 1 Tablet by mouth daily.  90 Tablet 3    ezetimibe (ZETIA) 10 mg tablet TAKE 1 TABLET DAILY 90 Tablet 3    ergocalciferol (ERGOCALCIFEROL) 1,250 mcg (50,000 unit) capsule       pregabalin (LYRICA) 75 mg capsule Take 75 mg by mouth three (3) times daily. cinnamon bark (CINNAMON PO) Take  by mouth. Synthroid 200 mcg tablet TAKE 1 TABLET DAILY BEFORE BREAKFAST 90 Tablet 3    colesevelam (WELCHOL) 625 mg tablet TAKE 3 TABLETS 2 TIMES     DAILY WITH MEALS 540 Tablet 3    BD Melony 2nd Gen Pen Needle 32 gauge x 5/32\" ndle USE AND DISCARD 1 PEN      NEEDLE DAILY FOR VICTOZA    Pen Needle 3    diclofenac EC (VOLTAREN) 75 mg EC tablet TAKE 1 TABLET TWICE A DAY AS NEEDED 180 Tablet 1    venlafaxine-SR (EFFEXOR-XR) 75 mg capsule TAKE 1 CAPSULE DAILY 90 Capsule 3    cetirizine (ZYRTEC) 10 mg tablet Take 10 mg by mouth daily. Omeprazole delayed release (PRILOSEC D/R) 20 mg tablet Take 1 Tab by mouth daily. 90 Tab 1    mometasone (NASONEX) 50 mcg/actuation nasal spray 2 Sprays daily. fluticasone (FLONASE) 50 mcg/actuation nasal spray USE 2 SPRAYS TO BOTH NOSTRIL ONCE DAILY 16 g 5    milk thistle 500 mg cap Take 500 mg by mouth daily. 60 Cap 0    glucose blood VI test strips (ONE TOUCH ULTRA TEST) strip Use as directed 1 Package 11    cyanocobalamin (VITAMIN B12) 2,500 mcg sublingual tablet Take 2,500 mg by mouth daily. CALCIUM CARBONATE/VITAMIN D3 (CALCIUM 600 + D,3, PO) Take  by mouth.      magnesium 250 mg Tab Take  by mouth. OMEGA-3 FATTY ACIDS (OMEGA 3 PO) Take 4 Tabs by mouth daily. semaglutide (Ozempic) 1 mg/dose (4 mg/3 mL) pnij 1 mg by SubCUTAneous route every seven (7) days.  Indications: type 2 diabetes mellitus (Patient not taking: Reported on 3/6/2023) 9 Each 3     Allergies   Allergen Reactions    Crestor [Rosuvastatin] Other (comments)     Increased CK    Simvastatin Myalgia    Tree And Shrub Pollen Sneezing    Bee Pollen Itching     Seasonal allergies    Mite Extract Itching     Dust Mites     Past Medical History:   Diagnosis Date    Arthritis 12/10/2010    Breast cancer ([address]     removed 2/2/09    Closed fracture of right foot with routine healing     Dr Rob Rodriges    Diabetes [facility])     Fatty liver 12/10/2010    Hypercholesterolemia     Sleep apnea 12/10/2010    Thyroid disease      Past Surgical History:   Procedure Laterality Date    ENDOSCOPY, COLON, DIAGNOSTIC  1/2009    HX BREAST LUMPECTOMY  2/2/09    reincision    HX CATARACT REMOVAL Right 11/2016    HX CATARACT REMOVAL Left 11/2016    HX GYN  2010    cervical polypectomy    HX HEENT      HX LITHOTRIPSY  1994    HX ORTHOPAEDIC  11/12/2021    foot left    HX TONSIL AND ADENOIDECTOMY  1960     Family History   Problem Relation Age of Onset    Cancer Mother     COPD Mother     Cancer Father         mesothelioma    Arthritis-rheumatoid Sister      Social History     Tobacco Use    Smoking status: Never    Smokeless tobacco: Never   Substance Use Topics    Alcohol use: No     Alcohol/week: 0.0 standard drinks        Lab Results   Component Value Date/Time    WBC 10.1 03/03/2023 09:12 AM    HGB 12.0 03/03/2023 09:12 AM    HCT 37.8 03/03/2023 09:12 AM    PLATELET 372 94/95/9847 09:12 AM    MCV 83 03/03/2023 09:12 AM     Lab Results   Component Value Date/Time    Hemoglobin A1c 7.2 (H) 03/03/2023 09:12 AM    Hemoglobin A1c 6.4 (H) 08/04/2022 09:00 AM    Hemoglobin A1c 6.9 (H) 02/03/2022 09:17 AM    Glucose 92 03/03/2023 09:12 AM    Glucose (POC) 111 11/12/2021 11:09 AM    Microalbumin/Creat ratio (mg/g creat) 8 10/20/2021 01:59 PM    Microalb/Creat ratio (ug/mg creat.) 6 08/04/2022 09:00 AM    Microalbumin,urine random 0.75 10/20/2021 01:59 PM    LDL, calculated 63 03/03/2023 09:12 AM    LDL, calculated 90.8 02/03/2022 09:17 AM    Creatinine 0.66 03/03/2023 09:12 AM      Lab Results   Component Value Date/Time    Cholesterol, total 138 03/03/2023 09:12 AM    HDL Cholesterol 46 03/03/2023 09:12 AM    LDL, calculated 63 03/03/2023 09:12 AM    LDL, calculated 90.8 02/03/2022 09:17 AM    Triglyceride 171 (H) 03/03/2023 09:12 AM    CHOL/HDL Ratio 3.9 02/03/2022 09:17 AM     Lab Results   Component Value Date/Time    ALT (SGPT) 53 (H) 03/03/2023 09:12 AM    Alk. phosphatase 66 03/03/2023 09:12 AM    Bilirubin, total 0.3 03/03/2023 09:12 AM    Albumin 4.2 03/03/2023 09:12 AM    Protein, total 6.5 03/03/2023 09:12 AM    PLATELET 235 25/84/4964 09:12 AM       Lab Results   Component Value Date/Time    GFR est non-AA >60 02/03/2022 09:17 AM    GFR est AA >60 02/03/2022 09:17 AM    Creatinine 0.66 03/03/2023 09:12 AM    BUN 14 03/03/2023 09:12 AM    Sodium 141 03/03/2023 09:12 AM    Potassium 4.4 03/03/2023 09:12 AM    Chloride 102 03/03/2023 09:12 AM    CO2 24 03/03/2023 09:12 AM    Magnesium 1.8 08/13/2015 10:54 AM    PTH, Intact 36 08/02/2021 09:19 AM     Lab Results   Component Value Date/Time    TSH 5.230 (H) 03/03/2023 09:12 AM    T4, Free 1.23 03/03/2023 09:12 AM         Review of Systems, additional:  Respiratory: positive for cough or wheezing    Objective:     Visit Vitals  /66 (BP 1 Location: Right arm, BP Patient Position: Sitting, BP Cuff Size: Adult)   Pulse 75   Temp 98.4 °F (36.9 °C) (Temporal)   Resp 20   Ht 5' 3\" (1.6 m)   Wt 193 lb (87.5 kg)   SpO2 97%   BMI 34.19 kg/m²     Appearance: alert, well appearing, and in no distress and overweight. General exam: CVS exam BP noted to be well controlled today in office, CVS exam  - normal rate, regular rhythm, normal S1, S2, no murmurs, rubs, clicks or gallops. Chest: + wheezing. Lab review: labs reviewed. Assessment/Plan:     diabetes stable, hyperlipidemia stable, pneumonia. Venancio. .     ICD-10-CM ICD-9-CM    1. Pre-op exam  Z01.818 V72.84 AMB POC EKG ROUTINE W/ 12 LEADS, INTER & REP      2. Type 2 diabetes mellitus without complication, without long-term current use of insulin (HCC)  E11.9 250.00 semaglutide (Ozempic) 1 mg/dose (4 mg/3 mL) pnij      3. Hammer toe of left foot  M20.42 735.4       4.  Pneumonia of right lung due to infectious organism, unspecified part of lung  J18.9 483.8 levoFLOXacin (LEVAQUIN) 500 mg tablet        Plan to switch Victoza to Ozempic 1 mg per week for DM and wt reduction. Order Levaquin. Has FU CXR appt  FU in 1 week.

## 2023-03-15 DIAGNOSIS — E11.9 TYPE 2 DIABETES MELLITUS WITHOUT COMPLICATION, WITHOUT LONG-TERM CURRENT USE OF INSULIN (HCC): ICD-10-CM

## 2023-03-15 RX ORDER — PEN NEEDLE, DIABETIC 32GX 5/32"
NEEDLE, DISPOSABLE MISCELLANEOUS
Qty: 90 PEN NEEDLE | Refills: 3 | Status: SHIPPED | OUTPATIENT
Start: 2023-03-15 | End: 2023-03-17

## 2023-03-15 RX ORDER — VENLAFAXINE HYDROCHLORIDE 75 MG/1
CAPSULE, EXTENDED RELEASE ORAL
Qty: 90 CAPSULE | Refills: 3 | Status: SHIPPED | OUTPATIENT
Start: 2023-03-15

## 2023-03-17 RX ORDER — PREGABALIN 75 MG/1
150 CAPSULE ORAL
COMMUNITY

## 2023-03-17 NOTE — PERIOP NOTES
PAT PREOP PHONE INTERVIEW COMPLETED WITH:     PATIENT ADVISED NOT TO EAT/DRINK ANYTHING PAST MIDNIGHT EVENING PRIOR TO SURGERY,  NOTHING TO EAT/DRINK MORNING OF SURGERY UNLESS SIP OF WATER TO SWALLOW MEDICATION;  LEAVE ALL VALUABLES AT HOME; DO BRING PICTURE ID, INSURANCE CARD AND ANY COPAY; WEAR COMFORTABLE CLOTHING;  NO PERFUMES, POWDERS, LOTIONS; NO ALCOHOL 24 HOURS BEFORE OR AFTER SURGERY;  WILL NEED TO BE DRIVEN HOME BY FAMILY OR FRIEND;  AVOID TAKING NSAIDS, ASPIRIN, FISH OIL, VITAMIN E OR GLUCOSAMINE/CHONDROITIN DURING THIS TIME PRIOR TO SURGERY;  MAY TAKE TYLENOL. INSTRUCTED TO REPORT  [address] BY SURGEON'S OFFICE. INSTRUCTION PROVIDED FOR CHG SOAP.           INSTRUCTED TO BRING COVID CARD ON DAY OF SURGERY

## 2023-03-20 ENCOUNTER — OFFICE VISIT (OUTPATIENT)
Dept: FAMILY MEDICINE CLINIC | Age: 68
End: 2023-03-20
Payer: COMMERCIAL

## 2023-03-20 VITALS
DIASTOLIC BLOOD PRESSURE: 78 MMHG | HEIGHT: 63 IN | RESPIRATION RATE: 18 BRPM | OXYGEN SATURATION: 95 % | WEIGHT: 192 LBS | HEART RATE: 82 BPM | SYSTOLIC BLOOD PRESSURE: 124 MMHG | TEMPERATURE: 97.6 F | BODY MASS INDEX: 34.02 KG/M2

## 2023-03-20 DIAGNOSIS — J18.9 PNEUMONIA OF RIGHT LUNG DUE TO INFECTIOUS ORGANISM, UNSPECIFIED PART OF LUNG: Primary | ICD-10-CM

## 2023-03-20 PROCEDURE — 99213 OFFICE O/P EST LOW 20 MIN: CPT | Performed by: FAMILY MEDICINE

## 2023-03-20 PROCEDURE — 1123F ACP DISCUSS/DSCN MKR DOCD: CPT | Performed by: FAMILY MEDICINE

## 2023-03-20 RX ORDER — PREDNISONE 5 MG/1
TABLET ORAL SEE ADMIN INSTRUCTIONS
COMMUNITY
End: 2023-03-20 | Stop reason: ALTCHOICE

## 2023-03-20 NOTE — PROGRESS NOTES
Identified pt with two pt identifiers(name and ). Chief Complaint   Patient presents with    Follow-up     Recheck lungs        Health Maintenance Due   Topic    COVID-19 Vaccine (5 - Booster for Pfizer series)       Wt Readings from Last 3 Encounters:   23 192 lb (87.1 kg)   23 193 lb (87.5 kg)   08/10/22 191 lb (86.6 kg)     Temp Readings from Last 3 Encounters:   23 97.6 °F (36.4 °C) (Temporal)   23 98.4 °F (36.9 °C) (Temporal)   08/10/22 96.9 °F (36.1 °C) (Temporal)     BP Readings from Last 3 Encounters:   23 124/78   23 136/66   08/10/22 132/64     Pulse Readings from Last 3 Encounters:   23 82   23 75   08/10/22 67         Learning Assessment:  :     Learning Assessment 2/3/2022 2018 1/15/2014   PRIMARY LEARNER Patient Patient Patient   HIGHEST LEVEL OF EDUCATION - PRIMARY LEARNER  - > 4 YEARS OF COLLEGE > 4 YEARS OF COLLEGE   BARRIERS PRIMARY LEARNER - NONE NONE   CO-LEARNER CAREGIVER No No No   PRIMARY LANGUAGE ENGLISH ENGLISH ENGLISH   LEARNER PREFERENCE PRIMARY DEMONSTRATION DEMONSTRATION DEMONSTRATION   ANSWERED BY patient self self   RELATIONSHIP SELF SELF SELF       Depression Screening:  :     3 most recent PHQ Screens 3/20/2023   Little interest or pleasure in doing things Not at all   Feeling down, depressed, irritable, or hopeless Not at all   Total Score PHQ 2 0       Fall Risk Assessment:  :     Fall Risk Assessment, last 12 mths 3/6/2023   Able to walk? Yes   Fall in past 12 months? 1   Do you feel unsteady? 0   Are you worried about falling 0   Is TUG test greater than 12 seconds? 0   Is the gait abnormal? 0   Number of falls in past 12 months 1   Fall with injury? 1       Abuse Screening:  :     Abuse Screening Questionnaire 3/20/2023 3/6/2023 8/10/2022 2/3/2022 10/20/2021 5/15/2020 2018   Do you ever feel afraid of your partner? N N N N N N N   Are you in a relationship with someone who physically or mentally threatens you?  N N N N N N N   Is it safe for you to go home? Y Y Y Y Y Y Y       Coordination of Care Questionnaire:  :     1) Have you been to an emergency room, urgent care clinic since your last visit? yes [facility] yesterday for Bronchitis   Hospitalized since your last visit? no             2) Have you seen or consulted any other health care providers outside of [address] [place] [address] since your last visit? no  (Include any pap smears or colon screenings in this section.)    3) Do you have an Advance Directive on file? yes  Are you interested in receiving information about Advance Directives? no    Patient is accompanied by self I have received verbal consent from [facility] to discuss any/all medical information while they are present in the room. 4.  For patients aged 39-70: Has the patient had a colonoscopy / FIT/ Cologuard? Yes - no Care Gap present      If the patient is female:    5. For patients aged 41-77: Has the patient had a mammogram within the past 2 years? Yes - no Care Gap present      6. For patients aged 21-65: Has the patient had a pap smear?  NA - based on age or sex

## 2023-03-21 NOTE — PROGRESS NOTES
HISTORY OF PRESENT ILLNESS  Richard Alvarado is a 78 y.o. female. Follow-up  FU Pneumonia. She went to [facility] and got repeat CXR. Told it was better but they gave her another round of Z [place]. Pt still on this. Less coughing. ROS  Visit Vitals  /78 (BP 1 Location: Left arm, BP Patient Position: Sitting, BP Cuff Size: Adult)   Pulse 82   Temp 97.6 °F (36.4 °C) (Temporal)   Resp 18   Ht 5' 3\" (1.6 m)   Wt 192 lb (87.1 kg)   SpO2 95%   BMI 34.01 kg/m²       Physical Exam  Vitals reviewed. Constitutional:       General: She is not in acute distress. Cardiovascular:      Rate and Rhythm: Normal rate and regular rhythm. Heart sounds: Normal heart sounds. Pulmonary:      Effort: Pulmonary effort is normal.      Breath sounds: Normal breath sounds. Neurological:      Mental Status: She is alert. ASSESSMENT and PLAN    ICD-10-CM ICD-9-CM    1. Pneumonia of right lung due to infectious organism, unspecified part of lung  J18.9 483.8       Resolved. She is scheduled for foot surgery.

## 2023-03-23 ENCOUNTER — ANESTHESIA EVENT (OUTPATIENT)
Dept: SURGERY | Age: 68
End: 2023-03-23
Payer: COMMERCIAL

## 2023-03-23 PROBLEM — M77.42 METATARSALGIA, LEFT FOOT: Status: ACTIVE | Noted: 2023-03-23

## 2023-03-23 PROBLEM — M72.2 PLANTAR FASCIITIS, RIGHT: Status: ACTIVE | Noted: 2023-03-23

## 2023-03-23 PROBLEM — M20.42 HAMMERTOE OF LEFT FOOT: Status: ACTIVE | Noted: 2023-03-23

## 2023-03-23 PROBLEM — T84.84XA PAINFUL ORTHOPAEDIC HARDWARE (HCC): Status: ACTIVE | Noted: 2023-03-23

## 2023-03-23 NOTE — H&P
Patient is acceptable risk for left foot surgery of the second metatarsal osteotomy with fixation and hammertoe correction of the second, third, fourth and fifth digits and hardware removal of the  first metatarsal .  History and physical is updated and there are no changes.   All information was faxed to the main OR

## 2023-03-24 ENCOUNTER — ANESTHESIA (OUTPATIENT)
Dept: SURGERY | Age: 68
End: 2023-03-24
Payer: COMMERCIAL

## 2023-03-24 ENCOUNTER — HOSPITAL ENCOUNTER (OUTPATIENT)
Age: 68
Setting detail: OUTPATIENT SURGERY
Discharge: HOME | End: 2023-03-24
Attending: PODIATRIST | Admitting: PODIATRIST
Payer: COMMERCIAL

## 2023-03-24 VITALS
RESPIRATION RATE: 14 BRPM | TEMPERATURE: 98.4 F | DIASTOLIC BLOOD PRESSURE: 60 MMHG | HEIGHT: 63 IN | SYSTOLIC BLOOD PRESSURE: 108 MMHG | BODY MASS INDEX: 34.55 KG/M2 | WEIGHT: 195 LBS | HEART RATE: 65 BPM | OXYGEN SATURATION: 97 %

## 2023-03-24 LAB
GLUCOSE BLD STRIP.AUTO-MCNC: 159 MG/DL (ref 65–117)
GLUCOSE BLD STRIP.AUTO-MCNC: 210 MG/DL (ref 65–117)
SERVICE CMNT-IMP: ABNORMAL
SERVICE CMNT-IMP: ABNORMAL

## 2023-03-24 PROCEDURE — 76210000006 HC OR PH I REC 0.5 TO 1 HR: Performed by: PODIATRIST

## 2023-03-24 PROCEDURE — 77030020754 HC CUF TRNQT 2BLA STRY -B: Performed by: PODIATRIST

## 2023-03-24 PROCEDURE — 77030020743 HC CAP PROTCT PIN BATR -A: Performed by: PODIATRIST

## 2023-03-24 PROCEDURE — 74011000250 HC RX REV CODE- 250: Performed by: PODIATRIST

## 2023-03-24 PROCEDURE — 82962 GLUCOSE BLOOD TEST: CPT

## 2023-03-24 PROCEDURE — 74011250637 HC RX REV CODE- 250/637: Performed by: ANESTHESIOLOGY

## 2023-03-24 PROCEDURE — 2709999900 HC NON-CHARGEABLE SUPPLY: Performed by: PODIATRIST

## 2023-03-24 PROCEDURE — 74011250636 HC RX REV CODE- 250/636: Performed by: NURSE ANESTHETIST, CERTIFIED REGISTERED

## 2023-03-24 PROCEDURE — 74011250636 HC RX REV CODE- 250/636: Performed by: ANESTHESIOLOGY

## 2023-03-24 PROCEDURE — 77030002916 HC SUT ETHLN J&J -A: Performed by: PODIATRIST

## 2023-03-24 PROCEDURE — 77030006788 HC BLD SAW OSC STRY -B: Performed by: PODIATRIST

## 2023-03-24 PROCEDURE — 77030002912 HC SUT ETHBND J&J -A: Performed by: PODIATRIST

## 2023-03-24 PROCEDURE — 77030031139 HC SUT VCRL2 J&J -A: Performed by: PODIATRIST

## 2023-03-24 PROCEDURE — 74011250636 HC RX REV CODE- 250/636: Performed by: PODIATRIST

## 2023-03-24 PROCEDURE — 77030039825 HC MSK NSL PAP SUPERNO2VA VYRM -B: Performed by: ANESTHESIOLOGY

## 2023-03-24 PROCEDURE — 74011000250 HC RX REV CODE- 250: Performed by: NURSE ANESTHETIST, CERTIFIED REGISTERED

## 2023-03-24 PROCEDURE — 77030010396 HC WRE FIX C CNMD -A: Performed by: PODIATRIST

## 2023-03-24 PROCEDURE — 76010000153 HC OR TIME 1.5 TO 2 HR: Performed by: PODIATRIST

## 2023-03-24 PROCEDURE — 77030002986 HC SUT PROL J&J -A: Performed by: PODIATRIST

## 2023-03-24 PROCEDURE — 76060000034 HC ANESTHESIA 1.5 TO 2 HR: Performed by: PODIATRIST

## 2023-03-24 PROCEDURE — C1713 ANCHOR/SCREW BN/BN,TIS/BN: HCPCS | Performed by: PODIATRIST

## 2023-03-24 DEVICE — C-WIRE PAK DOUBLE ENDED ORTHOPAEDIC WIRE, TROCAR, .045" (1.14 MM)
Type: IMPLANTABLE DEVICE | Site: FOOT | Status: FUNCTIONAL
Brand: C-WIRE

## 2023-03-24 DEVICE — SCREW, HEADLESS 2.0X13MM_TI GREEN
Type: IMPLANTABLE DEVICE | Site: FOOT | Status: FUNCTIONAL
Brand: VILEX DUAL THREAD SCREW

## 2023-03-24 RX ORDER — LIDOCAINE HYDROCHLORIDE 20 MG/ML
10 INJECTION, SOLUTION INFILTRATION; PERINEURAL ONCE
Status: DISCONTINUED | OUTPATIENT
Start: 2023-03-24 | End: 2023-03-24 | Stop reason: HOSPADM

## 2023-03-24 RX ORDER — MORPHINE SULFATE 2 MG/ML
2 INJECTION, SOLUTION INTRAMUSCULAR; INTRAVENOUS
Status: DISCONTINUED | OUTPATIENT
Start: 2023-03-24 | End: 2023-03-24 | Stop reason: HOSPADM

## 2023-03-24 RX ORDER — SODIUM CHLORIDE 0.9 % (FLUSH) 0.9 %
5-40 SYRINGE (ML) INJECTION EVERY 8 HOURS
Status: DISCONTINUED | OUTPATIENT
Start: 2023-03-24 | End: 2023-03-24 | Stop reason: HOSPADM

## 2023-03-24 RX ORDER — SODIUM CHLORIDE 0.9 % (FLUSH) 0.9 %
5-40 SYRINGE (ML) INJECTION AS NEEDED
Status: DISCONTINUED | OUTPATIENT
Start: 2023-03-24 | End: 2023-03-24 | Stop reason: HOSPADM

## 2023-03-24 RX ORDER — BUPIVACAINE HYDROCHLORIDE 5 MG/ML
INJECTION, SOLUTION EPIDURAL; INTRACAUDAL AS NEEDED
Status: DISCONTINUED | OUTPATIENT
Start: 2023-03-24 | End: 2023-03-24 | Stop reason: HOSPADM

## 2023-03-24 RX ORDER — LIDOCAINE HYDROCHLORIDE 10 MG/ML
0.1 INJECTION, SOLUTION EPIDURAL; INFILTRATION; INTRACAUDAL; PERINEURAL AS NEEDED
Status: DISCONTINUED | OUTPATIENT
Start: 2023-03-24 | End: 2023-03-24 | Stop reason: HOSPADM

## 2023-03-24 RX ORDER — SODIUM CHLORIDE, SODIUM LACTATE, POTASSIUM CHLORIDE, CALCIUM CHLORIDE 600; 310; 30; 20 MG/100ML; MG/100ML; MG/100ML; MG/100ML
125 INJECTION, SOLUTION INTRAVENOUS CONTINUOUS
Status: DISCONTINUED | OUTPATIENT
Start: 2023-03-24 | End: 2023-03-24 | Stop reason: HOSPADM

## 2023-03-24 RX ORDER — FENTANYL CITRATE 50 UG/ML
25 INJECTION, SOLUTION INTRAMUSCULAR; INTRAVENOUS
Status: DISCONTINUED | OUTPATIENT
Start: 2023-03-24 | End: 2023-03-24 | Stop reason: HOSPADM

## 2023-03-24 RX ORDER — OXYCODONE AND ACETAMINOPHEN 5; 325 MG/1; MG/1
1 TABLET ORAL AS NEEDED
Status: DISCONTINUED | OUTPATIENT
Start: 2023-03-24 | End: 2023-03-24 | Stop reason: HOSPADM

## 2023-03-24 RX ORDER — MIDAZOLAM HYDROCHLORIDE 1 MG/ML
1 INJECTION, SOLUTION INTRAMUSCULAR; INTRAVENOUS AS NEEDED
Status: DISCONTINUED | OUTPATIENT
Start: 2023-03-24 | End: 2023-03-24 | Stop reason: HOSPADM

## 2023-03-24 RX ORDER — DEXAMETHASONE SODIUM PHOSPHATE 4 MG/ML
4 INJECTION, SOLUTION INTRA-ARTICULAR; INTRALESIONAL; INTRAMUSCULAR; INTRAVENOUS; SOFT TISSUE ONCE
Status: DISCONTINUED | OUTPATIENT
Start: 2023-03-24 | End: 2023-03-24 | Stop reason: HOSPADM

## 2023-03-24 RX ORDER — HYDROMORPHONE HYDROCHLORIDE 1 MG/ML
0.2 INJECTION, SOLUTION INTRAMUSCULAR; INTRAVENOUS; SUBCUTANEOUS
Status: DISCONTINUED | OUTPATIENT
Start: 2023-03-24 | End: 2023-03-24 | Stop reason: HOSPADM

## 2023-03-24 RX ORDER — MIDAZOLAM HYDROCHLORIDE 1 MG/ML
INJECTION, SOLUTION INTRAMUSCULAR; INTRAVENOUS AS NEEDED
Status: DISCONTINUED | OUTPATIENT
Start: 2023-03-24 | End: 2023-03-24 | Stop reason: HOSPADM

## 2023-03-24 RX ORDER — ONDANSETRON 2 MG/ML
INJECTION INTRAMUSCULAR; INTRAVENOUS AS NEEDED
Status: DISCONTINUED | OUTPATIENT
Start: 2023-03-24 | End: 2023-03-24 | Stop reason: HOSPADM

## 2023-03-24 RX ORDER — LIDOCAINE HYDROCHLORIDE 20 MG/ML
INJECTION, SOLUTION EPIDURAL; INFILTRATION; INTRACAUDAL; PERINEURAL AS NEEDED
Status: DISCONTINUED | OUTPATIENT
Start: 2023-03-24 | End: 2023-03-24 | Stop reason: HOSPADM

## 2023-03-24 RX ORDER — PROPOFOL 10 MG/ML
INJECTION, EMULSION INTRAVENOUS AS NEEDED
Status: DISCONTINUED | OUTPATIENT
Start: 2023-03-24 | End: 2023-03-24 | Stop reason: HOSPADM

## 2023-03-24 RX ORDER — ONDANSETRON 2 MG/ML
4 INJECTION INTRAMUSCULAR; INTRAVENOUS AS NEEDED
Status: DISCONTINUED | OUTPATIENT
Start: 2023-03-24 | End: 2023-03-24 | Stop reason: HOSPADM

## 2023-03-24 RX ORDER — MIDAZOLAM HYDROCHLORIDE 1 MG/ML
0.5 INJECTION, SOLUTION INTRAMUSCULAR; INTRAVENOUS
Status: DISCONTINUED | OUTPATIENT
Start: 2023-03-24 | End: 2023-03-24 | Stop reason: HOSPADM

## 2023-03-24 RX ORDER — BUPIVACAINE HYDROCHLORIDE 5 MG/ML
10 INJECTION, SOLUTION EPIDURAL; INTRACAUDAL ONCE
Status: DISCONTINUED | OUTPATIENT
Start: 2023-03-24 | End: 2023-03-24 | Stop reason: HOSPADM

## 2023-03-24 RX ORDER — BUPIVACAINE HYDROCHLORIDE 5 MG/ML
10 INJECTION, SOLUTION EPIDURAL; INTRACAUDAL
Status: DISCONTINUED | OUTPATIENT
Start: 2023-03-24 | End: 2023-03-24 | Stop reason: HOSPADM

## 2023-03-24 RX ORDER — DEXAMETHASONE SODIUM PHOSPHATE 4 MG/ML
INJECTION, SOLUTION INTRA-ARTICULAR; INTRALESIONAL; INTRAMUSCULAR; INTRAVENOUS; SOFT TISSUE AS NEEDED
Status: DISCONTINUED | OUTPATIENT
Start: 2023-03-24 | End: 2023-03-24 | Stop reason: HOSPADM

## 2023-03-24 RX ORDER — FENTANYL CITRATE 50 UG/ML
50 INJECTION, SOLUTION INTRAMUSCULAR; INTRAVENOUS AS NEEDED
Status: DISCONTINUED | OUTPATIENT
Start: 2023-03-24 | End: 2023-03-24 | Stop reason: HOSPADM

## 2023-03-24 RX ORDER — DEXMEDETOMIDINE HYDROCHLORIDE 100 UG/ML
INJECTION, SOLUTION INTRAVENOUS AS NEEDED
Status: DISCONTINUED | OUTPATIENT
Start: 2023-03-24 | End: 2023-03-24 | Stop reason: HOSPADM

## 2023-03-24 RX ORDER — SODIUM CHLORIDE 9 MG/ML
1000 INJECTION, SOLUTION INTRAVENOUS CONTINUOUS
Status: DISCONTINUED | OUTPATIENT
Start: 2023-03-24 | End: 2023-03-24 | Stop reason: HOSPADM

## 2023-03-24 RX ORDER — FENTANYL CITRATE 50 UG/ML
INJECTION, SOLUTION INTRAMUSCULAR; INTRAVENOUS AS NEEDED
Status: DISCONTINUED | OUTPATIENT
Start: 2023-03-24 | End: 2023-03-24 | Stop reason: HOSPADM

## 2023-03-24 RX ORDER — SODIUM CHLORIDE 9 MG/ML
50 INJECTION, SOLUTION INTRAVENOUS CONTINUOUS
Status: DISCONTINUED | OUTPATIENT
Start: 2023-03-24 | End: 2023-03-24 | Stop reason: HOSPADM

## 2023-03-24 RX ORDER — PROPOFOL 10 MG/ML
INJECTION, EMULSION INTRAVENOUS
Status: DISCONTINUED | OUTPATIENT
Start: 2023-03-24 | End: 2023-03-24 | Stop reason: HOSPADM

## 2023-03-24 RX ORDER — DIPHENHYDRAMINE HYDROCHLORIDE 50 MG/ML
12.5 INJECTION, SOLUTION INTRAMUSCULAR; INTRAVENOUS AS NEEDED
Status: DISCONTINUED | OUTPATIENT
Start: 2023-03-24 | End: 2023-03-24 | Stop reason: HOSPADM

## 2023-03-24 RX ORDER — ACETAMINOPHEN 325 MG/1
650 TABLET ORAL ONCE
Status: COMPLETED | OUTPATIENT
Start: 2023-03-24 | End: 2023-03-24

## 2023-03-24 RX ADMIN — LIDOCAINE HYDROCHLORIDE 50 MG: 20 INJECTION, SOLUTION EPIDURAL; INFILTRATION; INTRACAUDAL; PERINEURAL at 09:26

## 2023-03-24 RX ADMIN — PROPOFOL 50 MG: 10 INJECTION, EMULSION INTRAVENOUS at 09:30

## 2023-03-24 RX ADMIN — ONDANSETRON HYDROCHLORIDE 4 MG: 2 INJECTION, SOLUTION INTRAMUSCULAR; INTRAVENOUS at 09:35

## 2023-03-24 RX ADMIN — FENTANYL CITRATE 25 MCG: 50 INJECTION, SOLUTION INTRAMUSCULAR; INTRAVENOUS at 09:45

## 2023-03-24 RX ADMIN — FENTANYL CITRATE 25 MCG: 50 INJECTION, SOLUTION INTRAMUSCULAR; INTRAVENOUS at 09:30

## 2023-03-24 RX ADMIN — DEXAMETHASONE SODIUM PHOSPHATE 4 MG: 4 INJECTION, SOLUTION INTRAMUSCULAR; INTRAVENOUS at 09:35

## 2023-03-24 RX ADMIN — PROPOFOL 75 MCG/KG/MIN: 10 INJECTION, EMULSION INTRAVENOUS at 09:26

## 2023-03-24 RX ADMIN — SODIUM CHLORIDE, POTASSIUM CHLORIDE, SODIUM LACTATE AND CALCIUM CHLORIDE 125 ML/HR: 600; 310; 30; 20 INJECTION, SOLUTION INTRAVENOUS at 09:09

## 2023-03-24 RX ADMIN — ACETAMINOPHEN 650 MG: 325 TABLET ORAL at 08:59

## 2023-03-24 RX ADMIN — FENTANYL CITRATE 25 MCG: 50 INJECTION, SOLUTION INTRAMUSCULAR; INTRAVENOUS at 09:26

## 2023-03-24 RX ADMIN — WATER 2 G: 1 INJECTION INTRAMUSCULAR; INTRAVENOUS; SUBCUTANEOUS at 09:35

## 2023-03-24 RX ADMIN — MIDAZOLAM 2 MG: 1 INJECTION INTRAMUSCULAR; INTRAVENOUS at 09:18

## 2023-03-24 RX ADMIN — FENTANYL CITRATE 25 MCG: 50 INJECTION, SOLUTION INTRAMUSCULAR; INTRAVENOUS at 10:20

## 2023-03-24 RX ADMIN — DEXMEDETOMIDINE HYDROCHLORIDE 10 MCG: 100 INJECTION, SOLUTION, CONCENTRATE INTRAVENOUS at 09:26

## 2023-03-24 RX ADMIN — DEXMEDETOMIDINE HYDROCHLORIDE 10 MCG: 100 INJECTION, SOLUTION, CONCENTRATE INTRAVENOUS at 09:45

## 2023-03-24 RX ADMIN — PROPOFOL 50 MG: 10 INJECTION, EMULSION INTRAVENOUS at 09:26

## 2023-03-24 NOTE — DISCHARGE INSTRUCTIONS
See Dr. Amaya Marquez post op instructions        ______________________________________________________________________    Anesthesia Discharge Instructions    After general anesthesia or intervenous sedation, for 24 hours or while taking prescription Narcotics:  Limit your activities  Do not drive or operate hazardous machinery  If you have not urinated within 8 hours after discharge, please contact your surgeon on call. Do not make important personal or business decisions  Do not drink alcoholic beverages    Report the following to your surgeon:  Excessive pain, swelling, redness or odor of or around the surgical area  Temperature over 100.5 degrees  Nausea and vomiting lasting longer than 4 hours or if unable to take medication  Any signs of decreased circulation or nerve impairment to extremity:  Change in color, persistent numbness, tingling, coldness or increased pain.   Any questions

## 2023-03-24 NOTE — BRIEF OP NOTE
Brief Postoperative Note    Patient: Margarette Daley  YOB: 1955  MRN: [id number]    Date of Procedure: 3/24/2023     Pre-Op Diagnosis: LEFT FOOT PAINFUL HARDWARE AND metatarsalgia HAMMERTOEs,  right foot plantar fascitis and tarsal tunnel    Post-Op Diagnosis: Same as preoperative diagnosis. Procedure(s):  LEFT FOOT REMOVAL PAINFUL HARDWARE, LEFT SECOND METATARASAL OSTEOTOMY, LEFT SECOND AND THIRD HAMMERTOE CORRECTION, PERCUTANEOUS RELEASE ON TOES FOUR AND FIVE, RIGHT FOOT CORTICO STERIOD SHOT    Surgeon(s):  Nelly Dozier DPM    Surgical Assistant: None    Anesthesia: MAC     Estimated Blood Loss (mL): Minimal    Complications: None    Specimens: * No specimens in log *     Implants:   Implant Name Type Inv.  Item Serial No.  Lot No. LRB No. Used Action   WIRE FIX L5IN [id number] DBL END TRCR 50/EA - SN/A  WIRE FIX L5IN [id number] DBL END TRCR 50/EA N/A [employer] [id number] Left 2 Implanted   SCREW NATHALIE COMPR HDLSS 2 THRD TI [id number] - SN/A  SCREW NATHALIE COMPR HDLSS 2 THRD TI [id number] N/A [employer] N/A Left 2 Implanted       Drains: * No LDAs found *    Findings: Patient tolerated the procedure and anesthesia well and left the operating room with all vital signs stable and vascular intact to the left foot    Electronically Signed by Keshav Sanford DPM on 3/24/2023 at 11:10 AM

## 2023-03-24 NOTE — ROUTINE PROCESS
Patient: Aquilino Talbot MRN: [id number]  SSN: [id number]   YOB: 1955  Age: 78 y.o. Sex: female     Patient is status post Procedure(s):  LEFT FOOT REMOVAL PAINFUL HARDWARE, LEFT SECOND METATARASAL OSTEOTOMY, LEFT SECOND AND THIRD HAMMERTOE CORRECTION, PERCUTANEOUS RELEASE ON TOES FOUR AND FIVE, RIGHT FOOT CORTICO STERIOD SHOT. Surgeon(s) and Role:     Martínez Johnson DPM - Primary    Local/Dose/Irrigation:  SEE mar                  Peripheral IV 03/24/23 Left Hand (Active)   Site Assessment Clean, dry, & intact 03/24/23 0902   Phlebitis Assessment 0 03/24/23 0902   Infiltration Assessment 0 03/24/23 0902   Dressing Status Clean, dry, & intact; New 03/24/23 0902   Dressing Type Transparent;Tape 03/24/23 0902   Hub Color/Line Status Green 03/24/23 0902                           Dressing/Packing:  Incision 03/24/23 Foot Left-Dressing/Treatment: Petroleum impregnated gauze;Gauze dressing/dressing sponge;Roll gauze; Cast padding;Coban/self-adherent bandages (03/24/23 1059)    Splint/Cast:  ]    Other:

## 2023-03-24 NOTE — ANESTHESIA PREPROCEDURE EVALUATION
Relevant Problems   RESPIRATORY SYSTEM   (+) Sleep apnea      NEUROLOGY   (+) Reactive depression      GASTROINTESTINAL   (+) Fatty liver      ENDOCRINE   (+) Arthritis   (+) Diabetes (HCC)   (+) Hypothyroid       Anesthetic History   No history of anesthetic complications            Review of Systems / Medical History  Patient summary reviewed, nursing notes reviewed and pertinent labs reviewed    Pulmonary  Within defined limits      Sleep apnea: CPAP           Neuro/Psych   Within defined limits  seizures         Cardiovascular  Within defined limits  Hypertension                   GI/Hepatic/Renal  Within defined limits         Liver disease     Endo/Other  Within defined limits  Diabetes: type 2  Hypothyroidism  Arthritis     Other Findings              Physical Exam    Airway  Mallampati: II  TM Distance: > 6 cm  Neck ROM: normal range of motion   Mouth opening: Normal     Cardiovascular  Regular rate and rhythm,  S1 and S2 normal,  no murmur, click, rub, or gallop             Dental  No notable dental hx       Pulmonary  Breath sounds clear to auscultation               Abdominal  GI exam deferred       Other Findings            Anesthetic Plan    ASA: 3  Anesthesia type: MAC            Anesthetic plan and risks discussed with: Patient

## 2023-03-24 NOTE — PERIOP NOTES
Went over discharge instructions with patient and patients sister. Both verbalized knowledge and able to teach back. Instructions were also reviewed with patient at pre op appointment per Steve Azevedo. pt received another copy.

## 2023-03-24 NOTE — ANESTHESIA POSTPROCEDURE EVALUATION
Procedure(s):  LEFT FOOT REMOVAL PAINFUL HARDWARE, LEFT SECOND METATARASAL OSTEOTOMY, LEFT SECOND AND THIRD HAMMERTOE CORRECTION, PERCUTANEOUS RELEASE ON TOES FOUR AND FIVE, RIGHT FOOT CORTICO STERIOD SHOT. MAC    Anesthesia Post Evaluation      Multimodal analgesia: multimodal analgesia used between 6 hours prior to anesthesia start to PACU discharge  Patient location during evaluation: PACU  Patient participation: complete - patient participated  Level of consciousness: awake  Pain score: 2  Pain management: adequate  Airway patency: patent  Anesthetic complications: no  Cardiovascular status: acceptable  Respiratory status: acceptable  Hydration status: acceptable  Comments: I have evaluated the patient and meets criteria for discharge from PACU. Mercy Hairston MD  Post anesthesia nausea and vomiting:  controlled  Final Post Anesthesia Temperature Assessment:  Normothermia (36.0-37.5 degrees C)      INITIAL Post-op Vital signs:   Vitals Value Taken Time   /47 03/24/23 1125   Temp 36.7 °C (98 °F) 03/24/23 1113   Pulse 63 03/24/23 1128   Resp 14 03/24/23 1128   SpO2 97 % 03/24/23 1128   Vitals shown include unvalidated device data.

## 2023-03-24 NOTE — PERIOP NOTES
Spoke with Dr. Raoul Jacome regarding pt blood sugar of 210. Dr. Raoul Jacome did not want to treat. No new orders at this time.

## 2023-03-25 NOTE — OP NOTES
[address]   OPERATIVE REPORT    Name:  Avon Bamberger  MR#:  [id number]  :  1955  ACCOUNT #:  [de-identified]  DATE OF SERVICE:  2023    PREOPERATIVE DIAGNOSES:  1. Left foot second metatarsalgia with severely rigid hammertoe deformity, second digit, causing pain and swelling. 2.  Left foot hammertoe deformity, third digit. 3.  Left foot hammertoe deformity, fourth digit. 4.  Left foot hammertoe deformity, fifth digit. 5.  Left foot painful hardware to the first metatarsal   6.  right foot plantar fasciitis and tarsal tunnel causing pain and swelling to the right heel. POSTOPERATIVE DIAGNOSES:  1. Left foot second metatarsalgia with severely rigid hammertoe deformity, second digit, causing pain and swelling. 2.  Left foot hammertoe deformity, third digit. 3.  Left foot hammertoe deformity, fourth digit. 4.  Left foot hammertoe deformity, fifth digit. 5.  Left foot painful hardware to the first metatarsal   6.  right foot plantar fasciitis and tarsal tunnel causing pain and swelling to the right heel. PROCEDURES PERFORMED:  1. Left foot second metatarsal osteotomy with two 2.0 Vilex cannulated screw fixations. 2.  Left foot second digit arthroplasty, proximal interphalangeal joint, with 0.045 K-wire fixation. 3.  Left foot third digit arthroplasty, proximal interphalangeal joint, with 0.045 K-wire, and percutaneous release of flexor tendon  4. Left foot removed painful hardware to the first metatarsal, two Vilex screws removed. 5.  Left foot percutaneous flexor tendon release, fourth digit. 6.  Left foot percutaneous flexor tendon release, fifth digit,. 7.  Right foot corticosteroid injection 1 mL Depo-Medrol with 2 mL of 0.5% Marcaine plain to the right heel. SURGEON:  Ashlee Jacobs DPM    ASSISTANT:  None. ANESTHESIA:  IV sedation with local.    TOURNIQUET:  Left ankle tourniquet at 250 mmHg, approximately 1 hour.     COMPLICATIONS: None.    SPECIMENS REMOVED:  No pathology. IMPLANTS:  1. Two 0.045 K-wires; one into the second digit and one into the third digit. 2.  Two 2.0 Vilex cannulated screws into the second metatarsal and removed two screw fixations to the first metatarsal.    ESTIMATED BLOOD LOSS:  Less than 5 mL. INDICATIONS:  The patient has been dealing with pain for years with the second toe drifting and adducting into the hallux. The patient had her bunion done approximately one and a half years ago and the screw fixations are just coming into the skin. You can feel how prominent and painful they are causing swelling and pain to the first metatarsal all the time. The patient would like to have everything fixed, and since were are removing the screw fixations, she wants the second digit corrected due to being so severely contracted. She has wrapped the toes for years, has had anti-inflammatories, ice, wider shoes, orthotics, everything to try to control the second digit from drifting and keeping it in place. PROCEDURE:  The patient was brought to the operating room, placed on the operating table in supine position. 2 g Ancef was given before the procedure started. A 50:50 mixture of 1% lidocaine plain and 0.5% Marcaine plain. 20 mL of this 50:50 mixture was injected into the first metatarsal around the screws and into the second ray and at the second, third, fourth, and fifth digits. Next, the left foot was then prepped and draped in the usual sterile manner, and the left foot was elevated, exsanguinated with an Esmarch, and the left ankle tourniquet elevated to 250 mmHg. Before the case did start, the tourniquet was elevated, did have a time-out, and we all agreed the patient was the patient and the left foot and the procedures being done. Before the case did start as well and I did the block on the left foot, I did do the steroid injection into the heel.   I cleaned off the right heel with alcohol and injected into the tarsal tunnel and along the medial branch of the nerve and then a small amount into the plantar fascial band of all the steroid injection to calm down the right heel. Next, a right small incision, 1 cm in length, was taken down with a 15-blade directly down to bone, and the screw fixations were immediately seen at the first metatarsal.  The 2.0 screw fixation was spinning and coming out of the bone. This was removed. The 3.0 was a little bit more buried and cleaned off and the 3.0 screw was removed as well and everything was removed in toto. Next, attention was directed to the second metatarsal where a curvilinear incision was made around the second metatarsal joint and then all the way up to the second toe at the proximal interphalangeal joint. Incision was deepened down through subcutaneous tissue. Care was taken to avoid the extensor tendon. A linear incision was made into the capsule medially to expose the second metatarsal head, left the extensor tendon intact, not cutting anything yet, not knowing if I had to shorten/lengthen the tendon. Next, a McGlamry elevator was put under the second metatarsal.  The second metatarsal was very healthy and was not very too prominent at the plantar aspect. I used a sagittal saw and made an osteotomy from dorsal distal to proximal plantar and then slid the fragment proximal to drop the second toe down which it did bring the second toe down very nicely. I put two 2.0 cannulated screws across the osteotomy, Vilex, which were very stable and held the osteotomy in place. I removed some of the fragment of the bone with a rongeur. This did take the pressure off the second metatarsophalangeal joint which was very prominent. The base of the proximal phalanx was very healthy and had good cartilage. Next, I went along the medial side and exposed the proximal interphalangeal joint.   The bone was just very prominent and irregular from being twisted for so long, but the cartilage was still good as well as at the base of the intermediate, so I just decided to do an arthroplasty versus fusion and then fusing the all the toes to keep them all flexible. I used a sagittal saw and removed the head of the proximal phalanx. Next, I did a percutaneous release of the flexor tendon on the fourth and fifth digits and also did it of the third, but at that point decided when I loaded the forefoot, the fourth and fifth digits were straightened out really nice. The third digit was still contracted at the proximal interphalangeal joint, so I decided to do an arthroplasty of the third digit. I made a small incision across the proximal interphalangeal joint. I exposed the joint and made a linear incision into the capsule at the proximal interphalangeal joint. I exposed the proximal phalanx head which was actually very healthy. I removed the head of the proximal phalanx with a sagittal saw and this relaxed the third digit tremendously. I decided at this point to put K-wires across the second and third digits just to keep them more solid for healing. I took a 0.045 K-wire through the base of the intermediate through the end of the toe and then put back into the proximal phalanx on both the second and third digits and then bent, cut, and capped the K-wires. This really held the toes in really good position. I released the tourniquet at this point. Next, I reapproximated the capsule with 4-0 Vicryl at the second and third digits and then the capsule of the second metatarsal with 3-0 Vicryl. All the subcutaneous tissues at the first, second, and third digits were reapproximated with 4-0 Vicryl. The skin at the first had 4-0 nylon in simple interrupted fashion. The third digit was also 4-0 nylon in simple interrupted.   The digits plantarly three, four, and five had a stitch at the plantar aspect of the toe and then the second metatarsal with 4-0 Prolene in a subcuticular fashion and then some nylon as well. 20 mL of 0.5% Marcaine plain was injected into the entire forefoot for blocks at all the metatarsals so the patient was comfortable postop. Then, 1 mL of dexamethasone was injected into the first metatarsal joint so that from having the screw fixation she did have soreness there at the metatarsal which was swollen. Adaptic, 4x4s, Kerlix, and Coban were placed around the left foot. The patient tolerated the procedure and anesthesia well and left the operating room with all vital signs stable and vascular status intact to the left foot. The patient will be resting, elevating, and icing all weekend, and does have the next three-month appointments set up. We will see the patient next week for a postop visit.       Trang Vargas DPM [id number]  D:  03/25/2023 8:29  T:  03/25/2023 14:43  JOB #:  [id number]

## 2023-04-19 ENCOUNTER — PATIENT MESSAGE (OUTPATIENT)
Dept: FAMILY MEDICINE CLINIC | Age: 68
End: 2023-04-19

## 2023-04-19 DIAGNOSIS — E11.9 DIABETES MELLITUS (HCC): ICD-10-CM

## 2023-04-19 RX ORDER — BLOOD SUGAR DIAGNOSTIC
STRIP MISCELLANEOUS
Qty: 100 STRIP | Refills: 5 | Status: SHIPPED | OUTPATIENT
Start: 2023-04-19

## 2023-04-19 NOTE — TELEPHONE ENCOUNTER
From: Fletcher Lynch  To: Carlye Devlin MD  Sent: 4/19/2023 9:54 AM EDT  Subject: test strips    Can you please call in a prescription for test strips for a One Touch Ultra #25 to Long?  Thank you

## 2023-04-19 NOTE — TELEPHONE ENCOUNTER
4/19/2023  12:06 PM    Received query from lee, about cholesterol, DM and statin:    Reviewed labs, patient has been compliant wit fatty acids and diet,     Lab Results   Component Value Date/Time    Cholesterol, total 138 03/03/2023 09:12 AM    Cholesterol, total 192 08/04/2022 09:00 AM    Cholesterol, total 195 02/03/2022 09:17 AM    Cholesterol, total 224 (H) 08/02/2021 09:18 AM    Cholesterol, total 212 (H) 11/17/2020 09:10 AM    HDL Cholesterol 46 03/03/2023 09:12 AM    HDL Cholesterol 53 08/04/2022 09:00 AM    HDL Cholesterol 50 02/03/2022 09:17 AM    HDL Cholesterol 65 08/02/2021 09:18 AM    HDL Cholesterol 54 11/17/2020 09:10 AM    LDL, calculated 63 03/03/2023 09:12 AM    LDL, calculated 94 08/04/2022 09:00 AM    LDL, calculated 90.8 02/03/2022 09:17 AM    LDL, calculated 111 (H) 08/02/2021 09:18 AM    LDL, calculated 105 (H) 11/17/2020 09:10 AM    LDL, calculated 74 05/27/2020 08:54 AM    LDL, calculated 76 08/22/2019 09:21 AM    LDL, calculated 86 09/19/2018 10:24 AM    LDL, calculated 89 03/05/2018 09:16 AM    Triglyceride 171 (H) 03/03/2023 09:12 AM    Triglyceride 267 (H) 08/04/2022 09:00 AM    Triglyceride 271 (H) 02/03/2022 09:17 AM    Triglyceride 280 (H) 08/02/2021 09:18 AM    Triglyceride 310 (H) 11/17/2020 09:10 AM    CHOL/HDL Ratio 3.9 02/03/2022 09:17 AM     Vilma Murphy MD

## 2023-04-26 RX ORDER — COLESEVELAM 180 1/1
TABLET ORAL
Qty: 540 TABLET | Refills: 3 | Status: SHIPPED | OUTPATIENT
Start: 2023-04-26

## 2023-05-04 ENCOUNTER — TELEPHONE (OUTPATIENT)
Dept: FAMILY MEDICINE CLINIC | Age: 68
End: 2023-05-04

## 2023-05-04 DIAGNOSIS — E11.9 TYPE 2 DIABETES MELLITUS WITHOUT COMPLICATION, WITHOUT LONG-TERM CURRENT USE OF INSULIN (HCC): ICD-10-CM

## 2023-05-04 RX ORDER — SEMAGLUTIDE 1.34 MG/ML
1 INJECTION, SOLUTION SUBCUTANEOUS
Qty: 9 EACH | Refills: 3 | Status: SHIPPED | OUTPATIENT
Start: 2023-05-04

## 2023-05-22 RX ORDER — ERGOCALCIFEROL 1.25 MG/1
CAPSULE ORAL
COMMUNITY
Start: 2022-05-26

## 2023-05-22 RX ORDER — PREGABALIN 75 MG/1
150 CAPSULE ORAL
COMMUNITY
Start: 2022-07-05

## 2023-05-22 RX ORDER — METFORMIN HYDROCHLORIDE 500 MG/1
1000 TABLET, EXTENDED RELEASE ORAL 2 TIMES DAILY
COMMUNITY
Start: 2023-01-03

## 2023-05-22 RX ORDER — VITAMIN B COMPLEX
2500 TABLET ORAL DAILY
COMMUNITY
Start: 2010-12-10

## 2023-05-22 RX ORDER — ALBUTEROL SULFATE 90 UG/1
2 AEROSOL, METERED RESPIRATORY (INHALATION) EVERY 4 HOURS PRN
COMMUNITY
Start: 2023-02-03

## 2023-05-22 RX ORDER — COLESEVELAM 180 1/1
TABLET ORAL
COMMUNITY
Start: 2023-04-26

## 2023-05-22 RX ORDER — MOMETASONE FUROATE 50 UG/1
2 SPRAY, METERED NASAL
COMMUNITY

## 2023-05-22 RX ORDER — VENLAFAXINE HYDROCHLORIDE 75 MG/1
1 CAPSULE, EXTENDED RELEASE ORAL DAILY
COMMUNITY
Start: 2023-03-15

## 2023-05-22 RX ORDER — DICLOFENAC SODIUM 75 MG/1
TABLET, DELAYED RELEASE ORAL
COMMUNITY
Start: 2022-04-12

## 2023-05-22 RX ORDER — CETIRIZINE HYDROCHLORIDE 10 MG/1
10 TABLET ORAL NIGHTLY
COMMUNITY

## 2023-05-22 RX ORDER — LEVOTHYROXINE SODIUM 0.2 MG/1
TABLET ORAL
COMMUNITY
Start: 2022-07-22 | End: 2023-07-10

## 2023-05-22 RX ORDER — EZETIMIBE 10 MG/1
1 TABLET ORAL DAILY
COMMUNITY
Start: 2022-08-24

## 2023-05-22 RX ORDER — FLUTICASONE PROPIONATE 50 MCG
SPRAY, SUSPENSION (ML) NASAL
COMMUNITY
Start: 2014-07-28

## 2023-05-22 RX ORDER — SEMAGLUTIDE 1.34 MG/ML
1 INJECTION, SOLUTION SUBCUTANEOUS
COMMUNITY
Start: 2023-05-04

## 2023-05-22 RX ORDER — OMEPRAZOLE 20 MG/1
20 TABLET, DELAYED RELEASE ORAL DAILY
COMMUNITY
Start: 2019-09-20

## 2023-05-22 RX ORDER — LISINOPRIL 5 MG/1
5 TABLET ORAL DAILY
COMMUNITY
Start: 2022-09-21

## 2023-07-10 RX ORDER — LEVOTHYROXINE SODIUM 200 MCG
TABLET ORAL
Qty: 90 TABLET | Refills: 2 | Status: SHIPPED | OUTPATIENT
Start: 2023-07-10

## 2023-08-19 RX ORDER — EZETIMIBE 10 MG/1
TABLET ORAL
Qty: 90 TABLET | Refills: 3 | Status: SHIPPED | OUTPATIENT
Start: 2023-08-19

## 2023-09-05 RX ORDER — LISINOPRIL 5 MG/1
TABLET ORAL
Qty: 90 TABLET | Refills: 1 | Status: SHIPPED | OUTPATIENT
Start: 2023-09-05

## 2023-11-10 LAB
25(OH)D3+25(OH)D2 SERPL-MCNC: 92.9 NG/ML (ref 30–100)
ALBUMIN SERPL-MCNC: 4.2 G/DL (ref 3.9–4.9)
ALBUMIN/GLOB SERPL: 1.8 {RATIO} (ref 1.2–2.2)
ALP SERPL-CCNC: 81 IU/L (ref 44–121)
ALT SERPL-CCNC: 26 IU/L (ref 0–32)
AST SERPL-CCNC: 27 IU/L (ref 0–40)
BASOPHILS # BLD AUTO: 0.1 X10E3/UL (ref 0–0.2)
BASOPHILS NFR BLD AUTO: 1 %
BILIRUB SERPL-MCNC: 0.3 MG/DL (ref 0–1.2)
BUN SERPL-MCNC: 7 MG/DL (ref 8–27)
BUN/CREAT SERPL: 11 (ref 12–28)
CALCIUM SERPL-MCNC: 9.6 MG/DL (ref 8.7–10.3)
CHLORIDE SERPL-SCNC: 101 MMOL/L (ref 96–106)
CHOLEST SERPL-MCNC: 185 MG/DL (ref 100–199)
CO2 SERPL-SCNC: 23 MMOL/L (ref 20–29)
CREAT SERPL-MCNC: 0.66 MG/DL (ref 0.57–1)
EGFRCR SERPLBLD CKD-EPI 2021: 97 ML/MIN/1.73
EOSINOPHIL # BLD AUTO: 0.1 X10E3/UL (ref 0–0.4)
EOSINOPHIL NFR BLD AUTO: 2 %
ERYTHROCYTE [DISTWIDTH] IN BLOOD BY AUTOMATED COUNT: 14 % (ref 11.7–15.4)
EST. AVERAGE GLUCOSE BLD GHB EST-MCNC: 131 MG/DL
GLOBULIN SER CALC-MCNC: 2.3 G/DL (ref 1.5–4.5)
GLUCOSE SERPL-MCNC: 100 MG/DL (ref 70–99)
HBA1C MFR BLD: 6.2 % (ref 4.8–5.6)
HCT VFR BLD AUTO: 36.5 % (ref 34–46.6)
HDLC SERPL-MCNC: 57 MG/DL
HGB BLD-MCNC: 11.8 G/DL (ref 11.1–15.9)
IMM GRANULOCYTES # BLD AUTO: 0 X10E3/UL (ref 0–0.1)
IMM GRANULOCYTES NFR BLD AUTO: 0 %
IMP & REVIEW OF LAB RESULTS: NORMAL
LDLC SERPL CALC-MCNC: 96 MG/DL (ref 0–99)
LYMPHOCYTES # BLD AUTO: 2 X10E3/UL (ref 0.7–3.1)
LYMPHOCYTES NFR BLD AUTO: 34 %
MCH RBC QN AUTO: 26.3 PG (ref 26.6–33)
MCHC RBC AUTO-ENTMCNC: 32.3 G/DL (ref 31.5–35.7)
MCV RBC AUTO: 81 FL (ref 79–97)
MONOCYTES # BLD AUTO: 0.4 X10E3/UL (ref 0.1–0.9)
MONOCYTES NFR BLD AUTO: 7 %
NEUTROPHILS # BLD AUTO: 3.4 X10E3/UL (ref 1.4–7)
NEUTROPHILS NFR BLD AUTO: 56 %
PLATELET # BLD AUTO: 247 X10E3/UL (ref 150–450)
POTASSIUM SERPL-SCNC: 4.8 MMOL/L (ref 3.5–5.2)
PROT SERPL-MCNC: 6.5 G/DL (ref 6–8.5)
RBC # BLD AUTO: 4.49 X10E6/UL (ref 3.77–5.28)
SODIUM SERPL-SCNC: 140 MMOL/L (ref 134–144)
T4 FREE SERPL-MCNC: 1.38 NG/DL (ref 0.82–1.77)
TRIGL SERPL-MCNC: 190 MG/DL (ref 0–149)
TSH SERPL DL<=0.005 MIU/L-ACNC: 0.54 UIU/ML (ref 0.45–4.5)
VLDLC SERPL CALC-MCNC: 32 MG/DL (ref 5–40)
WBC # BLD AUTO: 6.1 X10E3/UL (ref 3.4–10.8)

## 2023-11-12 ENCOUNTER — TELEPHONE (OUTPATIENT)
Age: 68
End: 2023-11-12

## 2023-11-14 ENCOUNTER — TELEPHONE (OUTPATIENT)
Age: 68
End: 2023-11-14

## 2023-11-14 NOTE — TELEPHONE ENCOUNTER
----- Message from Michael Tyler MD sent at 11/12/2023  7:12 PM EST -----  Please schedule follow up visit to review results. Due for diabetic follow up.

## 2023-12-08 SDOH — ECONOMIC STABILITY: FOOD INSECURITY: WITHIN THE PAST 12 MONTHS, THE FOOD YOU BOUGHT JUST DIDN'T LAST AND YOU DIDN'T HAVE MONEY TO GET MORE.: NEVER TRUE

## 2023-12-08 SDOH — ECONOMIC STABILITY: TRANSPORTATION INSECURITY
IN THE PAST 12 MONTHS, HAS LACK OF TRANSPORTATION KEPT YOU FROM MEETINGS, WORK, OR FROM GETTING THINGS NEEDED FOR DAILY LIVING?: NO

## 2023-12-08 SDOH — ECONOMIC STABILITY: FOOD INSECURITY: WITHIN THE PAST 12 MONTHS, YOU WORRIED THAT YOUR FOOD WOULD RUN OUT BEFORE YOU GOT MONEY TO BUY MORE.: NEVER TRUE

## 2023-12-08 SDOH — ECONOMIC STABILITY: HOUSING INSECURITY
IN THE LAST 12 MONTHS, WAS THERE A TIME WHEN YOU DID NOT HAVE A STEADY PLACE TO SLEEP OR SLEPT IN A SHELTER (INCLUDING NOW)?: NO

## 2023-12-09 NOTE — PROGRESS NOTES
Hemanth Hill (:  1955) is a 78 y.o. female,Established patient, here for evaluation of the following chief complaint(s):  Diabetes and Results (Lab follow up)        ASSESSMENT/PLAN:  1. Type 2 diabetes mellitus without complication, without long-term current use of insulin (HCC)  -     Microalbumin / Creatinine Urine Ratio; Future  2. Essential (primary) hypertension  3. Pure hypercholesterolemia, unspecified  4. Hypothyroidism, unspecified type  5. Vitamin D deficiency, unspecified    Pt plans to retire end of . Return in about 6 months (around 2024). SUBJECTIVE/OBJECTIVE:  HPI    Follow-up chronic conditions including type 2 diabetes, hypertension, hypercholesterolemia, hypothyroidism, vitamin D deficiency. Review recent lab results. She is up-to-date on eye exam done at [facility] eye clinic . Mammogram and bone density test done through gynecology office of Dr. Edison Mclean .       Current Outpatient Medications   Medication Sig Dispense Refill    traMADol (ULTRAM) 50 MG tablet 1 TABLET BY MOUTH EVERY 4-6 HOURS AS NEEDED, AS NEEDED      Cholecalciferol (VITAMIN D3) 125 MCG (5000 UT) TABS Take 1 tablet by mouth daily      lisinopril (PRINIVIL;ZESTRIL) 5 MG tablet TAKE 1 TABLET DAILY 90 tablet 1    ezetimibe (ZETIA) 10 MG tablet TAKE 1 TABLET DAILY 90 tablet 3    levothyroxine (SYNTHROID) 200 MCG tablet TAKE 1 TABLET DAILY BEFORE BREAKFAST 90 tablet 2    albuterol sulfate HFA (PROVENTIL;VENTOLIN;PROAIR) 108 (90 Base) MCG/ACT inhaler Inhale 2 puffs into the lungs every 4 hours as needed      cetirizine (ZYRTEC) 10 MG tablet Take 1 tablet by mouth nightly      colesevelam (WELCHOL) 625 MG tablet TAKE 3 TABLETS 2 TIMES     DAILY WITH MEALS      sublingual tablet cyanocobalamin 2500 MCG SUBL Take 1,000 tablets by mouth daily      diclofenac (VOLTAREN) 75 MG EC tablet TAKE 1 TABLET TWICE A DAY AS NEEDED      fluticasone (FLONASE) 50 MCG/ACT nasal spray USE 2 SPRAYS TO BOTH NOSTRIL

## 2023-12-11 ENCOUNTER — OFFICE VISIT (OUTPATIENT)
Age: 68
End: 2023-12-11
Payer: COMMERCIAL

## 2023-12-11 VITALS
DIASTOLIC BLOOD PRESSURE: 60 MMHG | SYSTOLIC BLOOD PRESSURE: 118 MMHG | WEIGHT: 181.4 LBS | TEMPERATURE: 97 F | HEIGHT: 63 IN | RESPIRATION RATE: 15 BRPM | OXYGEN SATURATION: 96 % | HEART RATE: 80 BPM | BODY MASS INDEX: 32.14 KG/M2

## 2023-12-11 DIAGNOSIS — E55.9 VITAMIN D DEFICIENCY, UNSPECIFIED: ICD-10-CM

## 2023-12-11 DIAGNOSIS — E03.9 HYPOTHYROIDISM, UNSPECIFIED TYPE: ICD-10-CM

## 2023-12-11 DIAGNOSIS — E11.9 TYPE 2 DIABETES MELLITUS WITHOUT COMPLICATION, WITHOUT LONG-TERM CURRENT USE OF INSULIN (HCC): Primary | ICD-10-CM

## 2023-12-11 DIAGNOSIS — E78.00 PURE HYPERCHOLESTEROLEMIA, UNSPECIFIED: ICD-10-CM

## 2023-12-11 DIAGNOSIS — I10 ESSENTIAL (PRIMARY) HYPERTENSION: ICD-10-CM

## 2023-12-11 PROCEDURE — 3078F DIAST BP <80 MM HG: CPT | Performed by: FAMILY MEDICINE

## 2023-12-11 PROCEDURE — 3074F SYST BP LT 130 MM HG: CPT | Performed by: FAMILY MEDICINE

## 2023-12-11 PROCEDURE — 99214 OFFICE O/P EST MOD 30 MIN: CPT | Performed by: FAMILY MEDICINE

## 2023-12-11 PROCEDURE — 3044F HG A1C LEVEL LT 7.0%: CPT | Performed by: FAMILY MEDICINE

## 2023-12-11 PROCEDURE — 1123F ACP DISCUSS/DSCN MKR DOCD: CPT | Performed by: FAMILY MEDICINE

## 2023-12-11 RX ORDER — TRAMADOL HYDROCHLORIDE 50 MG/1
TABLET ORAL
COMMUNITY
Start: 2023-10-19

## 2023-12-11 SDOH — ECONOMIC STABILITY: FOOD INSECURITY: WITHIN THE PAST 12 MONTHS, YOU WORRIED THAT YOUR FOOD WOULD RUN OUT BEFORE YOU GOT MONEY TO BUY MORE.: NEVER TRUE

## 2023-12-11 SDOH — ECONOMIC STABILITY: FOOD INSECURITY: WITHIN THE PAST 12 MONTHS, THE FOOD YOU BOUGHT JUST DIDN'T LAST AND YOU DIDN'T HAVE MONEY TO GET MORE.: NEVER TRUE

## 2023-12-11 SDOH — ECONOMIC STABILITY: INCOME INSECURITY: HOW HARD IS IT FOR YOU TO PAY FOR THE VERY BASICS LIKE FOOD, HOUSING, MEDICAL CARE, AND HEATING?: NOT HARD AT ALL

## 2023-12-12 LAB
CREAT UR-MCNC: 126 MG/DL
MICROALBUMIN UR-MCNC: 0.76 MG/DL
MICROALBUMIN/CREAT UR-RTO: 6 MG/G (ref 0–30)
SPECIMEN HOLD: NORMAL

## 2023-12-13 ENCOUNTER — TELEPHONE (OUTPATIENT)
Age: 68
End: 2023-12-13

## 2024-01-24 DIAGNOSIS — E11.9 TYPE 2 DIABETES MELLITUS WITHOUT COMPLICATION, WITHOUT LONG-TERM CURRENT USE OF INSULIN (HCC): Primary | ICD-10-CM

## 2024-01-24 RX ORDER — SEMAGLUTIDE 1.34 MG/ML
1 INJECTION, SOLUTION SUBCUTANEOUS
Qty: 3 ML | Refills: 12 | Status: SHIPPED | OUTPATIENT
Start: 2024-01-24

## 2024-01-24 NOTE — TELEPHONE ENCOUNTER
----- Message from Kate Perez sent at 1/24/2024  3:28 PM EST -----  Regarding: ozempic  Contact: [phone]  Hello I have retired and now on Medicare, a Welcare prescriptions and a supplement. Can you please call in a refill for Ozempic to [facility] on [address]?  If it is too costly, we may need to look at something else. Thank you.

## 2024-02-05 ENCOUNTER — TELEPHONE (OUTPATIENT)
Age: 69
End: 2024-02-05

## 2024-02-05 RX ORDER — LEVOTHYROXINE SODIUM 0.2 MG/1
TABLET ORAL
Qty: 90 TABLET | Refills: 2 | Status: SHIPPED | OUTPATIENT
Start: 2024-02-05

## 2024-02-05 NOTE — TELEPHONE ENCOUNTER
Please call pt. I don't see that she has upcoming appt.  She needs IPPE visit. Please schedule appt.

## 2024-02-05 NOTE — TELEPHONE ENCOUNTER
----- Message from Radha Guardado sent at 2/5/2024  8:13 AM EST -----  Regarding: FW: bloodwork  Contact: [phone]  Requesting lab orders be put in for upcoming appt.     ----- Message -----  From: Kate Perez  Sent: 2/3/2024   9:45 PM EST  To: [facility] Clinical Staff  Subject: bloodwork                                        I have an appointment with you on the 27th. Can you please send the bloodwork order to [facility] on on [facility]? When should i get the bloodwork done?

## 2024-02-06 ENCOUNTER — TELEPHONE (OUTPATIENT)
Age: 69
End: 2024-02-06

## 2024-02-06 NOTE — TELEPHONE ENCOUNTER
I left a detailed message for pt. She is not due for labs until May. I know she is due for Welcome to Medicare Visit also. This can wait until May. We can do her labs at that same visit. Medicare only covers fasting lipid and sugar as part of this visit. Visit includes an EKG.  I suggest she reschedule Welcome to Medicare visit for May.

## 2024-02-06 NOTE — TELEPHONE ENCOUNTER
----- Message from Tianna Deleon-AURELIA Elias sent at 2/6/2024 10:12 AM EST -----  Subject: Message to Provider    QUESTIONS  Information for Provider? Has MWV 3/20/24. Requesting bloodwork orders be   faxed to [facility] on [place] [address] so she can have results   at visit. Please call patient.   ---------------------------------------------------------------------------  --------------  CALL BACK INFO  [phone]; OK to leave message on voicemail  ---------------------------------------------------------------------------  --------------  SCRIPT ANSWERS  Relationship to Patient? Self

## 2024-02-19 RX ORDER — EZETIMIBE 10 MG/1
10 TABLET ORAL DAILY
Qty: 90 TABLET | Refills: 3 | Status: SHIPPED | OUTPATIENT
Start: 2024-02-19 | End: 2025-02-18

## 2024-03-15 ENCOUNTER — TELEPHONE (OUTPATIENT)
Age: 69
End: 2024-03-15

## 2024-03-15 DIAGNOSIS — E11.9 TYPE 2 DIABETES MELLITUS WITHOUT COMPLICATION, WITHOUT LONG-TERM CURRENT USE OF INSULIN (HCC): Primary | ICD-10-CM

## 2024-03-15 DIAGNOSIS — E78.00 PURE HYPERCHOLESTEROLEMIA, UNSPECIFIED: ICD-10-CM

## 2024-03-15 RX ORDER — COLESEVELAM 180 1/1
TABLET ORAL
Qty: 540 TABLET | Refills: 3 | Status: SHIPPED | OUTPATIENT
Start: 2024-03-15

## 2024-03-15 NOTE — TELEPHONE ENCOUNTER
----- Message from Radha Guardado sent at 3/15/2024 11:43 AM EDT -----  Regarding: FW: Welchol  Contact: [phone]    ----- Message -----  From: Kate Perez  Sent: 3/15/2024  11:05 AM EDT  To: [facility] Clinical Staff  Subject: Welchol                                          Can you please send a refil for Welchol (generic) oe something less [facility] [address] 90 days please.

## 2024-03-29 ENCOUNTER — TELEPHONE (OUTPATIENT)
Age: 69
End: 2024-03-29

## 2024-03-29 DIAGNOSIS — F32.1 CURRENT MODERATE EPISODE OF MAJOR DEPRESSIVE DISORDER WITHOUT PRIOR EPISODE (HCC): Primary | ICD-10-CM

## 2024-03-29 RX ORDER — VENLAFAXINE HYDROCHLORIDE 75 MG/1
75 CAPSULE, EXTENDED RELEASE ORAL DAILY
Qty: 90 CAPSULE | Refills: 1 | Status: SHIPPED | OUTPATIENT
Start: 2024-03-29

## 2024-03-30 NOTE — TELEPHONE ENCOUNTER
----- Message from Kate Perez sent at 3/29/2024  7:14 PM EDT -----  Regarding: Refil  Contact: [phone]  I have changed my Pharmacy to [facility] on [facility] [phone] fax . Please call in a refil on venlafaxine. Thank you!

## 2024-04-26 DIAGNOSIS — E11.9 TYPE 2 DIABETES MELLITUS WITHOUT COMPLICATION, WITHOUT LONG-TERM CURRENT USE OF INSULIN (HCC): Primary | ICD-10-CM

## 2024-04-26 DIAGNOSIS — E03.9 HYPOTHYROIDISM, UNSPECIFIED TYPE: ICD-10-CM

## 2024-04-26 NOTE — TELEPHONE ENCOUNTER
----- Message from Kate Perez sent at 4/25/2024  9:32 PM EDT -----  Regarding: Refils  Contact: [phone]  Please send 30 day prescriptions for metformin and levothyroxine to [facility]?

## 2024-04-27 RX ORDER — METFORMIN HYDROCHLORIDE 500 MG/1
1000 TABLET, EXTENDED RELEASE ORAL 2 TIMES DAILY
Qty: 120 TABLET | Refills: 5 | Status: SHIPPED | OUTPATIENT
Start: 2024-04-27

## 2024-04-27 RX ORDER — LEVOTHYROXINE SODIUM 0.2 MG/1
TABLET ORAL
Qty: 30 TABLET | Refills: 5 | Status: SHIPPED | OUTPATIENT
Start: 2024-04-27

## 2024-05-07 ENCOUNTER — TELEPHONE (OUTPATIENT)
Age: 69
End: 2024-05-07

## 2024-05-07 DIAGNOSIS — Z85.3 PERSONAL HISTORY OF MALIGNANT NEOPLASM OF BREAST: Primary | ICD-10-CM

## 2024-05-07 NOTE — TELEPHONE ENCOUNTER
----- Message from Radha Guardado sent at 5/6/2024 12:38 PM EDT -----  Regarding: FW: Prescription for bras  Contact: [phone]    ----- Message -----  From: Kate Perez  Sent: 5/6/2024  12:28 PM EDT  To: [facility] Clinical Staff  Subject: Prescription for bras                            Good morning, can you please send a prescription for bras and a prosthesis to [facility] [phone]?   Thank you!

## 2024-05-08 ENCOUNTER — TELEPHONE (OUTPATIENT)
Age: 69
End: 2024-05-08

## 2024-05-08 DIAGNOSIS — E11.9 TYPE 2 DIABETES MELLITUS WITHOUT COMPLICATION, WITHOUT LONG-TERM CURRENT USE OF INSULIN (HCC): Primary | ICD-10-CM

## 2024-05-08 DIAGNOSIS — Z79.899 ENCOUNTER FOR LONG-TERM CURRENT USE OF MEDICATION: ICD-10-CM

## 2024-05-08 DIAGNOSIS — E78.00 HYPERCHOLESTEROLEMIA: ICD-10-CM

## 2024-05-08 DIAGNOSIS — F32.9 REACTIVE DEPRESSION: ICD-10-CM

## 2024-05-08 DIAGNOSIS — M19.90 ARTHRITIS: ICD-10-CM

## 2024-05-08 DIAGNOSIS — E03.9 HYPOTHYROIDISM, UNSPECIFIED TYPE: ICD-10-CM

## 2024-05-08 DIAGNOSIS — E55.9 VITAMIN D DEFICIENCY: ICD-10-CM

## 2024-05-25 LAB
25(OH)D3+25(OH)D2 SERPL-MCNC: 76 NG/ML (ref 30–100)
ALBUMIN SERPL-MCNC: 4.1 G/DL (ref 3.9–4.9)
ALBUMIN/GLOB SERPL: 1.7 {RATIO} (ref 1.2–2.2)
ALP SERPL-CCNC: 84 IU/L (ref 44–121)
ALT SERPL-CCNC: 23 IU/L (ref 0–32)
AST SERPL-CCNC: 17 IU/L (ref 0–40)
BASOPHILS # BLD AUTO: 0 X10E3/UL (ref 0–0.2)
BASOPHILS NFR BLD AUTO: 1 %
BILIRUB SERPL-MCNC: 0.2 MG/DL (ref 0–1.2)
BUN SERPL-MCNC: 9 MG/DL (ref 8–27)
BUN/CREAT SERPL: 14 (ref 12–28)
CALCIUM SERPL-MCNC: 9.8 MG/DL (ref 8.7–10.3)
CHLORIDE SERPL-SCNC: 100 MMOL/L (ref 96–106)
CHOLEST SERPL-MCNC: 156 MG/DL (ref 100–199)
CO2 SERPL-SCNC: 23 MMOL/L (ref 20–29)
CREAT SERPL-MCNC: 0.65 MG/DL (ref 0.57–1)
EGFRCR SERPLBLD CKD-EPI 2021: 96 ML/MIN/1.73
EOSINOPHIL # BLD AUTO: 0.1 X10E3/UL (ref 0–0.4)
EOSINOPHIL NFR BLD AUTO: 2 %
ERYTHROCYTE [DISTWIDTH] IN BLOOD BY AUTOMATED COUNT: 14.2 % (ref 11.7–15.4)
GLOBULIN SER CALC-MCNC: 2.4 G/DL (ref 1.5–4.5)
GLUCOSE SERPL-MCNC: 89 MG/DL (ref 70–99)
HBA1C MFR BLD: 6 % (ref 4.8–5.6)
HCT VFR BLD AUTO: 38.5 % (ref 34–46.6)
HDLC SERPL-MCNC: 47 MG/DL
HGB BLD-MCNC: 12.3 G/DL (ref 11.1–15.9)
IMM GRANULOCYTES # BLD AUTO: 0 X10E3/UL (ref 0–0.1)
IMM GRANULOCYTES NFR BLD AUTO: 0 %
IMP & REVIEW OF LAB RESULTS: NORMAL
LDLC SERPL CALC-MCNC: 75 MG/DL (ref 0–99)
LYMPHOCYTES # BLD AUTO: 1.8 X10E3/UL (ref 0.7–3.1)
LYMPHOCYTES NFR BLD AUTO: 32 %
MCH RBC QN AUTO: 25.9 PG (ref 26.6–33)
MCHC RBC AUTO-ENTMCNC: 31.9 G/DL (ref 31.5–35.7)
MCV RBC AUTO: 81 FL (ref 79–97)
MONOCYTES # BLD AUTO: 0.5 X10E3/UL (ref 0.1–0.9)
MONOCYTES NFR BLD AUTO: 9 %
NEUTROPHILS # BLD AUTO: 3.1 X10E3/UL (ref 1.4–7)
NEUTROPHILS NFR BLD AUTO: 56 %
PLATELET # BLD AUTO: 273 X10E3/UL (ref 150–450)
POTASSIUM SERPL-SCNC: 5 MMOL/L (ref 3.5–5.2)
PROT SERPL-MCNC: 6.5 G/DL (ref 6–8.5)
RBC # BLD AUTO: 4.75 X10E6/UL (ref 3.77–5.28)
SODIUM SERPL-SCNC: 138 MMOL/L (ref 134–144)
T4 FREE SERPL-MCNC: 2.03 NG/DL (ref 0.82–1.77)
TRIGL SERPL-MCNC: 202 MG/DL (ref 0–149)
TSH SERPL DL<=0.005 MIU/L-ACNC: <0.005 UIU/ML (ref 0.45–4.5)
VLDLC SERPL CALC-MCNC: 34 MG/DL (ref 5–40)
WBC # BLD AUTO: 5.6 X10E3/UL (ref 3.4–10.8)

## 2024-05-30 ENCOUNTER — TELEPHONE (OUTPATIENT)
Age: 69
End: 2024-05-30

## 2024-05-30 DIAGNOSIS — E03.9 HYPOTHYROIDISM, UNSPECIFIED TYPE: Primary | ICD-10-CM

## 2024-05-30 RX ORDER — LEVOTHYROXINE SODIUM 175 UG/1
175 TABLET ORAL DAILY
Qty: 30 TABLET | Refills: 5 | Status: SHIPPED | OUTPATIENT
Start: 2024-05-30

## 2024-05-31 NOTE — TELEPHONE ENCOUNTER
----- Message from Radha Guardado sent at 5/30/2024  4:19 PM EDT -----  Regarding: FW: Levothyroxine  Contact: [phone]    ----- Message -----  From: Kate Perez  Sent: 5/30/2024   2:45 PM EDT  To: [facility] Clinical Staff  Subject: Levothyroxine                                    I have a refil for 200mcg ready. From my recent bloodwork is anything changing?

## 2024-06-15 SDOH — HEALTH STABILITY: PHYSICAL HEALTH: ON AVERAGE, HOW MANY MINUTES DO YOU ENGAGE IN EXERCISE AT THIS LEVEL?: 40 MIN

## 2024-06-15 SDOH — HEALTH STABILITY: PHYSICAL HEALTH: ON AVERAGE, HOW MANY DAYS PER WEEK DO YOU ENGAGE IN MODERATE TO STRENUOUS EXERCISE (LIKE A BRISK WALK)?: 5 DAYS

## 2024-06-15 ASSESSMENT — LIFESTYLE VARIABLES
HOW OFTEN DO YOU HAVE A DRINK CONTAINING ALCOHOL: NEVER
HOW MANY STANDARD DRINKS CONTAINING ALCOHOL DO YOU HAVE ON A TYPICAL DAY: 0
HOW OFTEN DO YOU HAVE SIX OR MORE DRINKS ON ONE OCCASION: 1
HOW MANY STANDARD DRINKS CONTAINING ALCOHOL DO YOU HAVE ON A TYPICAL DAY: PATIENT DOES NOT DRINK
HOW OFTEN DO YOU HAVE A DRINK CONTAINING ALCOHOL: 1

## 2024-06-15 ASSESSMENT — PATIENT HEALTH QUESTIONNAIRE - PHQ9
8. MOVING OR SPEAKING SO SLOWLY THAT OTHER PEOPLE COULD HAVE NOTICED. OR THE OPPOSITE, BEING SO FIGETY OR RESTLESS THAT YOU HAVE BEEN MOVING AROUND A LOT MORE THAN USUAL: NOT AT ALL
6. FEELING BAD ABOUT YOURSELF - OR THAT YOU ARE A FAILURE OR HAVE LET YOURSELF OR YOUR FAMILY DOWN: NOT AT ALL
5. POOR APPETITE OR OVEREATING: NOT AT ALL
9. THOUGHTS THAT YOU WOULD BE BETTER OFF DEAD, OR OF HURTING YOURSELF: NOT AT ALL
3. TROUBLE FALLING OR STAYING ASLEEP: NOT AT ALL
10. IF YOU CHECKED OFF ANY PROBLEMS, HOW DIFFICULT HAVE THESE PROBLEMS MADE IT FOR YOU TO DO YOUR WORK, TAKE CARE OF THINGS AT HOME, OR GET ALONG WITH OTHER PEOPLE: NOT DIFFICULT AT ALL
SUM OF ALL RESPONSES TO PHQ QUESTIONS 1-9: 0
1. LITTLE INTEREST OR PLEASURE IN DOING THINGS: NOT AT ALL
SUM OF ALL RESPONSES TO PHQ QUESTIONS 1-9: 0
4. FEELING TIRED OR HAVING LITTLE ENERGY: NOT AT ALL
7. TROUBLE CONCENTRATING ON THINGS, SUCH AS READING THE NEWSPAPER OR WATCHING TELEVISION: NOT AT ALL
SUM OF ALL RESPONSES TO PHQ QUESTIONS 1-9: 0
2. FEELING DOWN, DEPRESSED OR HOPELESS: NOT AT ALL
SUM OF ALL RESPONSES TO PHQ9 QUESTIONS 1 & 2: 0
SUM OF ALL RESPONSES TO PHQ QUESTIONS 1-9: 0

## 2024-06-19 ENCOUNTER — OFFICE VISIT (OUTPATIENT)
Age: 69
End: 2024-06-19
Payer: MEDICARE

## 2024-06-19 VITALS
WEIGHT: 164 LBS | RESPIRATION RATE: 16 BRPM | HEART RATE: 71 BPM | OXYGEN SATURATION: 97 % | HEIGHT: 63 IN | DIASTOLIC BLOOD PRESSURE: 70 MMHG | TEMPERATURE: 98.4 F | BODY MASS INDEX: 29.06 KG/M2 | SYSTOLIC BLOOD PRESSURE: 120 MMHG

## 2024-06-19 DIAGNOSIS — I10 ESSENTIAL (PRIMARY) HYPERTENSION: ICD-10-CM

## 2024-06-19 DIAGNOSIS — E11.9 TYPE 2 DIABETES MELLITUS WITHOUT COMPLICATION, WITHOUT LONG-TERM CURRENT USE OF INSULIN (HCC): ICD-10-CM

## 2024-06-19 DIAGNOSIS — E03.9 HYPOTHYROIDISM, UNSPECIFIED TYPE: ICD-10-CM

## 2024-06-19 DIAGNOSIS — Z00.00 MEDICARE ANNUAL WELLNESS VISIT, SUBSEQUENT: Primary | ICD-10-CM

## 2024-06-19 DIAGNOSIS — R21 RASH OF HAND: ICD-10-CM

## 2024-06-19 DIAGNOSIS — S62.652D CLOSED NONDISPLACED FRACTURE OF MIDDLE PHALANX OF RIGHT MIDDLE FINGER WITH ROUTINE HEALING, SUBSEQUENT ENCOUNTER: ICD-10-CM

## 2024-06-19 DIAGNOSIS — E78.00 HYPERCHOLESTEROLEMIA: ICD-10-CM

## 2024-06-19 PROCEDURE — 3078F DIAST BP <80 MM HG: CPT | Performed by: FAMILY MEDICINE

## 2024-06-19 PROCEDURE — G8427 DOCREV CUR MEDS BY ELIG CLIN: HCPCS | Performed by: FAMILY MEDICINE

## 2024-06-19 PROCEDURE — 3074F SYST BP LT 130 MM HG: CPT | Performed by: FAMILY MEDICINE

## 2024-06-19 PROCEDURE — 2022F DILAT RTA XM EVC RTNOPTHY: CPT | Performed by: FAMILY MEDICINE

## 2024-06-19 PROCEDURE — 3017F COLORECTAL CA SCREEN DOC REV: CPT | Performed by: FAMILY MEDICINE

## 2024-06-19 PROCEDURE — 1090F PRES/ABSN URINE INCON ASSESS: CPT | Performed by: FAMILY MEDICINE

## 2024-06-19 PROCEDURE — 3044F HG A1C LEVEL LT 7.0%: CPT | Performed by: FAMILY MEDICINE

## 2024-06-19 PROCEDURE — G8399 PT W/DXA RESULTS DOCUMENT: HCPCS | Performed by: FAMILY MEDICINE

## 2024-06-19 PROCEDURE — G0439 PPPS, SUBSEQ VISIT: HCPCS | Performed by: FAMILY MEDICINE

## 2024-06-19 PROCEDURE — G8417 CALC BMI ABV UP PARAM F/U: HCPCS | Performed by: FAMILY MEDICINE

## 2024-06-19 PROCEDURE — 99214 OFFICE O/P EST MOD 30 MIN: CPT | Performed by: FAMILY MEDICINE

## 2024-06-19 PROCEDURE — 1123F ACP DISCUSS/DSCN MKR DOCD: CPT | Performed by: FAMILY MEDICINE

## 2024-06-19 PROCEDURE — 1036F TOBACCO NON-USER: CPT | Performed by: FAMILY MEDICINE

## 2024-06-19 RX ORDER — FENOFIBRATE 145 MG/1
145 TABLET, COATED ORAL DAILY
Qty: 90 TABLET | Refills: 3 | Status: SHIPPED | OUTPATIENT
Start: 2024-06-19

## 2024-06-19 RX ORDER — LISINOPRIL 5 MG/1
5 TABLET ORAL DAILY
Qty: 90 TABLET | Refills: 3 | Status: SHIPPED | OUTPATIENT
Start: 2024-06-19

## 2024-06-19 RX ORDER — CLOTRIMAZOLE AND BETAMETHASONE DIPROPIONATE 10; .64 MG/G; MG/G
CREAM TOPICAL
Qty: 45 G | Refills: 0 | Status: SHIPPED | OUTPATIENT
Start: 2024-06-19

## 2024-06-19 NOTE — PROGRESS NOTES
Medicare Annual Wellness Visit    Kate Perez is here for Medicare AWV, Finger Pain (Finger pain on middle finger on right hand/Patient stated that she broke finger in April and was seen at [facility]), and Hand Problem (Dryness on right hand that started a month ago)    Assessment & Plan   Medicare annual wellness visit, subsequent  Hypercholesterolemia  -     fenofibrate (TRICOR) 145 MG tablet; Take 1 tablet by mouth daily For high triglycerides., Disp-90 tablet, R-3This will replace Welchol prescription.  DC Welchol.Normal  Type 2 diabetes mellitus without complication, without long-term current use of insulin (HCC)  Closed nondisplaced fracture of middle phalanx of right middle finger with routine healing, subsequent encounter  -     [facility] - Tonya Baumann MD, Orthopedic Surgery (elbow, hand, wrist), [place]  Rash of hand  -     clotrimazole-betamethasone (LOTRISONE) 1-0.05 % cream; Apply topically 2 times daily., Disp-45 g, R-0, Normal  Hypothyroidism, unspecified type  Essential (primary) hypertension    Dose Levothyroxine adjusted.  Plan repeat thyroid labs in 2-3 months.  Replace Welchol with Fenofibrate due to cost.  Cont Ozempic.  [facility]er to [facility] for finger fracture.  Lotrisone crm for hand rash.      Recommendations for Preventive Services Due: see orders and patient instructions/AVS.  Recommended screening schedule for the next 5-10 years is provided to the patient in written form: see Patient Instructions/AVS.     Return in about 2 months (around 8/19/2024) for thyroid labs.     Subjective   The following acute and/or chronic problems were also addressed today:  Planned wt loss.  On Ozempic for DM.  Less appetite. Enjoying alf.  Welchol cost is high. Has high trigs.  Will switch to fenofibrate.  BP under control.  Broke R middle finger in April. Still swollen and painful.  Seen at [facility].  Rash on R palm.  Itches.  UTD on mammogram and DEXA.    Patient's complete Health Risk

## 2024-06-19 NOTE — PROGRESS NOTES
Identified pt with two pt identifiers(name and ).    Chief Complaint   Patient presents with    Medicare AWV    Finger Pain     Finger pain on middle finger on right hand  Patient stated that she broke finger in April and was seen at [facility]    Hand Problem     Dryness on right hand that started a month ago        Health Maintenance Due   Topic    COVID-19 Vaccine ( season)    Annual Wellness Visit (Medicare)        Wt Readings from Last 3 Encounters:   24 74.4 kg (164 lb)   23 82.3 kg (181 lb 6.4 oz)   23 88.5 kg (195 lb)     Temp Readings from Last 3 Encounters:   24 98.4 °F (36.9 °C) (Temporal)   23 97 °F (36.1 °C) (Temporal)     BP Readings from Last 3 Encounters:   24 120/70   23 118/60   23 108/60     Pulse Readings from Last 3 Encounters:   24 71   23 80   23 65           Depression Screening:  :         6/15/2024    10:48 AM 3/20/2023    11:00 AM 3/6/2023     2:00 PM 8/10/2022     3:00 PM 2/3/2022     8:00 AM   PHQ-9 Questionaire   Little interest or pleasure in doing things 0 0 0 0 1   Feeling down, depressed, or hopeless 0 0 0 0 1   Trouble falling or staying asleep, or sleeping too much 0       Feeling tired or having little energy 0       Poor appetite or overeating 0       Feeling bad about yourself - or that you are a failure or have let yourself or your family down 0       Trouble concentrating on things, such as reading the newspaper or watching television 0       Moving or speaking so slowly that other people could have noticed. Or the opposite - being so fidgety or restless that you have been moving around a lot more than usual 0       Thoughts that you would be better off dead, or of hurting yourself in some way 0       PHQ-9 Total Score 0 0 0 0 2   If you checked off any problems, how difficult have these problems made it for you to do your work, take care of things at home, or get along with other people? 0

## 2024-06-19 NOTE — PATIENT INSTRUCTIONS
lifestyle, illnesses that may run in your family, and various assessments and screenings as appropriate.    After reviewing your medical record and screening and assessments performed today your provider may have ordered immunizations, labs, imaging, and/or referrals for you.  A list of these orders (if applicable) as well as your Preventive Care list are included within your After Visit Summary for your review.    Other Preventive Recommendations:    A preventive eye exam performed by an eye specialist is recommended every 1-2 years to screen for glaucoma; cataracts, macular degeneration, and other eye disorders.  A preventive dental visit is recommended every 6 months.  Try to get at least 150 minutes of exercise per week or 10,000 steps per day on a pedometer .  Order or download the FREE \"Exercise & Physical Activity: Your Everyday Guide\" from [employer] [employer]. Call 1-169.999.3423 or search [employer] [place] [employer] online.  You need 0281-1617 mg of calcium and 4241-3563 IU of vitamin D per day. It is possible to meet your calcium requirement with diet alone, but a vitamin D supplement is usually necessary to meet this goal.  When exposed to the sun, use a sunscreen that protects against both UVA and UVB radiation with an SPF of 30 or greater. Reapply every 2 to 3 hours or after sweating, drying off with a towel, or swimming.  Always wear a seat belt when traveling in a car. Always wear a helmet when riding a bicycle or motorcycle.

## 2024-06-30 ENCOUNTER — TELEPHONE (OUTPATIENT)
Age: 69
End: 2024-06-30

## 2024-06-30 DIAGNOSIS — E03.9 HYPOTHYROIDISM, UNSPECIFIED TYPE: Primary | ICD-10-CM

## 2024-06-30 NOTE — TELEPHONE ENCOUNTER
----- Message from Kate Perez sent at 6/28/2024  4:57 PM EDT -----  Regarding: Bloodwork for thyroid  Contact: [phone]  I made an appointment at [facility] on [facility] on July 30th. Please send them the order before then. Thank you.

## 2024-07-31 ENCOUNTER — TELEPHONE (OUTPATIENT)
Age: 69
End: 2024-07-31

## 2024-07-31 DIAGNOSIS — R21 RASH OF HAND: ICD-10-CM

## 2024-07-31 DIAGNOSIS — E03.9 HYPOTHYROIDISM, UNSPECIFIED TYPE: Primary | ICD-10-CM

## 2024-07-31 LAB
T4 FREE SERPL-MCNC: 3.13 NG/DL (ref 0.82–1.77)
TSH SERPL DL<=0.005 MIU/L-ACNC: <0.005 UIU/ML (ref 0.45–4.5)

## 2024-07-31 RX ORDER — CLOTRIMAZOLE AND BETAMETHASONE DIPROPIONATE 10; .64 MG/G; MG/G
CREAM TOPICAL
Qty: 45 G | Refills: 5 | Status: SHIPPED | OUTPATIENT
Start: 2024-07-31

## 2024-07-31 RX ORDER — LEVOTHYROXINE SODIUM 0.15 MG/1
150 TABLET ORAL
Qty: 30 TABLET | Refills: 5 | Status: SHIPPED | OUTPATIENT
Start: 2024-07-31

## 2024-07-31 NOTE — TELEPHONE ENCOUNTER
I advised patient of abnormal thyroid levels, too high.  Will need to decrease dose of levothyroxine.  Will send new prescription levothyroxine 150 mcg tablet daily.  Take by itself on empty stomach.  Plan to repeat thyroid levels again in 2 months.  She prefers to go to [facility] in [place].

## 2024-09-08 DIAGNOSIS — F32.1 CURRENT MODERATE EPISODE OF MAJOR DEPRESSIVE DISORDER WITHOUT PRIOR EPISODE (HCC): ICD-10-CM

## 2024-09-08 RX ORDER — VENLAFAXINE HYDROCHLORIDE 75 MG/1
CAPSULE, EXTENDED RELEASE ORAL DAILY
Qty: 90 CAPSULE | Refills: 1 | Status: SHIPPED | OUTPATIENT
Start: 2024-09-08

## 2024-09-16 ENCOUNTER — OFFICE VISIT (OUTPATIENT)
Age: 69
End: 2024-09-16
Payer: MEDICARE

## 2024-09-16 ENCOUNTER — LAB (OUTPATIENT)
Age: 69
End: 2024-09-16
Payer: MEDICARE

## 2024-09-16 VITALS
HEART RATE: 68 BPM | BODY MASS INDEX: 28.17 KG/M2 | TEMPERATURE: 97.7 F | HEIGHT: 63 IN | DIASTOLIC BLOOD PRESSURE: 62 MMHG | OXYGEN SATURATION: 99 % | WEIGHT: 159 LBS | SYSTOLIC BLOOD PRESSURE: 110 MMHG | RESPIRATION RATE: 16 BRPM

## 2024-09-16 DIAGNOSIS — E03.9 HYPOTHYROIDISM, UNSPECIFIED TYPE: ICD-10-CM

## 2024-09-16 DIAGNOSIS — E11.40 TYPE 2 DIABETES MELLITUS WITH DIABETIC NEUROPATHY, WITHOUT LONG-TERM CURRENT USE OF INSULIN (HCC): ICD-10-CM

## 2024-09-16 DIAGNOSIS — Z01.818 PRE-OP EXAM: Primary | ICD-10-CM

## 2024-09-16 DIAGNOSIS — Z79.899 OTHER LONG TERM (CURRENT) DRUG THERAPY: ICD-10-CM

## 2024-09-16 DIAGNOSIS — E55.9 VITAMIN D DEFICIENCY: ICD-10-CM

## 2024-09-16 DIAGNOSIS — E78.00 HYPERCHOLESTEROLEMIA: ICD-10-CM

## 2024-09-16 DIAGNOSIS — Z23 NEEDS FLU SHOT: ICD-10-CM

## 2024-09-16 DIAGNOSIS — E11.9 TYPE 2 DIABETES MELLITUS WITHOUT COMPLICATION, WITHOUT LONG-TERM CURRENT USE OF INSULIN (HCC): ICD-10-CM

## 2024-09-16 DIAGNOSIS — M20.41 HAMMER TOE OF RIGHT FOOT: ICD-10-CM

## 2024-09-16 DIAGNOSIS — M77.41 METATARSALGIA OF RIGHT FOOT: ICD-10-CM

## 2024-09-16 DIAGNOSIS — I10 ESSENTIAL (PRIMARY) HYPERTENSION: ICD-10-CM

## 2024-09-16 PROCEDURE — 99214 OFFICE O/P EST MOD 30 MIN: CPT | Performed by: FAMILY MEDICINE

## 2024-09-16 PROCEDURE — G0008 ADMIN INFLUENZA VIRUS VAC: HCPCS | Performed by: FAMILY MEDICINE

## 2024-09-16 RX ORDER — ASPIRIN 81 MG/1
81 TABLET, CHEWABLE ORAL DAILY
COMMUNITY

## 2024-09-16 ASSESSMENT — PATIENT HEALTH QUESTIONNAIRE - PHQ9
SUM OF ALL RESPONSES TO PHQ QUESTIONS 1-9: 0
10. IF YOU CHECKED OFF ANY PROBLEMS, HOW DIFFICULT HAVE THESE PROBLEMS MADE IT FOR YOU TO DO YOUR WORK, TAKE CARE OF THINGS AT HOME, OR GET ALONG WITH OTHER PEOPLE: NOT DIFFICULT AT ALL
8. MOVING OR SPEAKING SO SLOWLY THAT OTHER PEOPLE COULD HAVE NOTICED. OR THE OPPOSITE, BEING SO FIGETY OR RESTLESS THAT YOU HAVE BEEN MOVING AROUND A LOT MORE THAN USUAL: NOT AT ALL
2. FEELING DOWN, DEPRESSED OR HOPELESS: NOT AT ALL
SUM OF ALL RESPONSES TO PHQ QUESTIONS 1-9: 0
6. FEELING BAD ABOUT YOURSELF - OR THAT YOU ARE A FAILURE OR HAVE LET YOURSELF OR YOUR FAMILY DOWN: NOT AT ALL
3. TROUBLE FALLING OR STAYING ASLEEP: NOT AT ALL
9. THOUGHTS THAT YOU WOULD BE BETTER OFF DEAD, OR OF HURTING YOURSELF: NOT AT ALL
SUM OF ALL RESPONSES TO PHQ QUESTIONS 1-9: 0
SUM OF ALL RESPONSES TO PHQ QUESTIONS 1-9: 0
1. LITTLE INTEREST OR PLEASURE IN DOING THINGS: NOT AT ALL
5. POOR APPETITE OR OVEREATING: NOT AT ALL
SUM OF ALL RESPONSES TO PHQ9 QUESTIONS 1 & 2: 0
4. FEELING TIRED OR HAVING LITTLE ENERGY: NOT AT ALL
7. TROUBLE CONCENTRATING ON THINGS, SUCH AS READING THE NEWSPAPER OR WATCHING TELEVISION: NOT AT ALL

## 2024-09-17 ENCOUNTER — TELEPHONE (OUTPATIENT)
Age: 69
End: 2024-09-17

## 2024-09-17 DIAGNOSIS — E03.9 HYPOTHYROIDISM, UNSPECIFIED TYPE: Primary | ICD-10-CM

## 2024-09-17 DIAGNOSIS — E83.52 HYPERCALCEMIA: ICD-10-CM

## 2024-09-17 LAB
25(OH)D3 SERPL-MCNC: 68.6 NG/ML (ref 30–100)
ALBUMIN SERPL-MCNC: 3.8 G/DL (ref 3.5–5)
ALBUMIN/GLOB SERPL: 1.1 (ref 1.1–2.2)
ALP SERPL-CCNC: 65 U/L (ref 45–117)
ALT SERPL-CCNC: 26 U/L (ref 12–78)
ANION GAP SERPL CALC-SCNC: 5 MMOL/L (ref 2–12)
AST SERPL-CCNC: 21 U/L (ref 15–37)
BASOPHILS # BLD: 0 K/UL (ref 0–0.1)
BASOPHILS NFR BLD: 1 % (ref 0–1)
BILIRUB SERPL-MCNC: 0.3 MG/DL (ref 0.2–1)
BUN SERPL-MCNC: 11 MG/DL (ref 6–20)
BUN/CREAT SERPL: 18 (ref 12–20)
CALCIUM SERPL-MCNC: 10.7 MG/DL (ref 8.5–10.1)
CHLORIDE SERPL-SCNC: 106 MMOL/L (ref 97–108)
CHOLEST SERPL-MCNC: 171 MG/DL
CO2 SERPL-SCNC: 29 MMOL/L (ref 21–32)
CREAT SERPL-MCNC: 0.62 MG/DL (ref 0.55–1.02)
DIFFERENTIAL METHOD BLD: NORMAL
EOSINOPHIL # BLD: 0.1 K/UL (ref 0–0.4)
EOSINOPHIL NFR BLD: 2 % (ref 0–7)
ERYTHROCYTE [DISTWIDTH] IN BLOOD BY AUTOMATED COUNT: 13.6 % (ref 11.5–14.5)
EST. AVERAGE GLUCOSE BLD GHB EST-MCNC: 123 MG/DL
GLOBULIN SER CALC-MCNC: 3.4 G/DL (ref 2–4)
GLUCOSE SERPL-MCNC: 80 MG/DL (ref 65–100)
HBA1C MFR BLD: 5.9 % (ref 4–5.6)
HCT VFR BLD AUTO: 38.6 % (ref 35–47)
HDLC SERPL-MCNC: 54 MG/DL
HDLC SERPL: 3.2 (ref 0–5)
HGB BLD-MCNC: 12.2 G/DL (ref 11.5–16)
IMM GRANULOCYTES # BLD AUTO: 0 K/UL (ref 0–0.04)
IMM GRANULOCYTES NFR BLD AUTO: 0 % (ref 0–0.5)
LDLC SERPL CALC-MCNC: 85 MG/DL (ref 0–100)
LYMPHOCYTES # BLD: 2 K/UL (ref 0.8–3.5)
LYMPHOCYTES NFR BLD: 38 % (ref 12–49)
MCH RBC QN AUTO: 26.4 PG (ref 26–34)
MCHC RBC AUTO-ENTMCNC: 31.6 G/DL (ref 30–36.5)
MCV RBC AUTO: 83.5 FL (ref 80–99)
MONOCYTES # BLD: 0.5 K/UL (ref 0–1)
MONOCYTES NFR BLD: 10 % (ref 5–13)
NEUTS SEG # BLD: 2.6 K/UL (ref 1.8–8)
NEUTS SEG NFR BLD: 49 % (ref 32–75)
NRBC # BLD: 0 K/UL (ref 0–0.01)
NRBC BLD-RTO: 0 PER 100 WBC
PLATELET # BLD AUTO: 280 K/UL (ref 150–400)
PMV BLD AUTO: 10.4 FL (ref 8.9–12.9)
POTASSIUM SERPL-SCNC: 5 MMOL/L (ref 3.5–5.1)
PROT SERPL-MCNC: 7.2 G/DL (ref 6.4–8.2)
RBC # BLD AUTO: 4.62 M/UL (ref 3.8–5.2)
SODIUM SERPL-SCNC: 140 MMOL/L (ref 136–145)
T4 FREE SERPL-MCNC: 2.3 NG/DL (ref 0.8–1.5)
T4 FREE SERPL-MCNC: 2.3 NG/DL (ref 0.8–1.5)
TRIGL SERPL-MCNC: 160 MG/DL
TSH SERPL DL<=0.05 MIU/L-ACNC: <0.01 UIU/ML (ref 0.36–3.74)
TSH SERPL DL<=0.05 MIU/L-ACNC: <0.01 UIU/ML (ref 0.36–3.74)
VLDLC SERPL CALC-MCNC: 32 MG/DL
WBC # BLD AUTO: 5.3 K/UL (ref 3.6–11)

## 2024-09-17 RX ORDER — LEVOTHYROXINE SODIUM 125 UG/1
125 TABLET ORAL DAILY
Qty: 90 TABLET | Refills: 1 | Status: SHIPPED | OUTPATIENT
Start: 2024-09-17

## 2024-09-24 ENCOUNTER — TELEPHONE (OUTPATIENT)
Age: 69
End: 2024-09-24

## 2024-09-24 LAB — CALCIUM SERPL-MCNC: 9.9 MG/DL (ref 8.7–10.3)

## 2024-09-30 RX ORDER — AMPICILLIN TRIHYDRATE 250 MG
1000 CAPSULE ORAL DAILY
COMMUNITY

## 2024-09-30 RX ORDER — MAGNESIUM GLUCONATE 27 MG(500)
500 TABLET ORAL DAILY
COMMUNITY

## 2024-09-30 NOTE — PERIOP NOTE
[address] [address]   MAIN OR                                  [phone]    MAIN PRE OP             [phone]                                                                                AMBULATORY PRE OP          [phone]  PRE-ADMISSION TESTING    [phone]     Surgery Date:  10/4/24       Is surgery arrival time given by surgeon?  NO    If “NO”, [facility] will call you between 4 and 7pm the day before your surgery with your arrival time. (If your surgery is on a Monday, we will call you the Friday before.)    Call [phone] after 7pm Monday-Friday if you did not receive this call.    INSTRUCTIONS BEFORE YOUR SURGERY   When You  Arrive Arrive at [facility] Patient Access on 1st floor the day of your surgery.  Have your insurance card, photo ID, living will/advanced directive/POA (if applicable),  and any copayment (if needed)   Food   and   Drink NO food or drink after midnight the night before surgery    This means NO water, gum, mints, coffee, juice, etc.  No alcohol (beer, wine, liquor) or marijuana (smoking) 24 hours prior to surgery, or Marijuana edibles for 3 days prior to surgery.  Stop smoking cigarettes 14 days before surgery (helps w/healing and breathing).   Medications to   TAKE   Morning of Surgery MEDICATIONS TO TAKE THE MORNING OF SURGERY WITH A SIP OF WATER: LEVOTHYROXINE, OMEPRAZOLE, BRING ALBUTEROL INHALER TO HOSPITAL DAY OF SURGERY ONLY IF ITS NOT  AND USE IT AS NEEDED DOS.  SKIP LISINOPRIL AND METFORMIN DAY OF SURGERY ONLY. NO CREAMS OR LOTIONS ON YOUR BODY 24 HOURS PRIOR TO SURGERY AND DEFINITELY NOT AFTER YOU START USING CHG SOAP BATHING. STOP OZEMPIC IMMEDIATELY, USUALLY STOPS 7 DAYS PRIOR TO SURGERY. STOP ASPIRIN IMMEDIATELY , USUALLY STOPS 7 DAYS PRIOR TO SURGERY. STOP MILK THISTLE, CINNAMON AND VITAMIN B12 IMMEDIATELY, USUALLY STOPS 7 DAYS PRIOR TO SURGERY.     You may take these  to sleep with you.    Shower with CHG soap 2 times before your surgery  The evening before your surgery  The morning of your surgery      Tips to help prevent infections after your surgery:  Protect your surgical wound from germs:  Hand washing is the most important thing you and your caregivers can do to prevent infections.  Keep your bandage clean and dry!  Do not touch your surgical wound.  Use clean, freshly washed towels and washcloths every time you shower; do not share bath linens with others.  Until your surgical wound is healed, wear clothing and sleep on bed linens that are clean.  Do not allow pets to sleep in your bed with you or touch your surgical wound.  Do not smoke - smoking delays wound healing. This may be a good time to stop smoking.  If you have diabetes, it is important for you to manage your blood sugar levels properly before your surgery as well as after your surgery. Poorly managed blood sugar levels slow down wound healing and prevent you from healing completely.         Follow all instructions so your surgery won’t be cancelled.  Please, be on time.                    If a situation occurs and you are delayed the day of surgery, call [phone]/ [phone].    If your physical condition changes (like a fever, cold, flu, etc.) call your surgeon as soon as possible.    Home medication(s) reviewed and verified via during PAT appointment/call.    The patient was contacted via telephone.     She verbalized understanding of all instructions does not need reinforcement.

## 2024-10-03 ENCOUNTER — ANESTHESIA EVENT (OUTPATIENT)
Facility: HOSPITAL | Age: 69
End: 2024-10-03
Payer: MEDICARE

## 2024-10-03 PROBLEM — M20.41 HAMMERTOE OF RIGHT FOOT: Status: ACTIVE | Noted: 2023-03-23

## 2024-10-03 PROBLEM — M79.671 RIGHT FOOT PAIN: Status: ACTIVE | Noted: 2024-10-03

## 2024-10-03 PROBLEM — M77.41 METATARSALGIA OF RIGHT FOOT: Status: ACTIVE | Noted: 2024-10-03

## 2024-10-03 RX ORDER — DEXAMETHASONE SODIUM PHOSPHATE 4 MG/ML
4 INJECTION, SOLUTION INTRA-ARTICULAR; INTRALESIONAL; INTRAMUSCULAR; INTRAVENOUS; SOFT TISSUE ONCE
Status: CANCELLED | OUTPATIENT
Start: 2024-10-04

## 2024-10-03 RX ORDER — METHYLPREDNISOLONE ACETATE 40 MG/ML
40 INJECTION, SUSPENSION INTRA-ARTICULAR; INTRALESIONAL; INTRAMUSCULAR; SOFT TISSUE ONCE
Status: CANCELLED | OUTPATIENT
Start: 2024-10-04

## 2024-10-03 RX ORDER — LIDOCAINE HYDROCHLORIDE 10 MG/ML
15 INJECTION, SOLUTION EPIDURAL; INFILTRATION; INTRACAUDAL; PERINEURAL ONCE
Status: CANCELLED | OUTPATIENT
Start: 2024-10-04

## 2024-10-03 RX ORDER — BUPIVACAINE HYDROCHLORIDE 5 MG/ML
30 INJECTION, SOLUTION EPIDURAL; INTRACAUDAL ONCE
Status: CANCELLED | OUTPATIENT
Start: 2024-10-04

## 2024-10-03 NOTE — ANESTHESIA PRE PROCEDURE
Department of Anesthesiology  Preprocedure Note       Name:  Kate Perez   Age:  68 y.o.  :  1955                                          MRN:  [id number]         Date:  10/3/2024      Surgeon: Surgeon(s):  Roxanne Leong DPM    Procedure: Procedure(s):  RIGHT FOOT SECOND AND THIRD, FOURTH AND FIFTH  HAMMERTOE CORRECTION, RIGHT SECOND METATARSAL OSTEOTOMY, LEFT MIDFOOT STEROID INJECTION    Medications prior to admission:   Prior to Admission medications    Medication Sig Start Date End Date Taking? Authorizing Provider   Milk Thistle 1000 MG CAPS Take 1,000 mg by mouth daily   Yes Elena Jha MD   Cinnamon 500 MG CAPS Take 2 capsules by mouth daily   Yes Elena Jha MD   magnesium gluconate (MAGONATE) 500 MG tablet Take 1 tablet by mouth daily   Yes Elena Jha MD   levothyroxine (SYNTHROID) 125 MCG tablet Take 1 tablet by mouth daily 24  Yes Brionna Obando MD   aspirin 81 MG chewable tablet Take 1 tablet by mouth Twice a Week  and    Yes Elena Jha MD   clotrimazole-betamethasone (LOTRISONE) 1-0.05 % cream APPLY TOPICALLY TO AFFECTED AREA(S) TWO TIMES DAILY  Patient taking differently: at bedtime APPLY TOPICALLY TO AFFECTED AREA(S) TWO TIMES DAILY 24  Yes Brionna Obando MD   lisinopril (PRINIVIL;ZESTRIL) 5 MG tablet Take 1 tablet by mouth daily  Patient taking differently: Take 1 tablet by mouth at bedtime Takes for diabetes 24  Yes Brionna Obando MD   fenofibrate (TRICOR) 145 MG tablet Take 1 tablet by mouth daily For high triglycerides.  Patient taking differently: Take 1 tablet by mouth at bedtime For high triglycerides. 24  Yes Brionna Obando MD   metFORMIN (GLUCOPHAGE-XR) 500 MG extended release tablet Take 2 tablets by mouth 2 times daily 24  Yes Brionna Obando MD   ezetimibe (ZETIA) 10 MG tablet Take 1 tablet by mouth daily  Patient taking differently: Take 1 tablet by mouth at bedtime 24

## 2024-10-03 NOTE — H&P
History and physical is updated and there are no changes and all information was faxed to the main OR for records.  Patient is acceptable risk for the right foot second metatarsal osteotomy with fixation and hammertoe correction of the second, third, fourth and fifth digits.  And left foot corticosteroid shot

## 2024-10-04 ENCOUNTER — ANESTHESIA (OUTPATIENT)
Facility: HOSPITAL | Age: 69
End: 2024-10-04
Payer: MEDICARE

## 2024-10-04 ENCOUNTER — HOSPITAL ENCOUNTER (OUTPATIENT)
Facility: HOSPITAL | Age: 69
Setting detail: OUTPATIENT SURGERY
Discharge: HOME | End: 2024-10-04
Attending: PODIATRIST | Admitting: PODIATRIST
Payer: MEDICARE

## 2024-10-04 VITALS
SYSTOLIC BLOOD PRESSURE: 123 MMHG | HEIGHT: 63 IN | RESPIRATION RATE: 16 BRPM | DIASTOLIC BLOOD PRESSURE: 68 MMHG | BODY MASS INDEX: 28.17 KG/M2 | HEART RATE: 65 BPM | OXYGEN SATURATION: 98 % | TEMPERATURE: 97.6 F | WEIGHT: 159 LBS

## 2024-10-04 LAB
GLUCOSE BLD STRIP.AUTO-MCNC: 104 MG/DL (ref 65–117)
GLUCOSE BLD STRIP.AUTO-MCNC: 86 MG/DL (ref 65–117)
SERVICE CMNT-IMP: NORMAL
SERVICE CMNT-IMP: NORMAL

## 2024-10-04 PROCEDURE — 2580000003 HC RX 258: Performed by: PODIATRIST

## 2024-10-04 PROCEDURE — 2580000003 HC RX 258: Performed by: NURSE PRACTITIONER

## 2024-10-04 PROCEDURE — 2500000003 HC RX 250 WO HCPCS: Performed by: NURSE PRACTITIONER

## 2024-10-04 PROCEDURE — 2720000010 HC SURG SUPPLY STERILE: Performed by: PODIATRIST

## 2024-10-04 PROCEDURE — 6360000002 HC RX W HCPCS: Performed by: PODIATRIST

## 2024-10-04 PROCEDURE — 2709999900 HC NON-CHARGEABLE SUPPLY: Performed by: PODIATRIST

## 2024-10-04 PROCEDURE — 6360000002 HC RX W HCPCS: Performed by: NURSE PRACTITIONER

## 2024-10-04 PROCEDURE — 3700000000 HC ANESTHESIA ATTENDED CARE: Performed by: PODIATRIST

## 2024-10-04 PROCEDURE — 82962 GLUCOSE BLOOD TEST: CPT

## 2024-10-04 PROCEDURE — 2500000003 HC RX 250 WO HCPCS: Performed by: PODIATRIST

## 2024-10-04 PROCEDURE — 3600000014 HC SURGERY LEVEL 4 ADDTL 15MIN: Performed by: PODIATRIST

## 2024-10-04 PROCEDURE — 7100000001 HC PACU RECOVERY - ADDTL 15 MIN: Performed by: PODIATRIST

## 2024-10-04 PROCEDURE — 7100000010 HC PHASE II RECOVERY - FIRST 15 MIN: Performed by: PODIATRIST

## 2024-10-04 PROCEDURE — 3600000004 HC SURGERY LEVEL 4 BASE: Performed by: PODIATRIST

## 2024-10-04 PROCEDURE — C1713 ANCHOR/SCREW BN/BN,TIS/BN: HCPCS | Performed by: PODIATRIST

## 2024-10-04 PROCEDURE — 7100000000 HC PACU RECOVERY - FIRST 15 MIN: Performed by: PODIATRIST

## 2024-10-04 PROCEDURE — 3700000001 HC ADD 15 MINUTES (ANESTHESIA): Performed by: PODIATRIST

## 2024-10-04 PROCEDURE — 6370000000 HC RX 637 (ALT 250 FOR IP): Performed by: ANESTHESIOLOGY

## 2024-10-04 DEVICE — MONSTER BITE SCREW, 2.0 X 14MM, SHORT THREAD
Type: IMPLANTABLE DEVICE | Site: FOOT | Status: FUNCTIONAL
Brand: MONSTER SCREW SYSTEM

## 2024-10-04 DEVICE — MONSTER BITE SCREW, 2.0 X 13MM, LONG THREAD
Type: IMPLANTABLE DEVICE | Site: FOOT | Status: FUNCTIONAL
Brand: MONSTER SCREW SYSTEM

## 2024-10-04 RX ORDER — PROCHLORPERAZINE EDISYLATE 5 MG/ML
5 INJECTION INTRAMUSCULAR; INTRAVENOUS
Status: DISCONTINUED | OUTPATIENT
Start: 2024-10-04 | End: 2024-10-04 | Stop reason: HOSPADM

## 2024-10-04 RX ORDER — ONDANSETRON 2 MG/ML
4 INJECTION INTRAMUSCULAR; INTRAVENOUS
Status: DISCONTINUED | OUTPATIENT
Start: 2024-10-04 | End: 2024-10-04 | Stop reason: HOSPADM

## 2024-10-04 RX ORDER — LIDOCAINE HYDROCHLORIDE 20 MG/ML
INJECTION, SOLUTION EPIDURAL; INFILTRATION; INTRACAUDAL; PERINEURAL
Status: DISCONTINUED | OUTPATIENT
Start: 2024-10-04 | End: 2024-10-04 | Stop reason: SDUPTHER

## 2024-10-04 RX ORDER — SODIUM CHLORIDE 0.9 % (FLUSH) 0.9 %
5-40 SYRINGE (ML) INJECTION PRN
Status: DISCONTINUED | OUTPATIENT
Start: 2024-10-04 | End: 2024-10-04 | Stop reason: HOSPADM

## 2024-10-04 RX ORDER — LIDOCAINE HYDROCHLORIDE 10 MG/ML
1 INJECTION, SOLUTION EPIDURAL; INFILTRATION; INTRACAUDAL; PERINEURAL
Status: DISCONTINUED | OUTPATIENT
Start: 2024-10-04 | End: 2024-10-04 | Stop reason: HOSPADM

## 2024-10-04 RX ORDER — BUPIVACAINE HYDROCHLORIDE 5 MG/ML
INJECTION, SOLUTION EPIDURAL; INTRACAUDAL PRN
Status: DISCONTINUED | OUTPATIENT
Start: 2024-10-04 | End: 2024-10-04 | Stop reason: HOSPADM

## 2024-10-04 RX ORDER — MIDAZOLAM HYDROCHLORIDE 2 MG/2ML
2 INJECTION, SOLUTION INTRAMUSCULAR; INTRAVENOUS PRN
Status: DISCONTINUED | OUTPATIENT
Start: 2024-10-04 | End: 2024-10-04 | Stop reason: HOSPADM

## 2024-10-04 RX ORDER — DEXAMETHASONE SODIUM PHOSPHATE 4 MG/ML
INJECTION, SOLUTION INTRA-ARTICULAR; INTRALESIONAL; INTRAMUSCULAR; INTRAVENOUS; SOFT TISSUE PRN
Status: DISCONTINUED | OUTPATIENT
Start: 2024-10-04 | End: 2024-10-04 | Stop reason: HOSPADM

## 2024-10-04 RX ORDER — PHENYLEPHRINE HCL IN 0.9% NACL 0.4MG/10ML
SYRINGE (ML) INTRAVENOUS
Status: DISCONTINUED | OUTPATIENT
Start: 2024-10-04 | End: 2024-10-04 | Stop reason: SDUPTHER

## 2024-10-04 RX ORDER — SODIUM CHLORIDE 9 MG/ML
INJECTION, SOLUTION INTRAVENOUS PRN
Status: DISCONTINUED | OUTPATIENT
Start: 2024-10-04 | End: 2024-10-04 | Stop reason: HOSPADM

## 2024-10-04 RX ORDER — METHYLPREDNISOLONE ACETATE 40 MG/ML
INJECTION, SUSPENSION INTRA-ARTICULAR; INTRALESIONAL; INTRAMUSCULAR; SOFT TISSUE PRN
Status: DISCONTINUED | OUTPATIENT
Start: 2024-10-04 | End: 2024-10-04 | Stop reason: HOSPADM

## 2024-10-04 RX ORDER — HYDRALAZINE HYDROCHLORIDE 20 MG/ML
10 INJECTION INTRAMUSCULAR; INTRAVENOUS
Status: DISCONTINUED | OUTPATIENT
Start: 2024-10-04 | End: 2024-10-04 | Stop reason: HOSPADM

## 2024-10-04 RX ORDER — FENTANYL CITRATE 50 UG/ML
25 INJECTION, SOLUTION INTRAMUSCULAR; INTRAVENOUS EVERY 5 MIN PRN
Status: DISCONTINUED | OUTPATIENT
Start: 2024-10-04 | End: 2024-10-04 | Stop reason: HOSPADM

## 2024-10-04 RX ORDER — FENTANYL CITRATE 50 UG/ML
INJECTION, SOLUTION INTRAMUSCULAR; INTRAVENOUS
Status: DISCONTINUED | OUTPATIENT
Start: 2024-10-04 | End: 2024-10-04 | Stop reason: SDUPTHER

## 2024-10-04 RX ORDER — MIDAZOLAM HYDROCHLORIDE 1 MG/ML
INJECTION INTRAMUSCULAR; INTRAVENOUS
Status: DISCONTINUED | OUTPATIENT
Start: 2024-10-04 | End: 2024-10-04 | Stop reason: SDUPTHER

## 2024-10-04 RX ORDER — SODIUM CHLORIDE 0.9 % (FLUSH) 0.9 %
5-40 SYRINGE (ML) INJECTION EVERY 12 HOURS SCHEDULED
Status: DISCONTINUED | OUTPATIENT
Start: 2024-10-04 | End: 2024-10-04 | Stop reason: HOSPADM

## 2024-10-04 RX ORDER — OXYCODONE HYDROCHLORIDE 5 MG/1
5 TABLET ORAL
Status: DISCONTINUED | OUTPATIENT
Start: 2024-10-04 | End: 2024-10-04 | Stop reason: HOSPADM

## 2024-10-04 RX ORDER — FENTANYL CITRATE 50 UG/ML
100 INJECTION, SOLUTION INTRAMUSCULAR; INTRAVENOUS
Status: DISCONTINUED | OUTPATIENT
Start: 2024-10-04 | End: 2024-10-04 | Stop reason: HOSPADM

## 2024-10-04 RX ORDER — NALOXONE HYDROCHLORIDE 0.4 MG/ML
INJECTION, SOLUTION INTRAMUSCULAR; INTRAVENOUS; SUBCUTANEOUS PRN
Status: DISCONTINUED | OUTPATIENT
Start: 2024-10-04 | End: 2024-10-04 | Stop reason: HOSPADM

## 2024-10-04 RX ORDER — SODIUM CHLORIDE, SODIUM LACTATE, POTASSIUM CHLORIDE, CALCIUM CHLORIDE 600; 310; 30; 20 MG/100ML; MG/100ML; MG/100ML; MG/100ML
INJECTION, SOLUTION INTRAVENOUS CONTINUOUS
Status: DISCONTINUED | OUTPATIENT
Start: 2024-10-04 | End: 2024-10-04 | Stop reason: HOSPADM

## 2024-10-04 RX ORDER — SODIUM CHLORIDE, SODIUM LACTATE, POTASSIUM CHLORIDE, CALCIUM CHLORIDE 600; 310; 30; 20 MG/100ML; MG/100ML; MG/100ML; MG/100ML
INJECTION, SOLUTION INTRAVENOUS
Status: DISCONTINUED | OUTPATIENT
Start: 2024-10-04 | End: 2024-10-04 | Stop reason: SDUPTHER

## 2024-10-04 RX ORDER — LIDOCAINE HYDROCHLORIDE 10 MG/ML
INJECTION, SOLUTION INFILTRATION; PERINEURAL PRN
Status: DISCONTINUED | OUTPATIENT
Start: 2024-10-04 | End: 2024-10-04 | Stop reason: HOSPADM

## 2024-10-04 RX ORDER — ACETAMINOPHEN 500 MG
1000 TABLET ORAL ONCE
Status: COMPLETED | OUTPATIENT
Start: 2024-10-04 | End: 2024-10-04

## 2024-10-04 RX ORDER — PROPOFOL 10 MG/ML
INJECTION, EMULSION INTRAVENOUS
Status: DISCONTINUED | OUTPATIENT
Start: 2024-10-04 | End: 2024-10-04 | Stop reason: SDUPTHER

## 2024-10-04 RX ADMIN — LIDOCAINE HYDROCHLORIDE 40 MG: 20 INJECTION, SOLUTION EPIDURAL; INFILTRATION; INTRACAUDAL; PERINEURAL at 07:31

## 2024-10-04 RX ADMIN — PROPOFOL 30 MG: 10 INJECTION, EMULSION INTRAVENOUS at 07:31

## 2024-10-04 RX ADMIN — Medication 80 MCG: at 08:10

## 2024-10-04 RX ADMIN — ACETAMINOPHEN 1000 MG: 500 TABLET ORAL at 07:02

## 2024-10-04 RX ADMIN — Medication 80 MCG: at 08:28

## 2024-10-04 RX ADMIN — Medication 30 MG: at 07:31

## 2024-10-04 RX ADMIN — WATER 2000 MG: 1 INJECTION INTRAMUSCULAR; INTRAVENOUS; SUBCUTANEOUS at 07:45

## 2024-10-04 RX ADMIN — MIDAZOLAM 2 MG: 1 INJECTION INTRAMUSCULAR; INTRAVENOUS at 07:31

## 2024-10-04 RX ADMIN — SODIUM CHLORIDE, POTASSIUM CHLORIDE, SODIUM LACTATE AND CALCIUM CHLORIDE: 600; 310; 30; 20 INJECTION, SOLUTION INTRAVENOUS at 07:29

## 2024-10-04 RX ADMIN — FENTANYL CITRATE 50 MCG: 50 INJECTION, SOLUTION INTRAMUSCULAR; INTRAVENOUS at 07:31

## 2024-10-04 RX ADMIN — PROPOFOL 50 MCG/KG/MIN: 10 INJECTION, EMULSION INTRAVENOUS at 07:32

## 2024-10-04 RX ADMIN — MIDAZOLAM 2 MG: 1 INJECTION INTRAMUSCULAR; INTRAVENOUS at 07:26

## 2024-10-04 ASSESSMENT — PAIN - FUNCTIONAL ASSESSMENT
PAIN_FUNCTIONAL_ASSESSMENT: NONE - DENIES PAIN
PAIN_FUNCTIONAL_ASSESSMENT: NONE - DENIES PAIN

## 2024-10-04 ASSESSMENT — PAIN SCALES - GENERAL
PAINLEVEL_OUTOF10: 0
PAINLEVEL_OUTOF10: 0

## 2024-10-04 NOTE — OP NOTE
[address]  [address]                            OPERATIVE REPORT      PATIENT NAME: RAYSA PEARSON             : 1955  MED REC NO: [id number]                       ROOM: OR  ACCOUNT NO: [id number]                       ADMIT DATE: 10/04/2024  PROVIDER: Roxanne Leong DPM    DATE OF SERVICE:  10/04/2024    PREOPERATIVE DIAGNOSES:       1. Right foot 2nd metatarsalgia, capsulitis with pain and swelling.     2. Right foot hammertoe deformity, 2nd, 3rd, 4th, and 5th digits.     3. Left foot capsulitis, metatarsalgia, and probable ganglion type cyst to the dorsal aspect of the 3rd interspace.    POSTOPERATIVE DIAGNOSES:       1. Right foot 2nd metatarsalgia, capsulitis with pain and swelling.     2. Right foot hammertoe deformity, 2nd, 3rd, 4th, and 5th digits.     3. Left foot capsulitis, metatarsalgia, and probable ganglion type cyst to the dorsal aspect of the 3rd interspace.    PROCEDURES PERFORMED:       1. Right foot 2nd metatarsal osteotomy with 2 [place] Monster break-off screws and one 0.045 K-wire fixation.     2. Right foot 2nd digit arthroplasty of the proximal interphalangeal joint with K-wire fixation.     3. Right foot 3rd digit arthroplasty of the proximal interphalangeal joint with K-wire fixation.     4. Right foot 4th digit percutaneous flexor tendon release.     5. Right foot 5th digit percutaneous flexor tendon release.     6. Left foot corticosteroid injection at the 3rd interspace and in the cyst-like structure dorsally, 1 mL of 0.5% Marcaine plain and 1 mL of Depo-Medrol plain was injected in the syringe, but only about 0.5 mL to 3/4 of a mL was injected into the area.    SURGEON:  Roxanne Leong DPM    ASSISTANT:  None.    ANESTHESIA:  IV sedation with local.    ESTIMATED BLOOD LOSS:  Less than 5 mL.    SPECIMENS REMOVED:  No pathology.    INTRAOPERATIVE FINDINGS:  hammertoe contracted to the second and at  mostly at the 3rd capsule because of having some swelling and irritation to that area.  Next, I used Adaptic, 4x4s, Kerlix, and Coban to the right foot.  The patient tolerated the procedure and anesthesia well and left the operating room with all vital signs stable and vascular status intact to the right foot.  The patient will be resting, elevating and icing.  We will start antibiotics tomorrow.  Does have all her pain medicine and will do an Advil and Tylenol cocktail.  The patient does have all written instructions as we discussed preop visit on Tuesday, and she knows what to do.  We will follow back next week and does have the next three-month appointments set up.        ODILIA DANG DPM KG/JAVIER  D:  10/04/2024 09:40:34  T:  10/04/2024 10:35:25  JOB #:  [id number]

## 2024-10-04 NOTE — FLOWSHEET NOTE
10/04/24 0800   Family Communication    Phone Number PROMISE DAMIAN [phone]   Family/Significant Other Update Called;Updated   Delivery Origin Nurse   Message Disposition Family present - message delivered   Update Given Yes   Family Communication   Family Update Message Procedure started;Surgeon working;Patient stable

## 2024-10-04 NOTE — BRIEF OP NOTE
Brief Postoperative Note      Patient: Kate Perez  YOB: 1955  MRN: [id number]    Date of Procedure: 10/4/2024    Pre-Op Diagnosis Codes:      * Hammer toe of right foot [M20.41] metatarsalgia,  left foot cyst and capsulitus third and fourth metatarsals    Post-Op Diagnosis: Same       Procedure(s):  RIGHT FOOT SECOND AND THIRD HAMMERTOE CORRECTION, FOURTH AND FIFTH PERCUTANEOUS FLEXOR TENDON RELEASE, RIGHT SECOND METATARSAL OSTEOTOMY, LEFT MIDFOOT STEROID INJECTION    Surgeon(s):  Roxanne Leong DPM    Assistant:  * No surgical staff found *    Anesthesia: Monitor Anesthesia Care    Estimated Blood Loss (mL): Minimal    Complications: None    Specimens:   * No specimens in log *    Implants:  Implant Name Type Inv. Item Serial No.  Lot No. LRB No. Used Action   PARAGON 28 MONSTER BREAK OFF SCREW      Right 1 Implanted   2.0 X 14MM SHORT THREAD      Right 1 Implanted         Drains: * No LDAs found *    Findings:  Infection Present At Time Of Surgery (PATOS) (choose all levels that have infection present):  No infection present    Other Findings: Patient tolerated the procedure and anesthesia well and left the operating room with all vital signs stable an vascular intact to the right and left foot especially the right surgery foot.  2 k-wires are in the end of the second and third digits.  All digits pink and warm    Electronically signed by Roxanne Leong DPM on 10/4/2024 at 9:23 AM

## 2024-10-04 NOTE — ANESTHESIA POSTPROCEDURE EVALUATION
Department of Anesthesiology  Postprocedure Note    Patient: Kate Perez  MRN: [id number]  YOB: 1955  Date of evaluation: 10/4/2024    Procedure Summary       Date: 10/04/24 Room / Location: [facility] MAIN OR [address] [place] [address] MAIN OR    Anesthesia Start: 0729 Anesthesia Stop: 0923    Procedure: RIGHT FOOT SECOND AND THIRD HAMMERTOE CORRECTION, FOURTH AND FIFTH PERCUTANEOUS FLEXOR TENDON RELEASE, RIGHT SECOND METATARSAL OSTEOTOMY, LEFT MIDFOOT STEROID INJECTION (Right: Foot) Diagnosis:       Hammer toe of right foot      (Hammer toe of right foot [M20.41])    Providers: Roxanne Leong DPM Responsible Provider: Kristopher Andersen MD    Anesthesia Type: MAC ASA Status: 3            Anesthesia Type: MAC    Maura Phase I: Maura Score: 10    Maura Phase II: Maura Score: 10    Anesthesia Post Evaluation    Patient location during evaluation: PACU  Patient participation: complete - patient participated  Level of consciousness: awake  Airway patency: patent  Nausea & Vomiting: no nausea  Cardiovascular status: blood pressure returned to baseline and hemodynamically stable  Respiratory status: acceptable  Hydration status: stable  Multimodal analgesia pain management approach    No notable events documented.

## 2024-10-04 NOTE — DISCHARGE INSTRUCTIONS
______________________________________________________________________    Anesthesia Discharge Instructions    After general anesthesia or intervenous sedation, for 24 hours or while taking prescription Narcotics:  Limit your activities  Do not drive or operate hazardous machinery  If you have not urinated within 8 hours after discharge, please contact your surgeon on call.  Do not make important personal or business decisions  Do not drink alcoholic beverages    Report the following to your surgeon:  Excessive pain, swelling, redness or odor of or around the surgical area  Temperature over 100.5 degrees  Nausea and vomiting lasting longer than 4 hours or if unable to take medication  Any signs of decreased circulation or nerve impairment to extremity:  Change in color, persistent numbness, tingling, coldness or increased pain.  Any questions

## 2024-10-11 DIAGNOSIS — E11.9 TYPE 2 DIABETES MELLITUS WITHOUT COMPLICATION, WITHOUT LONG-TERM CURRENT USE OF INSULIN (HCC): ICD-10-CM

## 2024-10-11 RX ORDER — METFORMIN HCL 500 MG
TABLET, EXTENDED RELEASE 24 HR ORAL
Qty: 120 TABLET | Refills: 5 | Status: SHIPPED | OUTPATIENT
Start: 2024-10-11

## 2024-12-09 ENCOUNTER — TELEPHONE (OUTPATIENT)
Age: 69
End: 2024-12-09

## 2024-12-09 DIAGNOSIS — E03.9 HYPOTHYROIDISM, UNSPECIFIED TYPE: Primary | ICD-10-CM

## 2024-12-09 DIAGNOSIS — E83.52 HYPERCALCEMIA: ICD-10-CM

## 2024-12-18 ENCOUNTER — TELEPHONE (OUTPATIENT)
Age: 69
End: 2024-12-18

## 2024-12-18 DIAGNOSIS — E03.9 HYPOTHYROIDISM, UNSPECIFIED TYPE: Primary | ICD-10-CM

## 2024-12-18 LAB
CALCIUM SERPL-MCNC: 9.8 MG/DL (ref 8.7–10.3)
T4 FREE SERPL-MCNC: 2.19 NG/DL (ref 0.82–1.77)
TSH SERPL DL<=0.005 MIU/L-ACNC: <0.005 UIU/ML (ref 0.45–4.5)

## 2024-12-18 RX ORDER — LEVOTHYROXINE SODIUM 75 UG/1
75 TABLET ORAL DAILY
Qty: 90 TABLET | Refills: 1 | Status: SHIPPED | OUTPATIENT
Start: 2024-12-18

## 2024-12-18 NOTE — TELEPHONE ENCOUNTER
I spoke to pt about abnormal thyroid level.  Need to cut back dose of Levothyroxine. I will send new RX Levothyroxine 75 mcg daily. Repeat thyroid labs in 2 months.

## 2024-12-19 ENCOUNTER — TELEPHONE (OUTPATIENT)
Age: 69
End: 2024-12-19

## 2024-12-19 NOTE — TELEPHONE ENCOUNTER
----- Message from Dr. Brionna Obando MD sent at 12/18/2024  5:59 PM EST -----  I spoke with pt about abnormal thyroid tests.  Need to decrease dose of Levothyroxine. Plan recheck again in 2 months. Normal calcium.

## 2025-01-02 ENCOUNTER — TELEPHONE (OUTPATIENT)
Age: 70
End: 2025-01-02

## 2025-01-23 DIAGNOSIS — E11.9 TYPE 2 DIABETES MELLITUS WITHOUT COMPLICATION, WITHOUT LONG-TERM CURRENT USE OF INSULIN: ICD-10-CM

## 2025-01-23 RX ORDER — SEMAGLUTIDE 1.34 MG/ML
INJECTION, SOLUTION SUBCUTANEOUS
Qty: 3 ML | Refills: 2 | Status: SHIPPED | OUTPATIENT
Start: 2025-01-23

## 2025-03-02 RX ORDER — EZETIMIBE 10 MG/1
10 TABLET ORAL DAILY
Qty: 90 TABLET | Refills: 0 | Status: SHIPPED | OUTPATIENT
Start: 2025-03-02

## 2025-03-04 SDOH — ECONOMIC STABILITY: INCOME INSECURITY: IN THE LAST 12 MONTHS, WAS THERE A TIME WHEN YOU WERE NOT ABLE TO PAY THE MORTGAGE OR RENT ON TIME?: NO

## 2025-03-04 SDOH — ECONOMIC STABILITY: FOOD INSECURITY: WITHIN THE PAST 12 MONTHS, YOU WORRIED THAT YOUR FOOD WOULD RUN OUT BEFORE YOU GOT MONEY TO BUY MORE.: NEVER TRUE

## 2025-03-04 SDOH — ECONOMIC STABILITY: FOOD INSECURITY: WITHIN THE PAST 12 MONTHS, THE FOOD YOU BOUGHT JUST DIDN'T LAST AND YOU DIDN'T HAVE MONEY TO GET MORE.: NEVER TRUE

## 2025-03-04 SDOH — ECONOMIC STABILITY: TRANSPORTATION INSECURITY
IN THE PAST 12 MONTHS, HAS THE LACK OF TRANSPORTATION KEPT YOU FROM MEDICAL APPOINTMENTS OR FROM GETTING MEDICATIONS?: NO

## 2025-03-04 ASSESSMENT — PATIENT HEALTH QUESTIONNAIRE - PHQ9
1. LITTLE INTEREST OR PLEASURE IN DOING THINGS: NOT AT ALL
SUM OF ALL RESPONSES TO PHQ QUESTIONS 1-9: 0
SUM OF ALL RESPONSES TO PHQ QUESTIONS 1-9: 0
9. THOUGHTS THAT YOU WOULD BE BETTER OFF DEAD, OR OF HURTING YOURSELF: NOT AT ALL
10. IF YOU CHECKED OFF ANY PROBLEMS, HOW DIFFICULT HAVE THESE PROBLEMS MADE IT FOR YOU TO DO YOUR WORK, TAKE CARE OF THINGS AT HOME, OR GET ALONG WITH OTHER PEOPLE: NOT DIFFICULT AT ALL
6. FEELING BAD ABOUT YOURSELF - OR THAT YOU ARE A FAILURE OR HAVE LET YOURSELF OR YOUR FAMILY DOWN: NOT AT ALL
3. TROUBLE FALLING OR STAYING ASLEEP: NOT AT ALL
2. FEELING DOWN, DEPRESSED OR HOPELESS: NOT AT ALL
4. FEELING TIRED OR HAVING LITTLE ENERGY: NOT AT ALL
8. MOVING OR SPEAKING SO SLOWLY THAT OTHER PEOPLE COULD HAVE NOTICED. OR THE OPPOSITE, BEING SO FIGETY OR RESTLESS THAT YOU HAVE BEEN MOVING AROUND A LOT MORE THAN USUAL: NOT AT ALL
10. IF YOU CHECKED OFF ANY PROBLEMS, HOW DIFFICULT HAVE THESE PROBLEMS MADE IT FOR YOU TO DO YOUR WORK, TAKE CARE OF THINGS AT HOME, OR GET ALONG WITH OTHER PEOPLE: NOT DIFFICULT AT ALL
5. POOR APPETITE OR OVEREATING: NOT AT ALL
2. FEELING DOWN, DEPRESSED OR HOPELESS: NOT AT ALL
4. FEELING TIRED OR HAVING LITTLE ENERGY: NOT AT ALL
SUM OF ALL RESPONSES TO PHQ QUESTIONS 1-9: 0
7. TROUBLE CONCENTRATING ON THINGS, SUCH AS READING THE NEWSPAPER OR WATCHING TELEVISION: NOT AT ALL
1. LITTLE INTEREST OR PLEASURE IN DOING THINGS: NOT AT ALL
SUM OF ALL RESPONSES TO PHQ QUESTIONS 1-9: 0
SUM OF ALL RESPONSES TO PHQ QUESTIONS 1-9: 0
8. MOVING OR SPEAKING SO SLOWLY THAT OTHER PEOPLE COULD HAVE NOTICED. OR THE OPPOSITE - BEING SO FIDGETY OR RESTLESS THAT YOU HAVE BEEN MOVING AROUND A LOT MORE THAN USUAL: NOT AT ALL
5. POOR APPETITE OR OVEREATING: NOT AT ALL
6. FEELING BAD ABOUT YOURSELF - OR THAT YOU ARE A FAILURE OR HAVE LET YOURSELF OR YOUR FAMILY DOWN: NOT AT ALL
9. THOUGHTS THAT YOU WOULD BE BETTER OFF DEAD, OR OF HURTING YOURSELF: NOT AT ALL
7. TROUBLE CONCENTRATING ON THINGS, SUCH AS READING THE NEWSPAPER OR WATCHING TELEVISION: NOT AT ALL
3. TROUBLE FALLING OR STAYING ASLEEP: NOT AT ALL

## 2025-03-05 ENCOUNTER — OFFICE VISIT (OUTPATIENT)
Age: 70
End: 2025-03-05
Payer: MEDICARE

## 2025-03-05 ENCOUNTER — LAB (OUTPATIENT)
Age: 70
End: 2025-03-05
Payer: MEDICARE

## 2025-03-05 VITALS
RESPIRATION RATE: 20 BRPM | SYSTOLIC BLOOD PRESSURE: 110 MMHG | HEIGHT: 63 IN | DIASTOLIC BLOOD PRESSURE: 64 MMHG | HEART RATE: 67 BPM | OXYGEN SATURATION: 99 % | BODY MASS INDEX: 30.12 KG/M2 | WEIGHT: 170 LBS | TEMPERATURE: 97 F

## 2025-03-05 DIAGNOSIS — E11.40 TYPE 2 DIABETES MELLITUS WITH DIABETIC NEUROPATHY, WITHOUT LONG-TERM CURRENT USE OF INSULIN (HCC): ICD-10-CM

## 2025-03-05 DIAGNOSIS — E78.00 HYPERCHOLESTEROLEMIA: ICD-10-CM

## 2025-03-05 DIAGNOSIS — E03.9 HYPOTHYROIDISM, UNSPECIFIED TYPE: Primary | ICD-10-CM

## 2025-03-05 DIAGNOSIS — I10 ESSENTIAL (PRIMARY) HYPERTENSION: ICD-10-CM

## 2025-03-05 DIAGNOSIS — Z79.899 OTHER LONG TERM (CURRENT) DRUG THERAPY: ICD-10-CM

## 2025-03-05 DIAGNOSIS — F32.1 CURRENT MODERATE EPISODE OF MAJOR DEPRESSIVE DISORDER WITHOUT PRIOR EPISODE (HCC): ICD-10-CM

## 2025-03-05 DIAGNOSIS — E03.9 HYPOTHYROIDISM, UNSPECIFIED TYPE: ICD-10-CM

## 2025-03-05 DIAGNOSIS — M19.90 ARTHRITIS: ICD-10-CM

## 2025-03-05 DIAGNOSIS — E83.52 HYPERCALCEMIA: ICD-10-CM

## 2025-03-05 PROCEDURE — G2211 COMPLEX E/M VISIT ADD ON: HCPCS | Performed by: FAMILY MEDICINE

## 2025-03-05 PROCEDURE — G8427 DOCREV CUR MEDS BY ELIG CLIN: HCPCS | Performed by: FAMILY MEDICINE

## 2025-03-05 PROCEDURE — G8399 PT W/DXA RESULTS DOCUMENT: HCPCS | Performed by: FAMILY MEDICINE

## 2025-03-05 PROCEDURE — 3074F SYST BP LT 130 MM HG: CPT | Performed by: FAMILY MEDICINE

## 2025-03-05 PROCEDURE — 1160F RVW MEDS BY RX/DR IN RCRD: CPT | Performed by: FAMILY MEDICINE

## 2025-03-05 PROCEDURE — 99214 OFFICE O/P EST MOD 30 MIN: CPT | Performed by: FAMILY MEDICINE

## 2025-03-05 PROCEDURE — 1036F TOBACCO NON-USER: CPT | Performed by: FAMILY MEDICINE

## 2025-03-05 PROCEDURE — G8417 CALC BMI ABV UP PARAM F/U: HCPCS | Performed by: FAMILY MEDICINE

## 2025-03-05 PROCEDURE — 1123F ACP DISCUSS/DSCN MKR DOCD: CPT | Performed by: FAMILY MEDICINE

## 2025-03-05 PROCEDURE — 1159F MED LIST DOCD IN RCRD: CPT | Performed by: FAMILY MEDICINE

## 2025-03-05 PROCEDURE — 1126F AMNT PAIN NOTED NONE PRSNT: CPT | Performed by: FAMILY MEDICINE

## 2025-03-05 PROCEDURE — 3078F DIAST BP <80 MM HG: CPT | Performed by: FAMILY MEDICINE

## 2025-03-05 PROCEDURE — 3017F COLORECTAL CA SCREEN DOC REV: CPT | Performed by: FAMILY MEDICINE

## 2025-03-05 PROCEDURE — 2022F DILAT RTA XM EVC RTNOPTHY: CPT | Performed by: FAMILY MEDICINE

## 2025-03-05 PROCEDURE — 3046F HEMOGLOBIN A1C LEVEL >9.0%: CPT | Performed by: FAMILY MEDICINE

## 2025-03-05 PROCEDURE — 1090F PRES/ABSN URINE INCON ASSESS: CPT | Performed by: FAMILY MEDICINE

## 2025-03-05 RX ORDER — VENLAFAXINE HYDROCHLORIDE 75 MG/1
75 CAPSULE, EXTENDED RELEASE ORAL DAILY
Qty: 90 CAPSULE | Refills: 1 | Status: SHIPPED | OUTPATIENT
Start: 2025-03-05

## 2025-03-05 RX ORDER — EZETIMIBE 10 MG/1
10 TABLET ORAL DAILY
Qty: 90 TABLET | Refills: 3 | Status: SHIPPED | OUTPATIENT
Start: 2025-03-05

## 2025-03-05 NOTE — PROGRESS NOTES
Kate Perez (:  1955) is a 69 y.o. female,Established patient, here for evaluation of the following chief complaint(s):  Diabetes (Patient is here for follow up with no concerns), Hyperlipidemia, Hypertension, and Lab Collection (Fasting)        ASSESSMENT/PLAN:  1. Hypothyroidism, unspecified type  -     T4, Free; Future  -     TSH; Future  2. Type 2 diabetes mellitus with diabetic neuropathy, without long-term current use of insulin ([facility])  -      DIABETES FOOT EXAM  -     Albumin/Creatinine Ratio, Urine; Future  -     Hemoglobin A1C; Future  3. Hypercholesterolemia  -     ezetimibe (ZETIA) 10 MG tablet; Take 1 tablet by mouth daily, Disp-90 tablet, R-3Normal  -     Lipid Panel; Future  4. Essential (primary) hypertension  5. Other long term (current) drug therapy  -     CBC with Auto Differential; Future  -     Comprehensive Metabolic Panel; Future  6. Current moderate episode of major depressive disorder without prior episode ([facility])  -     venlafaxine (EFFEXOR XR) 75 MG extended release capsule; Take 1 capsule by mouth daily, Disp-90 capsule, R-1Normal  7. Arthritis  -     [facility] - Tonya Baumann MD, Orthopedic Surgery (elbow, hand, wrist), [place]      No follow-ups on file.      SUBJECTIVE/OBJECTIVE:  HPI  FU DM, hypothyroidism, HTN, hypercholesterolemia, depression.  Bad hand arthritis R middle finger PIP. Tender swelling.    Current Outpatient Medications   Medication Sig Dispense Refill    ezetimibe (ZETIA) 10 MG tablet Take 1 tablet by mouth daily 90 tablet 3    venlafaxine (EFFEXOR XR) 75 MG extended release capsule Take 1 capsule by mouth daily 90 capsule 1    Semaglutide, 1 MG/DOSE, (OZEMPIC, 1 MG/DOSE,) 4 MG/3ML SOPN sc injection INJECT 1MG SUBCUTANEOUSLY (UNDER THE SKIN) ONCE WEEKLY 3 mL 2    levothyroxine (SYNTHROID) 75 MCG tablet Take 1 tablet by mouth daily 90 tablet 1    metFORMIN (GLUCOPHAGE-XR) 500 MG extended release tablet TAKE 2 TABLETS BY MOUTH TWO TIMES DAILY 120 tablet 5

## 2025-03-05 NOTE — PROGRESS NOTES
Identified pt with two pt identifiers(name and )    Chief Complaint   Patient presents with    Diabetes     Patient is here for follow up with no concerns    Hyperlipidemia    Hypertension    Lab Collection     Fasting        Health Maintenance Due   Topic    COVID-19 Vaccine (7 - 2024-25 season)    Diabetic foot exam     Diabetic Alb to Cr ratio (uACR) test     Breast cancer screen        Wt Readings from Last 3 Encounters:   25 77.1 kg (170 lb)   10/04/24 72.1 kg (159 lb)   24 72.1 kg (159 lb)     Temp Readings from Last 3 Encounters:   25 97 °F (36.1 °C) (Temporal)   10/04/24 97.6 °F (36.4 °C) (Oral)   24 97.7 °F (36.5 °C) (Temporal)     BP Readings from Last 3 Encounters:   25 110/64   10/04/24 123/68   24 110/62     Pulse Readings from Last 3 Encounters:   25 67   10/04/24 65   24 68           Depression Screening:  :         3/4/2025     8:06 AM 2024    10:04 AM 6/15/2024    10:48 AM 3/20/2023    11:00 AM 3/6/2023     2:00 PM 8/10/2022     3:00 PM 2/3/2022     8:00 AM   PHQ-9 Questionaire   Little interest or pleasure in doing things 0 0 0 0 0 0 1   Feeling down, depressed, or hopeless 0 0 0 0 0 0 1   Trouble falling or staying asleep, or sleeping too much 0 0 0       Feeling tired or having little energy 0 0 0       Poor appetite or overeating 0 0 0       Feeling bad about yourself - or that you are a failure or have let yourself or your family down 0 0 0       Trouble concentrating on things, such as reading the newspaper or watching television 0 0 0       Moving or speaking so slowly that other people could have noticed. Or the opposite - being so fidgety or restless that you have been moving around a lot more than usual 0 0 0       Thoughts that you would be better off dead, or of hurting yourself in some way 0 0 0       PHQ-9 Total Score 0 0 0 0 0 0 2   If you checked off any problems, how difficult have these problems made it for you to do your

## 2025-03-06 LAB
COMMENT:: NORMAL
SPECIMEN HOLD: NORMAL
SPECIMEN HOLD: NORMAL
T4 FREE SERPL-MCNC: 1.3 NG/DL (ref 0.8–1.5)
TSH SERPL DL<=0.05 MIU/L-ACNC: 0.26 UIU/ML (ref 0.36–3.74)

## 2025-03-07 ENCOUNTER — TELEPHONE (OUTPATIENT)
Age: 70
End: 2025-03-07

## 2025-03-07 LAB
ALBUMIN SERPL-MCNC: 3.6 G/DL (ref 3.5–5)
ALBUMIN/GLOB SERPL: 1.1 (ref 1.1–2.2)
ALP SERPL-CCNC: 60 U/L (ref 45–117)
ALT SERPL-CCNC: 25 U/L (ref 12–78)
ANION GAP SERPL CALC-SCNC: 4 MMOL/L (ref 2–12)
AST SERPL-CCNC: 26 U/L (ref 15–37)
BASOPHILS # BLD: 0.13 K/UL (ref 0–0.1)
BASOPHILS NFR BLD: 2.3 % (ref 0–1)
BILIRUB SERPL-MCNC: 0.3 MG/DL (ref 0.2–1)
BUN SERPL-MCNC: 13 MG/DL (ref 6–20)
BUN/CREAT SERPL: 17 (ref 12–20)
CALCIUM SERPL-MCNC: 9.6 MG/DL (ref 8.5–10.1)
CALCIUM SERPL-MCNC: 9.8 MG/DL (ref 8.5–10.1)
CHLORIDE SERPL-SCNC: 106 MMOL/L (ref 97–108)
CHOLEST SERPL-MCNC: 193 MG/DL
CO2 SERPL-SCNC: 27 MMOL/L (ref 21–32)
CREAT SERPL-MCNC: 0.76 MG/DL (ref 0.55–1.02)
CREAT UR-MCNC: 105 MG/DL
DIFFERENTIAL METHOD BLD: ABNORMAL
EOSINOPHIL # BLD: 0.12 K/UL (ref 0–0.4)
EOSINOPHIL NFR BLD: 2.2 % (ref 0–7)
ERYTHROCYTE [DISTWIDTH] IN BLOOD BY AUTOMATED COUNT: 15 % (ref 11.5–14.5)
EST. AVERAGE GLUCOSE BLD GHB EST-MCNC: 126 MG/DL
GLOBULIN SER CALC-MCNC: 3.3 G/DL (ref 2–4)
GLUCOSE SERPL-MCNC: 87 MG/DL (ref 65–100)
HBA1C MFR BLD: 6 % (ref 4–5.6)
HCT VFR BLD AUTO: 36 % (ref 35–47)
HDLC SERPL-MCNC: 62 MG/DL
HDLC SERPL: 3.1 (ref 0–5)
HGB BLD-MCNC: 11.2 G/DL (ref 11.5–16)
IMM GRANULOCYTES # BLD AUTO: 0.05 K/UL (ref 0–0.04)
IMM GRANULOCYTES NFR BLD AUTO: 0.9 % (ref 0–0.5)
LDLC SERPL CALC-MCNC: 100 MG/DL (ref 0–100)
LYMPHOCYTES # BLD: 2.11 K/UL (ref 0.8–3.5)
LYMPHOCYTES NFR BLD: 38 % (ref 12–49)
MCH RBC QN AUTO: 25.4 PG (ref 26–34)
MCHC RBC AUTO-ENTMCNC: 31.1 G/DL (ref 30–36.5)
MCV RBC AUTO: 81.6 FL (ref 80–99)
MICROALBUMIN UR-MCNC: 0.63 MG/DL
MICROALBUMIN/CREAT UR-RTO: 6 MG/G (ref 0–30)
MONOCYTES # BLD: 0.55 K/UL (ref 0–1)
MONOCYTES NFR BLD: 9.9 % (ref 5–13)
NEUTS SEG # BLD: 2.59 K/UL (ref 1.8–8)
NEUTS SEG NFR BLD: 46.7 % (ref 32–75)
NRBC # BLD: 0 K/UL (ref 0–0.01)
NRBC BLD-RTO: 0 PER 100 WBC
PLATELET # BLD AUTO: 264 K/UL (ref 150–400)
PMV BLD AUTO: 10.7 FL (ref 8.9–12.9)
POTASSIUM SERPL-SCNC: 5.3 MMOL/L (ref 3.5–5.1)
PROT SERPL-MCNC: 6.9 G/DL (ref 6.4–8.2)
RBC # BLD AUTO: 4.41 M/UL (ref 3.8–5.2)
SODIUM SERPL-SCNC: 137 MMOL/L (ref 136–145)
T4 FREE SERPL-MCNC: 1.3 NG/DL (ref 0.8–1.5)
TRIGL SERPL-MCNC: 155 MG/DL
TSH SERPL DL<=0.05 MIU/L-ACNC: 0.27 UIU/ML (ref 0.36–3.74)
VLDLC SERPL CALC-MCNC: 31 MG/DL
WBC # BLD AUTO: 5.6 K/UL (ref 3.6–11)

## 2025-03-07 NOTE — TELEPHONE ENCOUNTER
----- Message from Dr. Brionna Obando MD sent at 3/6/2025  9:30 PM EST -----  thyroid level is improved.  Cont on current dose of Levothyroxine 75 mcg daily.  Plan to repeat thyroid labs only in 3 months.

## 2025-03-07 NOTE — TELEPHONE ENCOUNTER
Sent a [employer] message with results-patient is active in [employer].      Patient has seen results on [employer].

## 2025-03-09 ENCOUNTER — RESULTS FOLLOW-UP (OUTPATIENT)
Age: 70
End: 2025-03-09

## 2025-04-03 DIAGNOSIS — E11.9 TYPE 2 DIABETES MELLITUS WITHOUT COMPLICATION, WITHOUT LONG-TERM CURRENT USE OF INSULIN: ICD-10-CM

## 2025-04-03 RX ORDER — SEMAGLUTIDE 1.34 MG/ML
INJECTION, SOLUTION SUBCUTANEOUS
Qty: 3 ML | Refills: 5 | Status: SHIPPED | OUTPATIENT
Start: 2025-04-03

## 2025-04-18 DIAGNOSIS — E11.9 TYPE 2 DIABETES MELLITUS WITHOUT COMPLICATION, WITHOUT LONG-TERM CURRENT USE OF INSULIN: ICD-10-CM

## 2025-04-18 RX ORDER — METFORMIN HYDROCHLORIDE 500 MG/1
TABLET, EXTENDED RELEASE ORAL
Qty: 120 TABLET | Refills: 5 | Status: SHIPPED | OUTPATIENT
Start: 2025-04-18

## 2025-05-10 DIAGNOSIS — E78.00 HYPERCHOLESTEROLEMIA: ICD-10-CM

## 2025-05-11 RX ORDER — LISINOPRIL 5 MG/1
5 TABLET ORAL DAILY
Qty: 90 TABLET | Refills: 3 | Status: SHIPPED | OUTPATIENT
Start: 2025-05-11

## 2025-05-11 RX ORDER — FENOFIBRATE 145 MG/1
TABLET, FILM COATED ORAL
Qty: 90 TABLET | Refills: 3 | Status: SHIPPED | OUTPATIENT
Start: 2025-05-11

## 2025-05-29 DIAGNOSIS — E03.9 HYPOTHYROIDISM, UNSPECIFIED TYPE: ICD-10-CM

## 2025-05-29 RX ORDER — LEVOTHYROXINE SODIUM 75 UG/1
75 TABLET ORAL DAILY
Qty: 90 TABLET | Refills: 1 | Status: SHIPPED | OUTPATIENT
Start: 2025-05-29

## 2025-06-13 ENCOUNTER — PATIENT MESSAGE (OUTPATIENT)
Age: 70
End: 2025-06-13

## 2025-06-13 ENCOUNTER — TELEPHONE (OUTPATIENT)
Age: 70
End: 2025-06-13

## 2025-06-13 DIAGNOSIS — E03.9 HYPOTHYROIDISM, UNSPECIFIED TYPE: Primary | ICD-10-CM

## 2025-06-13 DIAGNOSIS — D64.9 ANEMIA, UNSPECIFIED TYPE: ICD-10-CM

## 2025-06-19 ENCOUNTER — RESULTS FOLLOW-UP (OUTPATIENT)
Age: 70
End: 2025-06-19

## 2025-06-19 LAB
BASOPHILS # BLD AUTO: 0.1 X10E3/UL (ref 0–0.2)
BASOPHILS NFR BLD AUTO: 1 %
EOSINOPHIL # BLD AUTO: 0.1 X10E3/UL (ref 0–0.4)
EOSINOPHIL NFR BLD AUTO: 2 %
ERYTHROCYTE [DISTWIDTH] IN BLOOD BY AUTOMATED COUNT: 14.8 % (ref 11.7–15.4)
HCT VFR BLD AUTO: 37.6 % (ref 34–46.6)
HGB BLD-MCNC: 11.5 G/DL (ref 11.1–15.9)
IMM GRANULOCYTES # BLD AUTO: 0 X10E3/UL (ref 0–0.1)
IMM GRANULOCYTES NFR BLD AUTO: 0 %
LYMPHOCYTES # BLD AUTO: 2.3 X10E3/UL (ref 0.7–3.1)
LYMPHOCYTES NFR BLD AUTO: 37 %
MCH RBC QN AUTO: 25.4 PG (ref 26.6–33)
MCHC RBC AUTO-ENTMCNC: 30.6 G/DL (ref 31.5–35.7)
MCV RBC AUTO: 83 FL (ref 79–97)
MONOCYTES # BLD AUTO: 0.5 X10E3/UL (ref 0.1–0.9)
MONOCYTES NFR BLD AUTO: 8 %
NEUTROPHILS # BLD AUTO: 3.3 X10E3/UL (ref 1.4–7)
NEUTROPHILS NFR BLD AUTO: 52 %
PLATELET # BLD AUTO: 281 X10E3/UL (ref 150–450)
RBC # BLD AUTO: 4.53 X10E6/UL (ref 3.77–5.28)
T4 FREE SERPL-MCNC: 1.21 NG/DL (ref 0.82–1.77)
TSH SERPL DL<=0.005 MIU/L-ACNC: 1.52 UIU/ML (ref 0.45–4.5)
WBC # BLD AUTO: 6.3 X10E3/UL (ref 3.4–10.8)

## 2025-07-22 ENCOUNTER — TELEPHONE (OUTPATIENT)
Age: 70
End: 2025-07-22

## 2025-07-22 DIAGNOSIS — Z85.3 HX: BREAST CANCER: Primary | ICD-10-CM

## 2025-09-07 DIAGNOSIS — F32.1 CURRENT MODERATE EPISODE OF MAJOR DEPRESSIVE DISORDER WITHOUT PRIOR EPISODE (HCC): ICD-10-CM

## 2025-09-07 RX ORDER — VENLAFAXINE HYDROCHLORIDE 75 MG/1
CAPSULE, EXTENDED RELEASE ORAL DAILY
Qty: 90 CAPSULE | Refills: 1 | Status: SHIPPED | OUTPATIENT
Start: 2025-09-07

## (undated) DEVICE — SPONGE GZ W4XL4IN COT 12 PLY TYP VII WVN C FLD DSGN

## (undated) DEVICE — SOLUTION IRRIG 1000ML 0.9% SOD CHL USP POUR PLAS BTL

## (undated) DEVICE — DRESSING,GAUZE,XEROFORM,CURAD,1"X8",ST: Brand: CURAD

## (undated) DEVICE — BANDAGE COBAN 4 IN COMPR W4INXL5YD FOAM COHESIVE QUIK STK SELF ADH SFT

## (undated) DEVICE — DISPOSABLE TOURNIQUET CUFF SINGLE BLADDER, DUAL PORT AND QUICK CONNECT CONNECTOR: Brand: COLOR CUFF

## (undated) DEVICE — SYRINGE MED 10ML LUERLOCK TIP W/O SFTY DISP

## (undated) DEVICE — GLOVE ORANGE PI 7   MSG9070

## (undated) DEVICE — SUTURE PROL SZ 4-0 L18IN NONABSORBABLE BLU L19MM PS-2 3/8 8682G

## (undated) DEVICE — SYR 3ML LL TIP 1/10ML GRAD --

## (undated) DEVICE — SYR 10ML LUER LOK 1/5ML GRAD --

## (undated) DEVICE — SYRINGE MEDICAL 3ML CLEAR PLASTIC STANDARD NON CONTROL LUERLOCK TIP DISPOSABLE

## (undated) DEVICE — K-WIRE, SINGLE ENDED TROCAR TIP, SMOOTH, 1.1 X 100MM: Brand: MONSTER SCREW SYSTEM

## (undated) DEVICE — BANDAGE,GAUZE,CONFORMING,3"X75",STRL,LF: Brand: MEDLINE

## (undated) DEVICE — SUTURE VCRL SZ 3-0 L18IN ABSRB UD PS-2 L19MM 3/8 CRV PRIM J497H

## (undated) DEVICE — Device: Brand: JELCO

## (undated) DEVICE — PADDING CST CRMPD 3INX4YD NS --

## (undated) DEVICE — EXTREMITY - SMH: Brand: MEDLINE INDUSTRIES, INC.

## (undated) DEVICE — MASTISOL ADHESIVE LIQ 2/3ML

## (undated) DEVICE — COVER LT HNDL PLAS RIG 1 PER PK

## (undated) DEVICE — HYPODERMIC SAFETY NEEDLE: Brand: MONOJECT

## (undated) DEVICE — CAP PROTCT PIN 0.045INX0.89MM --

## (undated) DEVICE — PRECISION OFFSET (9.0 X 0.51 X 31.0MM)

## (undated) DEVICE — INTENDED FOR TISSUE SEPARATION, AND OTHER PROCEDURES THAT REQUIRE A SHARP SURGICAL BLADE TO PUNCTURE OR CUT.: Brand: BARD-PARKER ® CARBON RIB-BACK BLADES

## (undated) DEVICE — SPONGE GZ W4XL4IN COT 12 PLY TYP VII WVN C FLD DSGN STERILE

## (undated) DEVICE — SUTURE VCRL SZ 4-0 L27IN ABSRB UD L19MM PS-2 3/8 CIR PRIM J426H

## (undated) DEVICE — BANDAGE,ELASTIC,ESMARK,STERILE,4"X9',LF: Brand: MEDLINE

## (undated) DEVICE — SUT PROL 4-0 18IN P3 BLU --

## (undated) DEVICE — PRECISION THIN (9.0 X 0.38 X 25.0MM)

## (undated) DEVICE — SUTURE VICRYL + SZ 4-0 L27IN ABSRB UD PS-2 3/8 CIR REV CUT VCP426H

## (undated) DEVICE — BANDAGE,GAUZE,BULKEE II,4.5"X4.1YD,STRL: Brand: MEDLINE

## (undated) DEVICE — NEEDLE HYPO 25GA L1.5IN BVL ORIENTED ECLIPSE

## (undated) DEVICE — PADDING CAST W3INXL4YD POLY POR SPUN DACRON SYN VERSATILE

## (undated) DEVICE — BNDG SELF ADH 4INX5YD STRL LF -- COFLEX LF2

## (undated) DEVICE — SUTURE VCRL SZ 4-0 L27IN ABSRB UD L26MM SH 1/2 CIR J415H

## (undated) DEVICE — SUTURE VICRYL SZ 3-0 L18IN ABSRB UD PS-2 L19MM 3/8 CRV PRIM J497H

## (undated) DEVICE — SOLUTION SURG PREP 26 CC PURPREP

## (undated) DEVICE — DRSG GZ OIL EMUL CURAD 3X3 --

## (undated) DEVICE — C-WIRE PAK DOUBLE ENDED ORTHOPAEDIC WIRE, TROCAR, .045" (1.14 MM): Brand: C-WIRE

## (undated) DEVICE — GUARD PIN DIA0.045IN WHT REM SL FOR K WIRE STNMN MINIMUM ORDER 2 BX

## (undated) DEVICE — X-RAY DETECTABLE SPONGES,16 PLY: Brand: VISTEC

## (undated) DEVICE — SUTURE ETHILON SZ 3-0 L18IN NONABSORBABLE BLK L24MM PS-1 3/8 1663G

## (undated) DEVICE — SUTURE ETHBND EXCEL SZ 2-0 L36IN NONABSORBABLE GRN SH-1 X763H

## (undated) DEVICE — WIRE FIX L5IN DIA0045IN DBL END TRCR

## (undated) DEVICE — DRESSING PETRO W3XL3IN OIL EMUL N ADH GZ KNIT IMPREG CELOS

## (undated) DEVICE — SUTURE PROL SZ 4-0 L18IN NONABSORBABLE BLU L13MM P-3 3/8 8699G

## (undated) DEVICE — SUT ETHLN 4-0 18IN PS2 BLK --

## (undated) DEVICE — PADDING UNDERCAST W3INXL12FT RAYON POLY SYN NONADHESIVE

## (undated) DEVICE — BLADE,CARBON-STEEL,15,STRL,DISPOSABLE,TB: Brand: MEDLINE